# Patient Record
Sex: FEMALE | Race: WHITE | NOT HISPANIC OR LATINO | Employment: FULL TIME | URBAN - METROPOLITAN AREA
[De-identification: names, ages, dates, MRNs, and addresses within clinical notes are randomized per-mention and may not be internally consistent; named-entity substitution may affect disease eponyms.]

---

## 2017-03-23 ENCOUNTER — HOSPITAL ENCOUNTER (OUTPATIENT)
Dept: PHYSICAL THERAPY | Facility: REHABILITATION | Age: 52
End: 2017-03-23
Attending: FAMILY MEDICINE
Payer: COMMERCIAL

## 2017-03-23 PROCEDURE — 97014 ELECTRIC STIMULATION THERAPY: CPT

## 2017-03-23 PROCEDURE — 97161 PT EVAL LOW COMPLEX 20 MIN: CPT

## 2017-03-23 PROCEDURE — 97110 THERAPEUTIC EXERCISES: CPT

## 2017-03-27 ENCOUNTER — HOSPITAL ENCOUNTER (OUTPATIENT)
Dept: PHYSICAL THERAPY | Facility: REHABILITATION | Age: 52
End: 2017-03-27
Attending: FAMILY MEDICINE
Payer: COMMERCIAL

## 2017-03-27 PROCEDURE — 97014 ELECTRIC STIMULATION THERAPY: CPT

## 2017-03-27 PROCEDURE — 97140 MANUAL THERAPY 1/> REGIONS: CPT

## 2017-03-30 ENCOUNTER — HOSPITAL ENCOUNTER (OUTPATIENT)
Dept: PHYSICAL THERAPY | Facility: REHABILITATION | Age: 52
End: 2017-03-30
Attending: FAMILY MEDICINE
Payer: COMMERCIAL

## 2017-03-30 PROCEDURE — 97014 ELECTRIC STIMULATION THERAPY: CPT

## 2017-03-30 PROCEDURE — 97140 MANUAL THERAPY 1/> REGIONS: CPT

## 2017-04-19 ENCOUNTER — HOSPITAL ENCOUNTER (OUTPATIENT)
Dept: PHYSICAL THERAPY | Facility: REHABILITATION | Age: 52
End: 2017-04-19
Attending: FAMILY MEDICINE
Payer: COMMERCIAL

## 2017-04-19 PROCEDURE — 97110 THERAPEUTIC EXERCISES: CPT

## 2017-04-19 PROCEDURE — 97014 ELECTRIC STIMULATION THERAPY: CPT

## 2017-04-19 PROCEDURE — 97140 MANUAL THERAPY 1/> REGIONS: CPT

## 2017-04-21 ENCOUNTER — APPOINTMENT (OUTPATIENT)
Dept: PHYSICAL THERAPY | Facility: REHABILITATION | Age: 52
End: 2017-04-21
Attending: FAMILY MEDICINE
Payer: COMMERCIAL

## 2017-04-24 ENCOUNTER — HOSPITAL ENCOUNTER (OUTPATIENT)
Dept: PHYSICAL THERAPY | Facility: REHABILITATION | Age: 52
End: 2017-04-24
Attending: FAMILY MEDICINE
Payer: COMMERCIAL

## 2017-04-24 PROCEDURE — 97014 ELECTRIC STIMULATION THERAPY: CPT

## 2017-04-24 PROCEDURE — 97140 MANUAL THERAPY 1/> REGIONS: CPT

## 2017-04-27 ENCOUNTER — APPOINTMENT (OUTPATIENT)
Dept: PHYSICAL THERAPY | Facility: REHABILITATION | Age: 52
End: 2017-04-27
Attending: FAMILY MEDICINE
Payer: COMMERCIAL

## 2017-05-01 ENCOUNTER — HOSPITAL ENCOUNTER (OUTPATIENT)
Dept: PHYSICAL THERAPY | Facility: REHABILITATION | Age: 52
End: 2017-05-01
Attending: FAMILY MEDICINE
Payer: COMMERCIAL

## 2017-05-01 PROCEDURE — 97140 MANUAL THERAPY 1/> REGIONS: CPT

## 2017-05-01 PROCEDURE — 97014 ELECTRIC STIMULATION THERAPY: CPT

## 2017-05-04 ENCOUNTER — APPOINTMENT (OUTPATIENT)
Dept: PHYSICAL THERAPY | Facility: REHABILITATION | Age: 52
End: 2017-05-04
Attending: FAMILY MEDICINE
Payer: COMMERCIAL

## 2017-05-09 ENCOUNTER — HOSPITAL ENCOUNTER (OUTPATIENT)
Dept: PHYSICAL THERAPY | Facility: REHABILITATION | Age: 52
End: 2017-05-09
Attending: FAMILY MEDICINE
Payer: COMMERCIAL

## 2017-05-09 PROCEDURE — 97014 ELECTRIC STIMULATION THERAPY: CPT

## 2017-05-09 PROCEDURE — 97140 MANUAL THERAPY 1/> REGIONS: CPT

## 2017-05-18 ENCOUNTER — HOSPITAL ENCOUNTER (OUTPATIENT)
Dept: PHYSICAL THERAPY | Facility: REHABILITATION | Age: 52
End: 2017-05-18
Attending: FAMILY MEDICINE
Payer: COMMERCIAL

## 2017-05-18 PROCEDURE — 97140 MANUAL THERAPY 1/> REGIONS: CPT

## 2017-05-18 PROCEDURE — 97110 THERAPEUTIC EXERCISES: CPT

## 2017-05-18 PROCEDURE — 97014 ELECTRIC STIMULATION THERAPY: CPT

## 2017-05-25 ENCOUNTER — HOSPITAL ENCOUNTER (OUTPATIENT)
Dept: PHYSICAL THERAPY | Facility: REHABILITATION | Age: 52
End: 2017-05-25
Attending: FAMILY MEDICINE
Payer: COMMERCIAL

## 2017-05-25 PROCEDURE — 97140 MANUAL THERAPY 1/> REGIONS: CPT

## 2017-05-25 PROCEDURE — 97110 THERAPEUTIC EXERCISES: CPT

## 2017-05-25 PROCEDURE — 97014 ELECTRIC STIMULATION THERAPY: CPT

## 2017-06-01 ENCOUNTER — HOSPITAL ENCOUNTER (OUTPATIENT)
Dept: PHYSICAL THERAPY | Facility: REHABILITATION | Age: 52
End: 2017-06-01
Attending: FAMILY MEDICINE
Payer: COMMERCIAL

## 2017-06-01 PROCEDURE — 97140 MANUAL THERAPY 1/> REGIONS: CPT

## 2017-06-01 PROCEDURE — 97014 ELECTRIC STIMULATION THERAPY: CPT

## 2017-06-08 ENCOUNTER — APPOINTMENT (OUTPATIENT)
Dept: PHYSICAL THERAPY | Facility: REHABILITATION | Age: 52
End: 2017-06-08
Attending: FAMILY MEDICINE
Payer: COMMERCIAL

## 2017-06-15 ENCOUNTER — APPOINTMENT (OUTPATIENT)
Dept: PHYSICAL THERAPY | Facility: REHABILITATION | Age: 52
End: 2017-06-15
Attending: FAMILY MEDICINE
Payer: COMMERCIAL

## 2017-06-22 ENCOUNTER — HOSPITAL ENCOUNTER (OUTPATIENT)
Dept: PHYSICAL THERAPY | Facility: REHABILITATION | Age: 52
End: 2017-06-22
Attending: FAMILY MEDICINE
Payer: COMMERCIAL

## 2017-06-22 PROCEDURE — 97140 MANUAL THERAPY 1/> REGIONS: CPT

## 2017-06-22 PROCEDURE — 97014 ELECTRIC STIMULATION THERAPY: CPT

## 2017-06-22 PROCEDURE — 97110 THERAPEUTIC EXERCISES: CPT

## 2017-09-06 ENCOUNTER — NON-PROVIDER VISIT (OUTPATIENT)
Dept: URGENT CARE | Facility: PHYSICIAN GROUP | Age: 52
End: 2017-09-06

## 2017-09-06 DIAGNOSIS — Z23 NEED FOR INFLUENZA VACCINATION: ICD-10-CM

## 2017-09-06 PROCEDURE — 90686 IIV4 VACC NO PRSV 0.5 ML IM: CPT | Performed by: PHYSICIAN ASSISTANT

## 2017-11-29 ENCOUNTER — OFFICE VISIT (OUTPATIENT)
Dept: URGENT CARE | Facility: PHYSICIAN GROUP | Age: 52
End: 2017-11-29
Payer: COMMERCIAL

## 2017-11-29 ENCOUNTER — HOSPITAL ENCOUNTER (OUTPATIENT)
Dept: RADIOLOGY | Facility: MEDICAL CENTER | Age: 52
End: 2017-11-29
Attending: PHYSICIAN ASSISTANT
Payer: COMMERCIAL

## 2017-11-29 VITALS
DIASTOLIC BLOOD PRESSURE: 70 MMHG | HEART RATE: 86 BPM | TEMPERATURE: 97.9 F | HEIGHT: 60 IN | BODY MASS INDEX: 38.48 KG/M2 | RESPIRATION RATE: 16 BRPM | SYSTOLIC BLOOD PRESSURE: 94 MMHG | WEIGHT: 196 LBS | OXYGEN SATURATION: 96 %

## 2017-11-29 DIAGNOSIS — R05.9 COUGH: ICD-10-CM

## 2017-11-29 PROCEDURE — 99214 OFFICE O/P EST MOD 30 MIN: CPT | Performed by: PHYSICIAN ASSISTANT

## 2017-11-29 PROCEDURE — 71020 DX-CHEST-2 VIEWS: CPT

## 2017-11-29 RX ORDER — AZITHROMYCIN 250 MG/1
TABLET, FILM COATED ORAL
Qty: 6 TAB | Refills: 0 | Status: SHIPPED | OUTPATIENT
Start: 2017-11-29 | End: 2017-12-02

## 2017-11-29 NOTE — PROGRESS NOTES
"Chief Complaint   Patient presents with   • Cough     cough, ear pain       HISTORY OF PRESENT ILLNESS: Patient is a 52 y.o. female who presents today for about 1 week of worsening cough, chest congestion.  Last night she states there was \"fluid literally sloshing around her left ear\"  This is not as bad today.   Her cough is mostly dry and hacking.  She only feels SOB after a coughing fit.  No chest pain.  Some tightness.  No hx of asthma or smoking.   Did get flu shot.   Is having low grade temps up to 100 in the evenings the last few days.   Not sleeping due to the cough at all which she feels is not helping.     There are no active problems to display for this patient.      Allergies:Codeine    Current Outpatient Prescriptions Ordered in [facility]   Medication Sig Dispense Refill   • Hydrocod Polst-CPM Polst ER (TUSSIONEX PENNKINETIC ER) 10-8 MG/5ML Suspension Extended Release Take 5 mL by mouth at bedtime as needed for Cough or Rhinitis. 100 mL 0   • azithromycin (ZITHROMAX) 250 MG Tab Take as directed 6 Tab 0   • fluticasone (FLONASE) 50 MCG/ACT nasal spray Spray 2 Sprays in nose every day. 1 Bottle 0   • ibuprofen (MOTRIN) 200 MG Tab Take 800 mg by mouth every 6 hours as needed.     • Fexofenadine HCl (ALLEGRA ALLERGY PO) Take  by mouth.     • fluticasone (FLONASE) 50 MCG/ACT nasal spray Spray 2 Sprays in nose every day. 16 g 0   • Pseudoephedrine-Naproxen Na (SUDAFED PRESSURE+PAIN 12 HR) 120-220 MG TB12 Take 1 Tab by mouth 2 Times a Day. 28 Tab 0   • valacyclovir (VALTREX) 500 MG TABS Take 500 mg by mouth every day.       • citalopram (CELEXA) 20 MG TABS Take 20 mg by mouth every day.         No current Epic-ordered facility-administered medications on file.        Past Medical History:   Diagnosis Date   • Anxiety    • Depression        Social History   Substance Use Topics   • Smoking status: Never Smoker   • Smokeless tobacco: Never Used   • Alcohol use Not on file       No family status information on file. "   No family history on file. No pertinent FH    ROS:  Review of Systems   Constitutional: SEE HPI  HENT: SEE HPI   Eyes: Negative for blurred vision.   Respiratory: SEE HPI  Cardiovascular: Negative for chest pain, palpitations, orthopnea and leg swelling.   Gastrointestinal: Negative for heartburn, nausea, vomiting and abdominal pain.   All other systems reviewed and are negative.       Exam:  Blood pressure (!) 94/70, pulse 86, temperature 36.6 °C (97.9 °F), resp. rate 16, height 1.524 m (5'), weight 88.9 kg (196 lb), SpO2 96 %.  General:  Well nourished, well developed female in NAD  Eyes: PERRLA, EOM within normal limits, no conjunctival injection, no scleral icterus, visual fields and acuity grossly intact.  Ears: Normal shape and symmetry, no tenderness, no discharge. External canals are without any significant edema or erythema. Tympanic membranes are without any inflammation, no effusion. Gross auditory acuity is intact  Nose: Symmetrical, sinuses without tenderness, no discharge.   Mouth: reasonable hygiene, no erythema exudates or tonsillar enlargement.  Neck: no masses, range of motion within normal limits, no tracheal deviation. No lymphadenopathy  Pulmonary: Normal respiratory effort, no wheezes, crackles, or rhonchi.  Cardiovascular: regular rate and rhythm without murmurs, rubs, or gallops.  Abdomen: Soft, nontender, nondistended. Normal bowel sounds. No hepatosplenomegaly or masses, or hernias. No rebound or guarding.  Skin: No visible rashes or lesion. Warm, pink, dry.   Extremities: no clubbing, cyanosis, or edema.  Neuro: A&O x 3. Speech normal/clear.  Normal gait.     RAD      1.  Minimal linear parenchymal opacity in the medial lower right hemithorax. This is probably due to scarring or atelectasis although conceivably could be an area of pneumonitis.   Reading Provider Reading Date   Emily Escalona M.D. Nov 29, 2017       Assessment/Plan:  1. Cough  DX-CHEST-2 VIEWS    Hydrocod Polst-CPM Polst  ER (TUSSIONEX PENNKINETIC ER) 10-8 MG/5ML Suspension Extended Release    azithromycin (ZITHROMAX) 250 MG Tab         -RAD as above.   -albuterol neb helped symptoms and wheezing.   -tx coverage as above, patient worsening/tactile fevers at day 7   -cough syrup as above.  Emphasized drowsy and no driving on this medicine.  Patient expressed understanding of this.   -humidifier/steamy showers recommended for lung soothing.  Rest and fluids emphasized.   -strict RTC precautions.     Supportive care, differential diagnoses, and indications for immediate follow-up discussed with patient.   Pathogenesis of diagnosis discussed including typical length and natural progression.   Instructed to return to clinic or nearest emergency department for any change in condition, further concerns, or worsening of symptoms.  Patient states understanding of the plan of care and discharge instructions.        Pretty Torres P.A.-C.

## 2017-12-02 RX ORDER — DOXYCYCLINE HYCLATE 100 MG
100 TABLET ORAL 2 TIMES DAILY
Qty: 20 TAB | Refills: 0 | Status: SHIPPED | OUTPATIENT
Start: 2017-12-02 | End: 2018-09-24

## 2018-03-08 ENCOUNTER — HOSPITAL ENCOUNTER (OUTPATIENT)
Dept: LAB | Facility: MEDICAL CENTER | Age: 53
End: 2018-03-08
Attending: FAMILY MEDICINE
Payer: COMMERCIAL

## 2018-03-08 LAB
25(OH)D3 SERPL-MCNC: 21 NG/ML (ref 30–100)
ALBUMIN SERPL BCP-MCNC: 4.2 G/DL (ref 3.2–4.9)
ALBUMIN/GLOB SERPL: 1.6 G/DL
ALP SERPL-CCNC: 84 U/L (ref 30–99)
ALT SERPL-CCNC: 15 U/L (ref 2–50)
ANION GAP SERPL CALC-SCNC: 6 MMOL/L (ref 0–11.9)
APPEARANCE UR: ABNORMAL
AST SERPL-CCNC: 19 U/L (ref 12–45)
BACTERIA #/AREA URNS HPF: NEGATIVE /HPF
BASOPHILS # BLD AUTO: 1.2 % (ref 0–1.8)
BASOPHILS # BLD: 0.08 K/UL (ref 0–0.12)
BILIRUB SERPL-MCNC: 0.5 MG/DL (ref 0.1–1.5)
BILIRUB UR QL STRIP.AUTO: NEGATIVE
BUN SERPL-MCNC: 14 MG/DL (ref 8–22)
CALCIUM SERPL-MCNC: 9.4 MG/DL (ref 8.5–10.5)
CHLORIDE SERPL-SCNC: 105 MMOL/L (ref 96–112)
CHOLEST SERPL-MCNC: 233 MG/DL (ref 100–199)
CO2 SERPL-SCNC: 28 MMOL/L (ref 20–33)
COLOR UR: YELLOW
CREAT SERPL-MCNC: 0.84 MG/DL (ref 0.5–1.4)
CULTURE IF INDICATED INDCX: NO UA CULTURE
EOSINOPHIL # BLD AUTO: 0.26 K/UL (ref 0–0.51)
EOSINOPHIL NFR BLD: 3.9 % (ref 0–6.9)
EPI CELLS #/AREA URNS HPF: ABNORMAL /HPF
ERYTHROCYTE [DISTWIDTH] IN BLOOD BY AUTOMATED COUNT: 42.3 FL (ref 35.9–50)
EST. AVERAGE GLUCOSE BLD GHB EST-MCNC: 117 MG/DL
GLOBULIN SER CALC-MCNC: 2.7 G/DL (ref 1.9–3.5)
GLUCOSE SERPL-MCNC: 90 MG/DL (ref 65–99)
GLUCOSE UR STRIP.AUTO-MCNC: NEGATIVE MG/DL
HBA1C MFR BLD: 5.7 % (ref 0–5.6)
HCT VFR BLD AUTO: 44.3 % (ref 37–47)
HDLC SERPL-MCNC: 36 MG/DL
HGB BLD-MCNC: 14.5 G/DL (ref 12–16)
HYALINE CASTS #/AREA URNS LPF: ABNORMAL /LPF
IMM GRANULOCYTES # BLD AUTO: 0.02 K/UL (ref 0–0.11)
IMM GRANULOCYTES NFR BLD AUTO: 0.3 % (ref 0–0.9)
KETONES UR STRIP.AUTO-MCNC: NEGATIVE MG/DL
LDLC SERPL CALC-MCNC: 166 MG/DL
LEUKOCYTE ESTERASE UR QL STRIP.AUTO: NEGATIVE
LYMPHOCYTES # BLD AUTO: 2.36 K/UL (ref 1–4.8)
LYMPHOCYTES NFR BLD: 35.2 % (ref 22–41)
MCH RBC QN AUTO: 31.5 PG (ref 27–33)
MCHC RBC AUTO-ENTMCNC: 32.7 G/DL (ref 33.6–35)
MCV RBC AUTO: 96.1 FL (ref 81.4–97.8)
MICRO URNS: ABNORMAL
MONOCYTES # BLD AUTO: 0.48 K/UL (ref 0–0.85)
MONOCYTES NFR BLD AUTO: 7.2 % (ref 0–13.4)
NEUTROPHILS # BLD AUTO: 3.5 K/UL (ref 2–7.15)
NEUTROPHILS NFR BLD: 52.2 % (ref 44–72)
NITRITE UR QL STRIP.AUTO: NEGATIVE
NRBC # BLD AUTO: 0 K/UL
NRBC BLD-RTO: 0 /100 WBC
PH UR STRIP.AUTO: 5.5 [PH]
PLATELET # BLD AUTO: 334 K/UL (ref 164–446)
PMV BLD AUTO: 8.9 FL (ref 9–12.9)
POTASSIUM SERPL-SCNC: 4.4 MMOL/L (ref 3.6–5.5)
PROT SERPL-MCNC: 6.9 G/DL (ref 6–8.2)
PROT UR QL STRIP: NEGATIVE MG/DL
RBC # BLD AUTO: 4.61 M/UL (ref 4.2–5.4)
RBC # URNS HPF: ABNORMAL /HPF
RBC UR QL AUTO: NEGATIVE
SODIUM SERPL-SCNC: 139 MMOL/L (ref 135–145)
SP GR UR STRIP.AUTO: 1.02
TRIGL SERPL-MCNC: 155 MG/DL (ref 0–149)
TSH SERPL DL<=0.005 MIU/L-ACNC: 2.21 UIU/ML (ref 0.38–5.33)
UROBILINOGEN UR STRIP.AUTO-MCNC: 0.2 MG/DL
WBC # BLD AUTO: 6.7 K/UL (ref 4.8–10.8)
WBC #/AREA URNS HPF: ABNORMAL /HPF

## 2018-03-08 PROCEDURE — 82306 VITAMIN D 25 HYDROXY: CPT

## 2018-03-08 PROCEDURE — 36415 COLL VENOUS BLD VENIPUNCTURE: CPT

## 2018-03-08 PROCEDURE — 85025 COMPLETE CBC W/AUTO DIFF WBC: CPT

## 2018-03-08 PROCEDURE — 80061 LIPID PANEL: CPT

## 2018-03-08 PROCEDURE — 80053 COMPREHEN METABOLIC PANEL: CPT

## 2018-03-08 PROCEDURE — 83036 HEMOGLOBIN GLYCOSYLATED A1C: CPT

## 2018-03-08 PROCEDURE — 81001 URINALYSIS AUTO W/SCOPE: CPT

## 2018-03-08 PROCEDURE — 84443 ASSAY THYROID STIM HORMONE: CPT

## 2018-07-26 ENCOUNTER — TELEPHONE (OUTPATIENT)
Dept: RADIOLOGY | Facility: MEDICAL CENTER | Age: 53
End: 2018-07-26

## 2018-07-26 NOTE — TELEPHONE ENCOUNTER
LM TO SCHED SCREENING; ADVISED PT WE ARE HOLDING 8/3/18 4:05 SLOT SO THAT HER & HER MOTHER CAN COME TOGETHER/TML

## 2018-08-03 ENCOUNTER — HOSPITAL ENCOUNTER (OUTPATIENT)
Dept: RADIOLOGY | Facility: MEDICAL CENTER | Age: 53
End: 2018-08-03
Attending: FAMILY MEDICINE
Payer: COMMERCIAL

## 2018-08-03 DIAGNOSIS — Z12.31 VISIT FOR SCREENING MAMMOGRAM: ICD-10-CM

## 2018-08-03 PROCEDURE — 77067 SCR MAMMO BI INCL CAD: CPT

## 2018-09-09 ENCOUNTER — OFFICE VISIT (OUTPATIENT)
Dept: URGENT CARE | Facility: PHYSICIAN GROUP | Age: 53
End: 2018-09-09
Payer: COMMERCIAL

## 2018-09-09 ENCOUNTER — HOSPITAL ENCOUNTER (OUTPATIENT)
Dept: RADIOLOGY | Facility: MEDICAL CENTER | Age: 53
End: 2018-09-09
Attending: PHYSICIAN ASSISTANT
Payer: COMMERCIAL

## 2018-09-09 VITALS
HEIGHT: 60 IN | TEMPERATURE: 97.8 F | OXYGEN SATURATION: 96 % | SYSTOLIC BLOOD PRESSURE: 118 MMHG | DIASTOLIC BLOOD PRESSURE: 78 MMHG | BODY MASS INDEX: 38.09 KG/M2 | WEIGHT: 194 LBS | HEART RATE: 72 BPM | RESPIRATION RATE: 16 BRPM

## 2018-09-09 DIAGNOSIS — M25.561 ACUTE PAIN OF RIGHT KNEE: ICD-10-CM

## 2018-09-09 DIAGNOSIS — M25.461 EFFUSION OF RIGHT KNEE: ICD-10-CM

## 2018-09-09 PROCEDURE — 73564 X-RAY EXAM KNEE 4 OR MORE: CPT | Mod: RT

## 2018-09-09 PROCEDURE — 99214 OFFICE O/P EST MOD 30 MIN: CPT | Performed by: PHYSICIAN ASSISTANT

## 2018-09-09 RX ORDER — METHYLPREDNISOLONE 4 MG/1
TABLET ORAL
Qty: 1 KIT | Refills: 0 | Status: SHIPPED | OUTPATIENT
Start: 2018-09-09 | End: 2019-02-07

## 2018-09-09 RX ORDER — TRAMADOL HYDROCHLORIDE 50 MG/1
50 TABLET ORAL EVERY 6 HOURS PRN
Qty: 20 TAB | Refills: 0 | Status: SHIPPED | OUTPATIENT
Start: 2018-09-09 | End: 2018-09-16

## 2018-09-09 ASSESSMENT — ENCOUNTER SYMPTOMS
NAUSEA: 0
FEVER: 0
VOMITING: 0
CHILLS: 0
SHORTNESS OF BREATH: 0

## 2018-09-09 NOTE — PROGRESS NOTES
Subjective:   Toya Shields is a 53 y.o. female who presents for Knee Pain (R knee pain x3 wks)        Knee Pain   This is a recurrent problem. The current episode started 1 to 4 weeks ago. Pertinent negatives include no chills, fever, nausea, rash or vomiting.     Patient has noted approximately 3 weeks of pain in the right knee worsening over the last 2-3 days.  She states initially had been a full achy pain to posterior knee as well as some pain over proximal calf.  Over the last 48 hours without memorable trauma or injury she has noted a dramatic increase in swelling and medial sided pain.  She denies past medical history of memorable traumatic injury.  She denies past medical history of surgery to her right knee.  She is tried over-the-counter anti-inflammatories for pain with very mild relief.  She notes last night to today significant increasing sharp pain in the medial joint line causing pain with ambulation and limited weightbearing.  She complains of mechanical symptoms of catching locking as well as sensation of giving way or instability.  She notes past medical history of contralateral meniscus tear treated nonoperatively, denies past medical history of other knee surgeries significant knee injuries.    Review of Systems   Constitutional: Negative for chills and fever.   Respiratory: Negative for shortness of breath.    Gastrointestinal: Negative for nausea and vomiting.   Musculoskeletal: Positive for joint pain ( pos for right knee pain).   Skin: Negative for rash.     Allergies   Allergen Reactions   • Codeine Vomiting      Objective:   /78   Pulse 72   Temp 36.6 °C (97.8 °F)   Resp 16   Ht 1.524 m (5')   Wt 88 kg (194 lb)   SpO2 96%   BMI 37.89 kg/m²   Physical Exam   Constitutional: She is oriented to person, place, and time. She appears well-developed and well-nourished. No distress.   HENT:   Head: Normocephalic and atraumatic.   Right Ear: External ear normal.   Left Ear:  External ear normal.   Nose: Nose normal.   Eyes: Conjunctivae and lids are normal. Right eye exhibits no discharge. Left eye exhibits no discharge. No scleral icterus.   Neck: Neck supple.   Pulmonary/Chest: Effort normal. No respiratory distress.   Musculoskeletal: Normal range of motion.        Right knee: She exhibits normal range of motion, no swelling, no effusion, no ecchymosis, no deformity, no laceration, no erythema, normal alignment and no LCL laxity.   Right knee with moderate effusion no erythema or ecchymosis limited active range of motion of pain with forced flexion and pain with forced extension negative Lachman's stable with varus and valgus stress firm endpoint appreciated positive Kim's medial joint line tenderness   Neurological: She is alert and oriented to person, place, and time. She is not disoriented.   Skin: Skin is warm and dry. She is not diaphoretic. No erythema. No pallor.   Psychiatric: Her speech is normal and behavior is normal.   Nursing note and vitals reviewed.  Impression       Small knee effusion. Minimal tricompartmental arthrosis. No acute osseous abnormality.   Reading Provider Reading Date   Kellie Nolan M.D. Sep 9, 2018   Signing Provider Signing Date Signing Time   Kellie Nolan M.D. Sep 9, 2018  1:27 PM         Assessment/Plan:   Assessment    1. Acute pain of right knee  - DX-KNEE COMPLETE 4+ RIGHT; Future  - REFERRAL TO ORTHOPEDICS  - MethylPREDNISolone (MEDROL DOSEPAK) 4 MG Tablet Therapy Pack; Take as directed on package.  Dispense one package.  Dispense: 1 Kit; Refill: 0  - tramadol (ULTRAM) 50 MG Tab; Take 1 Tab by mouth every 6 hours as needed for Moderate Pain for up to 7 days.  Dispense: 20 Tab; Refill: 0  - CONSENT FOR OPIATE PRESCRIPTION    2. Effusion of right knee  - MethylPREDNISolone (MEDROL DOSEPAK) 4 MG Tablet Therapy Pack; Take as directed on package.  Dispense one package.  Dispense: 1 Kit; Refill: 0  - CONSENT FOR OPIATE  PRESCRIPTION    Recommend conservative care, rest, ice, elevation, work on gentle ROM exercises  Return to clinic with lack of resolution or progression of symptoms.  Wall slides taught, medrol now, tramadol for breakthrough pain, crutch ambulation PRN antalgic gait, f/u w/ ortho    Cautioned regarding potential side effects of steroid, avoid nsaids while using  Cautioned regarding potential for sedation with medication.    Differential diagnosis, natural history, supportive care, and indications for immediate follow-up discussed.

## 2018-09-09 NOTE — LETTER
September 9, 2018       Patient: Toya Shields   YOB: 1965   Date of Visit: 9/9/2018         To Whom It May Concern:    It is my medical opinion that Toya Shields should be excused from work for Monday, Tuesday and Wednesday of this week due to injury.      If you have any questions or concerns, please don't hesitate to call [phone]          Sincerely,          Juan Carlos Ward P.A.-C.  Electronically Signed

## 2018-09-12 ENCOUNTER — HOSPITAL ENCOUNTER (OUTPATIENT)
Dept: RADIOLOGY | Facility: MEDICAL CENTER | Age: 53
End: 2018-09-12
Attending: ORTHOPAEDIC SURGERY
Payer: COMMERCIAL

## 2018-09-12 DIAGNOSIS — S83.231A COMPLEX TEAR OF MEDIAL MENISCUS OF RIGHT KNEE AS CURRENT INJURY, INITIAL ENCOUNTER: ICD-10-CM

## 2018-09-12 PROCEDURE — 73721 MRI JNT OF LWR EXTRE W/O DYE: CPT | Mod: RT

## 2018-09-24 ENCOUNTER — APPOINTMENT (OUTPATIENT)
Dept: ADMISSIONS | Facility: MEDICAL CENTER | Age: 53
End: 2018-09-24
Attending: ORTHOPAEDIC SURGERY
Payer: COMMERCIAL

## 2018-09-24 RX ORDER — CELECOXIB 200 MG/1
200 CAPSULE ORAL PRN
COMMUNITY
End: 2019-02-07

## 2018-09-24 RX ORDER — TRAMADOL HYDROCHLORIDE 50 MG/1
50 TABLET ORAL EVERY 4 HOURS PRN
COMMUNITY
End: 2019-02-07

## 2018-09-24 NOTE — OR NURSING
"Preadmit appointment: \" Preparing for your Procedure information\" sheet given to patient with verbal and written instructions. Patient instructed to continue prescribed medications through the day before surgery, instructed to take the following medications the day of surgery per anesthesia protocol: Tramadol if needed  "

## 2018-09-28 ENCOUNTER — HOSPITAL ENCOUNTER (OUTPATIENT)
Facility: MEDICAL CENTER | Age: 53
End: 2018-09-28
Attending: ORTHOPAEDIC SURGERY | Admitting: ORTHOPAEDIC SURGERY
Payer: COMMERCIAL

## 2018-09-28 VITALS
DIASTOLIC BLOOD PRESSURE: 73 MMHG | OXYGEN SATURATION: 94 % | RESPIRATION RATE: 16 BRPM | TEMPERATURE: 100 F | SYSTOLIC BLOOD PRESSURE: 133 MMHG | HEIGHT: 60 IN | WEIGHT: 197.53 LBS | BODY MASS INDEX: 38.78 KG/M2

## 2018-09-28 DIAGNOSIS — S83.231A COMPLEX TEAR OF MEDIAL MENISCUS OF RIGHT KNEE, INITIAL ENCOUNTER: ICD-10-CM

## 2018-09-28 PROCEDURE — 160048 HCHG OR STATISTICAL LEVEL 1-5: Performed by: ORTHOPAEDIC SURGERY

## 2018-09-28 PROCEDURE — 160041 HCHG SURGERY MINUTES - EA ADDL 1 MIN LEVEL 4: Performed by: ORTHOPAEDIC SURGERY

## 2018-09-28 PROCEDURE — 160035 HCHG PACU - 1ST 60 MINS PHASE I: Performed by: ORTHOPAEDIC SURGERY

## 2018-09-28 PROCEDURE — 501838 HCHG SUTURE GENERAL: Performed by: ORTHOPAEDIC SURGERY

## 2018-09-28 PROCEDURE — 160002 HCHG RECOVERY MINUTES (STAT): Performed by: ORTHOPAEDIC SURGERY

## 2018-09-28 PROCEDURE — 160025 RECOVERY II MINUTES (STATS): Performed by: ORTHOPAEDIC SURGERY

## 2018-09-28 PROCEDURE — 700111 HCHG RX REV CODE 636 W/ 250 OVERRIDE (IP)

## 2018-09-28 PROCEDURE — 160029 HCHG SURGERY MINUTES - 1ST 30 MINS LEVEL 4: Performed by: ORTHOPAEDIC SURGERY

## 2018-09-28 PROCEDURE — 700111 HCHG RX REV CODE 636 W/ 250 OVERRIDE (IP): Performed by: ANESTHESIOLOGY

## 2018-09-28 PROCEDURE — 160046 HCHG PACU - 1ST 60 MINS PHASE II: Performed by: ORTHOPAEDIC SURGERY

## 2018-09-28 PROCEDURE — 700102 HCHG RX REV CODE 250 W/ 637 OVERRIDE(OP): Performed by: ANESTHESIOLOGY

## 2018-09-28 PROCEDURE — A9270 NON-COVERED ITEM OR SERVICE: HCPCS | Performed by: ANESTHESIOLOGY

## 2018-09-28 PROCEDURE — 160036 HCHG PACU - EA ADDL 30 MINS PHASE I: Performed by: ORTHOPAEDIC SURGERY

## 2018-09-28 PROCEDURE — 700101 HCHG RX REV CODE 250

## 2018-09-28 PROCEDURE — 160009 HCHG ANES TIME/MIN: Performed by: ORTHOPAEDIC SURGERY

## 2018-09-28 RX ORDER — HYDROMORPHONE HYDROCHLORIDE 2 MG/ML
0.4 INJECTION, SOLUTION INTRAMUSCULAR; INTRAVENOUS; SUBCUTANEOUS
Status: DISCONTINUED | OUTPATIENT
Start: 2018-09-28 | End: 2018-09-28 | Stop reason: HOSPADM

## 2018-09-28 RX ORDER — SODIUM CHLORIDE, SODIUM LACTATE, POTASSIUM CHLORIDE, CALCIUM CHLORIDE 600; 310; 30; 20 MG/100ML; MG/100ML; MG/100ML; MG/100ML
INJECTION, SOLUTION INTRAVENOUS
Status: DISCONTINUED | OUTPATIENT
Start: 2018-09-28 | End: 2018-09-28 | Stop reason: HOSPADM

## 2018-09-28 RX ORDER — ONDANSETRON 2 MG/ML
4 INJECTION INTRAMUSCULAR; INTRAVENOUS
Status: DISCONTINUED | OUTPATIENT
Start: 2018-09-28 | End: 2018-09-28 | Stop reason: HOSPADM

## 2018-09-28 RX ORDER — HYDROMORPHONE HYDROCHLORIDE 2 MG/ML
0.1 INJECTION, SOLUTION INTRAMUSCULAR; INTRAVENOUS; SUBCUTANEOUS
Status: DISCONTINUED | OUTPATIENT
Start: 2018-09-28 | End: 2018-09-28 | Stop reason: HOSPADM

## 2018-09-28 RX ORDER — ROPIVACAINE HYDROCHLORIDE 5 MG/ML
INJECTION, SOLUTION EPIDURAL; INFILTRATION; PERINEURAL
Status: DISCONTINUED | OUTPATIENT
Start: 2018-09-28 | End: 2018-09-28 | Stop reason: HOSPADM

## 2018-09-28 RX ORDER — OXYCODONE HCL 5 MG/5 ML
5 SOLUTION, ORAL ORAL
Status: COMPLETED | OUTPATIENT
Start: 2018-09-28 | End: 2018-09-28

## 2018-09-28 RX ORDER — HYDROMORPHONE HYDROCHLORIDE 2 MG/ML
0.2 INJECTION, SOLUTION INTRAMUSCULAR; INTRAVENOUS; SUBCUTANEOUS
Status: DISCONTINUED | OUTPATIENT
Start: 2018-09-28 | End: 2018-09-28 | Stop reason: HOSPADM

## 2018-09-28 RX ORDER — ONDANSETRON 4 MG/1
4 TABLET, FILM COATED ORAL EVERY 4 HOURS PRN
Qty: 8 TAB | Refills: 0 | Status: SHIPPED | OUTPATIENT
Start: 2018-09-28 | End: 2018-10-03

## 2018-09-28 RX ORDER — DIPHENHYDRAMINE HYDROCHLORIDE 50 MG/ML
12.5 INJECTION INTRAMUSCULAR; INTRAVENOUS
Status: DISCONTINUED | OUTPATIENT
Start: 2018-09-28 | End: 2018-09-28 | Stop reason: HOSPADM

## 2018-09-28 RX ORDER — HALOPERIDOL 5 MG/ML
1 INJECTION INTRAMUSCULAR
Status: DISCONTINUED | OUTPATIENT
Start: 2018-09-28 | End: 2018-09-28 | Stop reason: HOSPADM

## 2018-09-28 RX ORDER — OXYCODONE HYDROCHLORIDE 5 MG/1
5 TABLET ORAL
Status: DISCONTINUED | OUTPATIENT
Start: 2018-09-28 | End: 2018-09-28 | Stop reason: HOSPADM

## 2018-09-28 RX ORDER — SODIUM CHLORIDE, SODIUM LACTATE, POTASSIUM CHLORIDE, CALCIUM CHLORIDE 600; 310; 30; 20 MG/100ML; MG/100ML; MG/100ML; MG/100ML
INJECTION, SOLUTION INTRAVENOUS CONTINUOUS
Status: DISCONTINUED | OUTPATIENT
Start: 2018-09-28 | End: 2018-09-28 | Stop reason: HOSPADM

## 2018-09-28 RX ORDER — OXYCODONE HCL 5 MG/5 ML
10 SOLUTION, ORAL ORAL
Status: COMPLETED | OUTPATIENT
Start: 2018-09-28 | End: 2018-09-28

## 2018-09-28 RX ORDER — HYDROCODONE BITARTRATE AND ACETAMINOPHEN 5; 325 MG/1; MG/1
1-2 TABLET ORAL EVERY 6 HOURS PRN
Qty: 15 TAB | Refills: 0 | Status: SHIPPED | OUTPATIENT
Start: 2018-09-28 | End: 2018-10-03

## 2018-09-28 RX ORDER — OXYCODONE HYDROCHLORIDE 10 MG/1
10 TABLET ORAL
Status: DISCONTINUED | OUTPATIENT
Start: 2018-09-28 | End: 2018-09-28 | Stop reason: HOSPADM

## 2018-09-28 RX ORDER — LIDOCAINE HYDROCHLORIDE 10 MG/ML
INJECTION, SOLUTION EPIDURAL; INFILTRATION; INTRACAUDAL; PERINEURAL
Status: COMPLETED
Start: 2018-09-28 | End: 2018-09-28

## 2018-09-28 RX ORDER — MEPERIDINE HYDROCHLORIDE 25 MG/ML
6.25 INJECTION INTRAMUSCULAR; INTRAVENOUS; SUBCUTANEOUS
Status: DISCONTINUED | OUTPATIENT
Start: 2018-09-28 | End: 2018-09-28 | Stop reason: HOSPADM

## 2018-09-28 RX ADMIN — Medication 0.5 ML: at 08:59

## 2018-09-28 RX ADMIN — FENTANYL CITRATE 25 MCG: 50 INJECTION, SOLUTION INTRAMUSCULAR; INTRAVENOUS at 11:57

## 2018-09-28 RX ADMIN — OXYCODONE HYDROCHLORIDE 5 MG: 5 SOLUTION ORAL at 11:57

## 2018-09-28 RX ADMIN — SODIUM CHLORIDE, SODIUM LACTATE, POTASSIUM CHLORIDE, CALCIUM CHLORIDE 1000 ML: 600; 310; 30; 20 INJECTION, SOLUTION INTRAVENOUS at 09:12

## 2018-09-28 RX ADMIN — LIDOCAINE HYDROCHLORIDE 0.5 ML: 10 INJECTION, SOLUTION EPIDURAL; INFILTRATION; INTRACAUDAL; PERINEURAL at 08:59

## 2018-09-28 RX ADMIN — FENTANYL CITRATE 25 MCG: 50 INJECTION, SOLUTION INTRAMUSCULAR; INTRAVENOUS at 12:20

## 2018-09-28 ASSESSMENT — PAIN SCALES - GENERAL
PAINLEVEL_OUTOF10: 0
PAINLEVEL_OUTOF10: 4
PAINLEVEL_OUTOF10: 5
PAINLEVEL_OUTOF10: 0
PAINLEVEL_OUTOF10: ASSUMED PAIN PRESENT
PAINLEVEL_OUTOF10: 4
PAINLEVEL_OUTOF10: 4
PAINLEVEL_OUTOF10: 0
PAINLEVEL_OUTOF10: 4
PAINLEVEL_OUTOF10: 6

## 2018-09-28 NOTE — OR NURSING
1305 arrived to stage 2 alert x 3, able to wiggle toes and cap refill both feet q 3 sec return, states pain very tolerable and denies any nausea. Continues to work with her I/S, family brought in.

## 2018-09-28 NOTE — OR NURSING
1105 Pt to PACU from OR via gurney, respirations spontaneous and non-labored via OPA. Right knee surgical sites clean, dry and intact, pt not arousable on calling. Ice pack placed on right knee surgical sight, 2+ right pedal pulse noted and cap refill < 2 seconds to right toes. Right knee elevated for comfort.   1111 OPA D/C'd   1120 Pt calm resting with no c/o pain or nausea, VSS.   1135 No changes, VSS.   1150 Educated pt on incentive spirometry, patient able to pull between 1500 mL-1650mL with a goal of 1750mL.   1157 Pt report aching pain, oral and IV analgesia administered.   1202 Report to L[address].   1240 Received report from uRdy SHERMAN, assumed care of pt. Pt arousable on calling, denies nausea and reports tolerable pain with non-labored and spontaneous respirations via room air, VSS.   1250 Trialing pt on room air, VSS.   1300 Pt able to maintain O2 sats pt meets criteria for   1305 Pt transported to stage II.

## 2018-09-28 NOTE — DISCHARGE INSTRUCTIONS
ACTIVITY: Rest and take it easy for the first 24 hours.  A responsible adult is recommended to remain with you during that time.  It is normal to feel sleepy.  We encourage you to not do anything that requires balance, judgment or coordination.    MILD FLU-LIKE SYMPTOMS ARE NORMAL. YOU MAY EXPERIENCE GENERALIZED MUSCLE ACHES, THROAT IRRITATION, HEADACHE AND/OR SOME NAUSEA.    FOR 24 HOURS DO NOT:  Drive, operate machinery or run household appliances.  Drink beer or alcoholic beverages.   Make important decisions or sign legal documents.    SPECIAL INSTRUCTIONS: Weight bearing as tolerated, May remove dressings Post op Day #2 (Sunday) and Shower with wound uncovered.  Apply bandaids after shower.  Do not soak or submerge incisions for two weeks. Ice and elevate extremity. Follow up 7-10 days.    DIET: To avoid nausea, slowly advance diet as tolerated, avoiding spicy or greasy foods for the first day.  Add more substantial food to your diet according to your physician's instructions. INCREASE FLUIDS AND FIBER TO AVOID CONSTIPATION.    FOLLOW-UP APPOINTMENT:  A follow-up appointment should be arranged with your doctor in 7-10 days; call to schedule.    You should CALL YOUR PHYSICIAN if you develop:  Fever greater than 101 degrees F.  Pain not relieved by medication, or persistent nausea or vomiting.  Excessive bleeding (blood soaking through dressing) or unexpected drainage from the wound.  Extreme redness or swelling around the incision site, drainage of pus or foul smelling drainage.  Inability to urinate or empty your bladder within 8 hours.  Problems with breathing or chest pain.    You should call 911 if you develop problems with breathing or chest pain.  If you are unable to contact your doctor or surgical center, you should go to the nearest emergency room or urgent care center. Dr. Noonan's telephone #: [phone].     If any questions arise, call your doctor.  If your doctor is not available, please feel  free to call the Surgical Center at [phone].  The Center is open Monday through Friday from 7AM to 7PM.  You can also call the HEALTH HOTLINE open 24 hours/day, 7 days/week and speak to a nurse at [phone], or toll free at [phone].    A registered nurse may call you a few days after your surgery to see how you are doing after your procedure.    MEDICATIONS: Resume taking daily medication.  Take prescribed pain medication with food.  If no medication is prescribed, you may take non-aspirin pain medication if needed.  PAIN MEDICATION CAN BE VERY CONSTIPATING.  Take a stool softener or laxative such as senokot, pericolace, or milk of magnesia if needed.    Prescription given for Norco and Zofran.  Last pain medication given at 11:57pm 5mg Oxycodone.    If your physician has prescribed pain medication that includes Acetaminophen (Tylenol), do not take additional Acetaminophen (Tylenol) while taking the prescribed medication.    Depression / Suicide Risk    As you are discharged from this [facility] facility, it is important to learn how to keep safe from harming yourself.    Recognize the warning signs:  · Abrupt changes in personality, positive or negative- including increase in energy   · Giving away possessions  · Change in eating patterns- significant weight changes-  positive or negative  · Change in sleeping patterns- unable to sleep or sleeping all the time   · Unwillingness or inability to communicate  · Depression  · Unusual sadness, discouragement and loneliness  · Talk of wanting to die  · Neglect of personal appearance   · Rebelliousness- reckless behavior  · Withdrawal from people/activities they love  · Confusion- inability to concentrate     If you or a loved one observes any of these behaviors or has concerns about self-harm, here's what you can do:  · Talk about it- your feelings and reasons for harming yourself  · Remove any means that you might use to hurt yourself (examples:  pills, rope, extension cords, firearm)  · Get professional help from the community (Mental Health, Substance Abuse, psychological counseling)  · Do not be alone:Call your Safe Contact- someone whom you trust who will be there for you.  · Call your local CRISIS HOTLINE 572-0258 or 634-483-9261  · Call your local Children's Mobile Crisis Response Team [place] (539) 386-7378 or www.Digitiliti  · Call the toll free National Suicide Prevention Hotlines   · National Suicide Prevention Lifeline 568-221-JAHP (0138)  · National Hope Line Network 800-SUICIDE (555-6278)

## 2018-09-28 NOTE — OR NURSING
1205- Report received from off going RN. Patient resting in bed. No distress noted at this time.     1220- Patient medicated for pain as charted in medication administration record. Patient reports of throbbing to right knee.    1230- Patient states that pain/throbbing is much better now. Patient asleep in bed.     1240- Patient resting. Report given to returning nurse. Patient states pain is ok at this time.

## 2018-09-28 NOTE — OP REPORT
DATE OF SERVICE:  09/28/2018    SURGEON:  Aubrey Noonan MD    ASSISTANT:  Jose Alberto Gtz PA-C    PREOPERATIVE DIAGNOSES:  1.  Right knee medial meniscus tear.  2.  Right knee chondromalacia, medial femoral condyle.    POSTOPERATIVE DIAGNOSES:  1.  Right knee medial meniscus tear.  2.  Right knee chondromalacia, medial femoral condyle.  3.  Right knee synovitis.    PROCEDURES PERFORMED:  1.  Right knee arthroscopy with partial medial meniscectomy and chondroplasty.  2.  Right knee arthroscopy with limited synovectomy.    ANESTHESIOLOGIST:  Ramos Melo MD    ANESTHETIC:  LMA anesthesia along with local injection.    INDICATIONS:  The patient has had right knee pain for quite some time.  She   has failed nonoperative treatment, elected to proceed with operative   intervention.  She was explained the risks, benefits, alternatives to the   procedure and recovery in detail.  All questions were answered, informed   consent was obtained.  Site verification per protocol was in the preop holding   area and the operating room.  The patient received appropriate preoperative   antibiotics.    DESCRIPTION OF OPERATION:  After successful anesthesia, the patient's right   leg was examined.  She had good range of motion, no evidence of instability.    The leg was then prepped and draped in the usual sterile fashion.    Anterolateral portal was established with knife and blunt trocar in the   suprapatellar pouch.  The suprapatellar pouch, medial and lateral gutters were   free of abnormalities.  The patella had some grade II chondromalacia   diffusely.  This was gently smoothed out with a shaver from multiple portals.    The trochlea was overall in pretty good condition.  She had a pretty   significant inflamed and thickened anterior synovium and synovectomy was done   with a shaver.  This was done through multiple portals as was the   chondroplasty of the patella.  The medial joint revealed a root tear, but the   posterior  aspect was still intact.  The mid body and most of the posterior   horn was intact.  A gentle meniscectomy was done of the root trying to   preserve as much fibers as possible.  This was bevelled and shaved and probed   and then felt it was still stable.  This was done from multiple portals with   saul and baskets.  The medial femoral condyle had a 20x25 area of grade II   chondromalacia.  This was smoothed out with saul and baskets to a stable   margin and then probed.  Tibial plateau was overall in pretty good condition.    The notch revealed normal ACL and PCL.  Lateral joint had normal articular   cartilage and meniscus to visual inspection and probing.  The popliteus was   also normal.  At that point, I lavaged out the joint, suctioned out the fluid,   closed the portals with 3-0 Prolene in interrupted fashion.  Xeroform, 4x4's,   and an Ace wrap was applied.    ESTIMATED BLOOD LOSS:  Minimal.    COMPLICATIONS:  None.    Jose Alberto Gtz PA-C, was medically necessary for the case.  He helped with   instrumentation, retraction, and positioning.  Without his help, the case   would not have been as technically successful.       ____________________________________     MD STEVE ARGUELLES / AJIT    DD:  09/28/2018 11:13:36  DT:  09/28/2018 11:54:18    D#:  [id number]  Job#:  [id number]

## 2019-02-07 ENCOUNTER — OFFICE VISIT (OUTPATIENT)
Dept: MEDICAL GROUP | Facility: PHYSICIAN GROUP | Age: 54
End: 2019-02-07
Payer: COMMERCIAL

## 2019-02-07 VITALS
SYSTOLIC BLOOD PRESSURE: 112 MMHG | OXYGEN SATURATION: 95 % | HEART RATE: 86 BPM | DIASTOLIC BLOOD PRESSURE: 78 MMHG | RESPIRATION RATE: 16 BRPM | TEMPERATURE: 97.3 F | HEIGHT: 60 IN | BODY MASS INDEX: 37.89 KG/M2 | WEIGHT: 193 LBS

## 2019-02-07 DIAGNOSIS — R79.89 LOW VITAMIN D LEVEL: ICD-10-CM

## 2019-02-07 DIAGNOSIS — Z76.89 ENCOUNTER TO ESTABLISH CARE WITH NEW DOCTOR: ICD-10-CM

## 2019-02-07 DIAGNOSIS — F41.9 ANXIETY AND DEPRESSION: ICD-10-CM

## 2019-02-07 DIAGNOSIS — E78.5 HYPERLIPIDEMIA, UNSPECIFIED HYPERLIPIDEMIA TYPE: ICD-10-CM

## 2019-02-07 DIAGNOSIS — Z00.00 WELLNESS EXAMINATION: ICD-10-CM

## 2019-02-07 DIAGNOSIS — R73.09 ELEVATED GLUCOSE: ICD-10-CM

## 2019-02-07 DIAGNOSIS — B00.9 HSV INFECTION: ICD-10-CM

## 2019-02-07 DIAGNOSIS — F32.A ANXIETY AND DEPRESSION: ICD-10-CM

## 2019-02-07 PROCEDURE — 99214 OFFICE O/P EST MOD 30 MIN: CPT | Performed by: NURSE PRACTITIONER

## 2019-02-07 RX ORDER — VALACYCLOVIR HYDROCHLORIDE 1 G/1
500 TABLET, FILM COATED ORAL 2 TIMES DAILY PRN
Qty: 60 TAB | Refills: 3 | Status: SHIPPED | OUTPATIENT
Start: 2019-02-07 | End: 2021-01-11 | Stop reason: SDUPTHER

## 2019-02-07 RX ORDER — ESCITALOPRAM OXALATE 20 MG/1
20 TABLET ORAL DAILY
Qty: 90 TAB | Refills: 3 | Status: SHIPPED | OUTPATIENT
Start: 2019-02-07 | End: 2019-06-06

## 2019-02-07 RX ORDER — MULTIVIT WITH MINERALS/LUTEIN
1 TABLET ORAL DAILY
COMMUNITY
End: 2022-02-09

## 2019-02-07 ASSESSMENT — PATIENT HEALTH QUESTIONNAIRE - PHQ9
CLINICAL INTERPRETATION OF PHQ2 SCORE: 1
5. POOR APPETITE OR OVEREATING: 0 - NOT AT ALL
SUM OF ALL RESPONSES TO PHQ QUESTIONS 1-9: 4

## 2019-02-07 NOTE — PROGRESS NOTES
Chief Complaint   Patient presents with   • Establish Care   • Depression     Medication managment        HISTORY OF PRESENT ILLNESS: Patient is a 53 y.o. female new patient who presents today to discuss the following issues:    Encounter to establish care with new doctor  Is here to establish with a new primary care provider.      HSV infection  Would like refills of Valtrex.    Anxiety and depression  Has been on citalopram for years, but doesn't feel like it is working anymore.  Discussed issues, options, risks, benefits, and alternatives at length.  She would like to proceed with a trial of Lexapro.      Patient Active Problem List    Diagnosis Date Noted   • HSV infection 02/11/2019   • Anxiety and depression 02/11/2019   • Encounter to establish care with new doctor 02/07/2019   • Complex tear of medial meniscus of right knee, initial encounter 09/28/2018       Allergies:Codeine    Current Outpatient Prescriptions   Medication Sig Dispense Refill   • vitamin E (VITAMIN E) 1000 UNIT Cap Take 1 Cap by mouth every day.     • escitalopram (LEXAPRO) 20 MG tablet Take 1 Tab by mouth every day. 90 Tab 3   • valACYclovir (VALTREX) 1 GM Tab Take 0.5 Tabs by mouth 2 times a day as needed. 60 Tab 3     No current facility-administered medications for this visit.        Social History   Substance Use Topics   • Smoking status: Never Smoker   • Smokeless tobacco: Never Used   • Alcohol use Yes      Comment: 3 per month       No family status information on file.     Family History   Problem Relation Age of Onset   • Diabetes Sister    • Clotting Disorder Sister         factor V       Review of Systems:   Constitutional: Negative for fever, chills, weight loss and malaise/fatigue.   HENT: Negative for ear pain, nosebleeds, congestion, sore throat and neck pain.    Eyes: Negative for blurred vision.   Respiratory: Negative for cough, sputum production, shortness of breath and wheezing.    Cardiovascular: Negative for chest  pain, palpitations, orthopnea and leg swelling.   Gastrointestinal: Negative for heartburn, nausea, vomiting and abdominal pain.   Genitourinary: Negative for dysuria, urgency and frequency.   Musculoskeletal: Negative for myalgias, joint pain, and back pain.  Skin: Negative for rash and itching.   Neurological: Negative for dizziness, tingling, tremors, sensory change, focal weakness and headaches.   Endo/Heme/Allergies: Does not bruise/bleed easily.   Psychiatric/Behavioral: Positive for depression and anxiety.  Denies SI/HI.  All other systems reviewed and are negative except as in HPI.    Exam:  Blood pressure 112/78, pulse 86, temperature 36.3 °C (97.3 °F), resp. rate 16, height 1.524 m (5'), weight 87.5 kg (193 lb), SpO2 95 %.  General:  Well nourished, well developed female in NAD  Head: Grossly normal.  Neck: Supple without JVD or bruit. Thyroid is not enlarged.  Pulmonary: Clear to ausculation. Normal effort. No rales, ronchi, or wheezing.  Cardiovascular: Regular rate and rhythm without murmur.   Abdomen:  Bowel sounds + x 4. Soft, non-tender, nondistended.  Extremities: No clubbing, cyanosis, or edema.  Skin: Intact with no obvious rashes or lesions.  Neuro: Grossly intact.  Psych: Alert and oriented x 3.  Mood and affect appropriate.    Medical decision-making and discussion: Toya is here to establish with a new primary care provider.  We reviewed her past medical history and discussed her current medications.  Lab work was ordered, her valtrex was refilled, and Lexapro was sent to her pharmacy. She will sign a records release for her previous provider, she will [facility], and she will plan to follow-up here as needed.         Assessment/Plan:  1. Encounter to establish care with new doctor     2. BMI 37.0-37.9, adult  Patient identified as having weight management issue.  Appropriate orders and counseling given.   3. Anxiety and depression  escitalopram (LEXAPRO) 20 MG tablet   4. HSV  infection  valACYclovir (VALTREX) 1 GM Tab   5. Low vitamin D level  VITAMIN D,25 HYDROXY   6. Wellness examination  CBC WITH DIFFERENTIAL    HEMOGLOBIN A1C    Comp Metabolic Panel    Lipid Profile    TSH    VITAMIN D,25 HYDROXY   7. Elevated glucose  HEMOGLOBIN A1C   8. Hyperlipidemia, unspecified hyperlipidemia type  Lipid Profile       Return if symptoms worsen or fail to improve.    Please note that this dictation was created using voice recognition software. I have made every reasonable attempt to correct obvious errors, but I expect that there are errors of grammar and possibly content that I did not discover before finalizing the note.

## 2019-02-07 NOTE — LETTER
[facility]  Viridiana Palumbo D.O.  [address] [place] [address]  Fax: [phone]   Authorization for Release/Disclosure of   Protected Health Information   Name: TOYA SHIELDS : 1965 SSN: [id number]   Address: [address] [place] [address] [facility] [address] Phone:    [phone] (home) [phone] (work)   I authorize the entity listed below to release/disclose the PHI below to:   [facility]/Viridiana Palumbo D.O. and PORFIRIO Bar   Provider or Entity Name:  [facility]   Address   [facility], [facility], Zip            [address] [address] [place] [address] Phone:  [phone]      Fax:       [phone]          Reason for request: continuity of care   Information to be released:    [ X ] LAST COLONOSCOPY,  including any PATH REPORT and follow-up  [ X ] LAST FIT/COLOGUARD RESULT [  ] LAST DEXA  [  ] LAST MAMMOGRAM  [  ] LAST PAP  [  ] LAST LABS [  ] RETINA EXAM REPORT  [  ] IMMUNIZATION RECORDS  [  ] Release all info          DATES OF SERVICE OR TIME PERIOD TO BE DISCLOSED: _____________  I understand and acknowledge that:  * This Authorization may be revoked at any time by you in writing, except if your health information has already been used or disclosed.  * Your health information that will be used or disclosed as a result of you signing this authorization could be re-disclosed by the recipient. If this occurs, your re-disclosed health information may no longer be protected by State or Federal laws.  * You may refuse to sign this Authorization. Your refusal will not affect your ability to obtain treatment.  * This Authorization becomes effective upon signing and will  on (date) __________.      If no date is indicated, this Authorization will  one (1) year from the signature date.    Name: Toya Shields     Date:     2019       PLEASE FAX REQUESTED RECORDS BACK TO: [phone]

## 2019-02-11 PROBLEM — B00.9 HSV INFECTION: Status: ACTIVE | Noted: 2019-02-11

## 2019-02-11 PROBLEM — F41.9 ANXIETY AND DEPRESSION: Status: ACTIVE | Noted: 2019-02-11

## 2019-02-11 PROBLEM — F32.A ANXIETY AND DEPRESSION: Status: ACTIVE | Noted: 2019-02-11

## 2019-02-11 NOTE — ASSESSMENT & PLAN NOTE
Has been on citalopram for years, but doesn't feel like it is working anymore.  Discussed issues, options, risks, benefits, and alternatives at length.  She would like to proceed with a trial of Lexapro.

## 2019-06-06 ENCOUNTER — OFFICE VISIT (OUTPATIENT)
Dept: MEDICAL GROUP | Facility: PHYSICIAN GROUP | Age: 54
End: 2019-06-06
Payer: COMMERCIAL

## 2019-06-06 VITALS
HEIGHT: 60 IN | DIASTOLIC BLOOD PRESSURE: 82 MMHG | OXYGEN SATURATION: 96 % | WEIGHT: 197 LBS | BODY MASS INDEX: 38.68 KG/M2 | TEMPERATURE: 98.3 F | HEART RATE: 78 BPM | RESPIRATION RATE: 16 BRPM | SYSTOLIC BLOOD PRESSURE: 116 MMHG

## 2019-06-06 DIAGNOSIS — F41.9 ANXIETY AND DEPRESSION: ICD-10-CM

## 2019-06-06 DIAGNOSIS — F32.A ANXIETY AND DEPRESSION: ICD-10-CM

## 2019-06-06 PROBLEM — Z76.89 ENCOUNTER TO ESTABLISH CARE WITH NEW DOCTOR: Status: RESOLVED | Noted: 2019-02-07 | Resolved: 2019-06-06

## 2019-06-06 PROCEDURE — 99214 OFFICE O/P EST MOD 30 MIN: CPT | Performed by: NURSE PRACTITIONER

## 2019-06-06 NOTE — LETTER
[facility]  PORFIRIO Bar  [address]  [facility] [address]  Fax: [phone]   Authorization for Release/Disclosure of   Protected Health Information   Name: TOYA WELLS : 1965 SSN: [id number]   Address: [address] [place] [address] Phone:    [phone] (home) [phone] (work)   I authorize the entity listed below to release/disclose the PHI below to:   [facility]/PORFIRIO Bar and PORFIRIO Bar   Provider or Entity Name:  [facility]   Address   City, [facility], Zip:               [address] [place] [address]   Phone:  [phone]      Fax:      [phone]        Reason for request: continuity of care   Information to be released:    [ X ] LAST COLONOSCOPY,  including any PATH REPORT and follow-up  [ X ] LAST FIT/COLOGUARD RESULT [  ] LAST DEXA  [  ] LAST MAMMOGRAM  [  ] LAST PAP  [  ] LAST LABS [  ] RETINA EXAM REPORT  [  ] IMMUNIZATION RECORDS  [  ] Release all info        DATES OF SERVICE OR TIME PERIOD TO BE DISCLOSED: _____________  I understand and acknowledge that:  * This Authorization may be revoked at any time by you in writing, except if your health information has already been used or disclosed.  * Your health information that will be used or disclosed as a result of you signing this authorization could be re-disclosed by the recipient. If this occurs, your re-disclosed health information may no longer be protected by State or Federal laws.  * You may refuse to sign this Authorization. Your refusal will not affect your ability to obtain treatment.  * This Authorization becomes effective upon signing and will  on (date) __________.      If no date is indicated, this Authorization will  one (1) year from the signature date.    Name: Toya Wells    Signature: continuity of care   Date:     2019       PLEASE FAX REQUESTED RECORDS BACK TO: [phone]

## 2019-06-06 NOTE — ASSESSMENT & PLAN NOTE
Thinks that the Lexapro is helping a bit with her anxiety, but not her mood.  She is depressed, unmotivated, and has been late to work regularly.  Her boss has had to speak to her about being on time.  Denies SI/HI.  Discussed options.  Will wean off of Lexapro and on to Zoloft.

## 2019-06-06 NOTE — PROGRESS NOTES
Chief Complaint   Patient presents with   • Medication Problem       HISTORY OF PRESENT ILLNESS: Patient is a 53 y.o. female established patient who presents today to discuss the following issues:    Anxiety and depression  Thinks that the Lexapro is helping a bit with her anxiety, but not her mood.  She is depressed, unmotivated, and has been late to work regularly.  Her boss has had to speak to her about being on time.  Denies SI/HI.  Discussed options.  Will wean off of Lexapro and on to Zoloft.        Patient Active Problem List    Diagnosis Date Noted   • HSV infection 02/11/2019   • Anxiety and depression 02/11/2019   • Complex tear of medial meniscus of right knee, initial encounter 09/28/2018       Allergies:Codeine    Current Outpatient Prescriptions   Medication Sig Dispense Refill   • sertraline (ZOLOFT) 50 MG Tab Take 1 Tab by mouth every day. 30 Tab 11   • vitamin E (VITAMIN E) 1000 UNIT Cap Take 1 Cap by mouth every day.     • valACYclovir (VALTREX) 1 GM Tab Take 0.5 Tabs by mouth 2 times a day as needed. 60 Tab 3     No current facility-administered medications for this visit.        Social History   Substance Use Topics   • Smoking status: Never Smoker   • Smokeless tobacco: Never Used   • Alcohol use Yes      Comment: 3 per month       Family Status   Relation Status   • Sis (Not Specified)     Family History   Problem Relation Age of Onset   • Diabetes Sister    • Clotting Disorder Sister         factor V       Review of Systems:   Constitutional: Negative for fever, chills, weight loss and malaise/fatigue.   HENT: Negative for ear pain, nosebleeds, congestion, sore throat and neck pain.    Eyes: Negative for blurred vision.   Respiratory: Negative for cough, sputum production, shortness of breath and wheezing.    Cardiovascular: Negative for chest pain, palpitations, orthopnea and leg swelling.   Gastrointestinal: Negative for heartburn, nausea, vomiting and abdominal pain.   Genitourinary:  Negative for dysuria, urgency and frequency.   Musculoskeletal: Negative for myalgias, joint pain, and back pain.  Skin: Negative for rash and itching.   Neurological: Negative for dizziness, tingling, tremors, sensory change, focal weakness and headaches.   Endo/Heme/Allergies: Does not bruise/bleed easily.   Psychiatric/Behavioral: Positive for depression and anxiety.  Denies SI/HI.  All other systems reviewed and are negative except as in HPI.    Exam:  /82   Pulse 78   Temp 36.8 °C (98.3 °F)   Resp 16   Ht 1.524 m (5')   Wt 89.4 kg (197 lb)   SpO2 96%   General:  Well nourished, well developed female in NAD  Head: Grossly normal.  Neck: Supple without JVD or bruit. Thyroid is not enlarged.  Pulmonary: Clear to ausculation. Normal effort. No rales, ronchi, or wheezing.  Cardiovascular: Regular rate and rhythm without murmur.   Abdomen:  Soft, nontender, nondistended.  Extremities: No clubbing, cyanosis, or edema.  Skin: Intact with no obvious rashes or lesions.  Neuro: Grossly intact.  Psych: Alert and oriented x 3.  Mood and affect appropriate.    Medical decision-making and discussion: Toya is here today for follow-up.  We discussed issues at length and sertraline was sent to her pharmacy.  She will follow-up here as needed.          Assessment/Plan:  1. Anxiety and depression  sertraline (ZOLOFT) 50 MG Tab       Return if symptoms worsen or fail to improve.    Please note that this dictation was created using voice recognition software. I have made every reasonable attempt to correct obvious errors, but I expect that there are errors of grammar and possibly content that I did not discover before finalizing the note.

## 2019-06-30 ENCOUNTER — HOSPITAL ENCOUNTER (OUTPATIENT)
Dept: RADIOLOGY | Facility: MEDICAL CENTER | Age: 54
End: 2019-06-30
Attending: NURSE PRACTITIONER
Payer: COMMERCIAL

## 2019-06-30 ENCOUNTER — OFFICE VISIT (OUTPATIENT)
Dept: URGENT CARE | Facility: PHYSICIAN GROUP | Age: 54
End: 2019-06-30
Payer: COMMERCIAL

## 2019-06-30 VITALS
OXYGEN SATURATION: 96 % | SYSTOLIC BLOOD PRESSURE: 120 MMHG | TEMPERATURE: 97.5 F | HEART RATE: 68 BPM | DIASTOLIC BLOOD PRESSURE: 86 MMHG | RESPIRATION RATE: 16 BRPM

## 2019-06-30 DIAGNOSIS — R05.8 PRODUCTIVE COUGH: ICD-10-CM

## 2019-06-30 DIAGNOSIS — J20.9 BRONCHITIS, ACUTE, WITH BRONCHOSPASM: ICD-10-CM

## 2019-06-30 DIAGNOSIS — B37.9 ANTIBIOTIC-INDUCED YEAST INFECTION: ICD-10-CM

## 2019-06-30 DIAGNOSIS — T36.95XA ANTIBIOTIC-INDUCED YEAST INFECTION: ICD-10-CM

## 2019-06-30 DIAGNOSIS — R09.81 NASAL CONGESTION WITH RHINORRHEA: ICD-10-CM

## 2019-06-30 DIAGNOSIS — J34.89 NASAL CONGESTION WITH RHINORRHEA: ICD-10-CM

## 2019-06-30 PROCEDURE — 71046 X-RAY EXAM CHEST 2 VIEWS: CPT

## 2019-06-30 PROCEDURE — 99214 OFFICE O/P EST MOD 30 MIN: CPT | Performed by: NURSE PRACTITIONER

## 2019-06-30 RX ORDER — ALBUTEROL SULFATE 90 UG/1
2 AEROSOL, METERED RESPIRATORY (INHALATION) EVERY 6 HOURS PRN
Qty: 8.5 G | Refills: 0 | Status: SHIPPED | OUTPATIENT
Start: 2019-06-30 | End: 2019-10-03

## 2019-06-30 RX ORDER — FLUCONAZOLE 150 MG/1
150 TABLET ORAL ONCE
Qty: 1 TAB | Refills: 0 | Status: SHIPPED | OUTPATIENT
Start: 2019-06-30 | End: 2019-06-30

## 2019-06-30 RX ORDER — DOXYCYCLINE HYCLATE 100 MG
100 TABLET ORAL 2 TIMES DAILY
Qty: 14 TAB | Refills: 0 | Status: SHIPPED | OUTPATIENT
Start: 2019-06-30 | End: 2019-07-07

## 2019-06-30 ASSESSMENT — ENCOUNTER SYMPTOMS
HEADACHES: 1
FEVER: 0
SORE THROAT: 1
WHEEZING: 0
BACK PAIN: 0
SENSORY CHANGE: 0
PALPITATIONS: 0
SINUS PAIN: 0
NAUSEA: 0
ABDOMINAL PAIN: 0
DIZZINESS: 0
SPUTUM PRODUCTION: 1
CONSTIPATION: 0
VOMITING: 0
MYALGIAS: 0
CHILLS: 0
DIARRHEA: 0
COUGH: 1
ORTHOPNEA: 0
WEAKNESS: 0
SHORTNESS OF BREATH: 0
TINGLING: 0

## 2019-06-30 NOTE — LETTER
June 30, 2019       Patient: Toya Shields   YOB: 1965   Date of Visit: 6/30/2019         To Whom It May Concern:    It is my medical opinion that Toya Shields be excused from work tomorrow due to illness.    If you have any questions or concerns, please don't hesitate to call [phone]          Sincerely,          SILVESTRE OkeefeN.P.  Electronically Signed

## 2019-08-22 ENCOUNTER — HOSPITAL ENCOUNTER (OUTPATIENT)
Dept: RADIOLOGY | Facility: MEDICAL CENTER | Age: 54
End: 2019-08-22
Attending: NURSE PRACTITIONER
Payer: COMMERCIAL

## 2019-08-22 DIAGNOSIS — Z12.31 VISIT FOR SCREENING MAMMOGRAM: ICD-10-CM

## 2019-08-22 PROCEDURE — 77063 BREAST TOMOSYNTHESIS BI: CPT

## 2019-08-26 ENCOUNTER — TELEPHONE (OUTPATIENT)
Dept: MEDICAL GROUP | Facility: PHYSICIAN GROUP | Age: 54
End: 2019-08-26

## 2019-08-26 NOTE — TELEPHONE ENCOUNTER
----- Message from PORFIRIO Bar sent at 8/26/2019  9:13 AM PDT -----  Please call patient and let her know that her mammogram was normal.    Thank you!  Joe

## 2019-10-03 ENCOUNTER — OFFICE VISIT (OUTPATIENT)
Dept: MEDICAL GROUP | Facility: PHYSICIAN GROUP | Age: 54
End: 2019-10-03
Payer: COMMERCIAL

## 2019-10-03 VITALS
OXYGEN SATURATION: 95 % | HEART RATE: 85 BPM | WEIGHT: 202 LBS | SYSTOLIC BLOOD PRESSURE: 110 MMHG | BODY MASS INDEX: 39.66 KG/M2 | DIASTOLIC BLOOD PRESSURE: 80 MMHG | TEMPERATURE: 96.9 F | RESPIRATION RATE: 16 BRPM | HEIGHT: 60 IN

## 2019-10-03 DIAGNOSIS — M54.12 CERVICAL RADICULOPATHY: ICD-10-CM

## 2019-10-03 DIAGNOSIS — Z23 NEED FOR VACCINATION: ICD-10-CM

## 2019-10-03 PROCEDURE — 99214 OFFICE O/P EST MOD 30 MIN: CPT | Mod: 25 | Performed by: NURSE PRACTITIONER

## 2019-10-03 PROCEDURE — 90686 IIV4 VACC NO PRSV 0.5 ML IM: CPT | Performed by: NURSE PRACTITIONER

## 2019-10-03 PROCEDURE — 90471 IMMUNIZATION ADMIN: CPT | Performed by: NURSE PRACTITIONER

## 2019-10-03 RX ORDER — METHYLPREDNISOLONE 4 MG/1
TABLET ORAL
Qty: 21 TAB | Refills: 0 | Status: SHIPPED | OUTPATIENT
Start: 2019-10-03 | End: 2019-11-07

## 2019-10-03 NOTE — PROGRESS NOTES
Chief Complaint   Patient presents with   • Neck Pain   • Immunizations       HISTORY OF PRESENT ILLNESS: Patient is a 54 y.o. female established patient who presents today to discuss the following issues:    Need for vaccination  Would like a flu shot today.      Cervical radiculopathy  Recently has been experiencing numbness and tingling that radiates down her left arm.  Long discussion of triggers.  Discussed treatment options, and she would like to proceed with a medrol dose pack.  If needed, we will proceed with an xray next.  She will work on stretching and avoiding triggers.      Patient Active Problem List    Diagnosis Date Noted   • Need for vaccination 10/08/2019   • Cervical radiculopathy 10/08/2019   • HSV infection 02/11/2019   • Anxiety and depression 02/11/2019   • Complex tear of medial meniscus of right knee, initial encounter 09/28/2018       Allergies:Codeine    Current Outpatient Medications   Medication Sig Dispense Refill   • Multiple Vitamin (MULTI-VITAMIN PO) Take  by mouth every day.     • methylPREDNISolone (MEDROL DOSEPAK) 4 MG Tablet Therapy Pack As directed on the packaging label. 21 Tab 0   • sertraline (ZOLOFT) 50 MG Tab Take 1 Tab by mouth every day. 30 Tab 11   • vitamin E (VITAMIN E) 1000 UNIT Cap Take 1 Cap by mouth every day.     • valACYclovir (VALTREX) 1 GM Tab Take 0.5 Tabs by mouth 2 times a day as needed. 60 Tab 3     No current facility-administered medications for this visit.        Social History     Tobacco Use   • Smoking status: Never Smoker   • Smokeless tobacco: Never Used   Substance Use Topics   • Alcohol use: Yes     Comment: 3 per month   • Drug use: No       Family Status   Relation Name Status   • Sis  (Not Specified)     Family History   Problem Relation Age of Onset   • Diabetes Sister    • Clotting Disorder Sister         factor V       Review of Systems:   Constitutional: Negative for fever, chills, weight loss and malaise/fatigue.   HENT: Negative for ear  pain, nosebleeds, congestion, sore throat and neck pain.    Eyes: Negative for blurred vision.   Respiratory: Negative for cough, sputum production, shortness of breath and wheezing.    Cardiovascular: Negative for chest pain, palpitations, orthopnea and leg swelling.   Gastrointestinal: Negative for heartburn, nausea, vomiting and abdominal pain.   Genitourinary: Negative for dysuria, urgency and frequency.   Musculoskeletal: Negative for myalgias, joint pain, and back pain.  Skin: Negative for rash and itching.   Neurological: Negative for dizziness, tingling, tremors, sensory change, focal weakness and headaches. Positive for radiculopathy in left arm.  Endo/Heme/Allergies: Does not bruise/bleed easily.   Psychiatric/Behavioral: Negative for depression, suicidal ideas and memory loss.  The patient is not nervous/anxious and does not have insomnia.    All other systems reviewed and are negative except as in HPI.    Exam:  Blood Pressure 110/80   Pulse 85   Temperature 36.1 °C (96.9 °F)   Respiration 16   Height 1.524 m (5')   Weight 91.6 kg (202 lb)   Oxygen Saturation 95%   General:  Well nourished, well developed female in NAD  Head: Grossly normal.  Neck: Supple without JVD or bruit. Thyroid is not enlarged.  Pulmonary: Clear to ausculation. Normal effort. No rales, ronchi, or wheezing.  Cardiovascular: Regular rate and rhythm without murmur.   Abdomen:  Soft, nontender, nondistended.  Extremities: No clubbing, cyanosis, or edema.  Skin: Intact with no obvious rashes or lesions.  Neuro: Grossly intact.  Psych: Alert and oriented x 3.  Mood and affect appropriate.    Medical decision-making and discussion: Toya is here today to discuss a few things.  Medrol was sent to her pharmacy, and she was given a flu shot.  She will follow-up here as needed.    I have placed the below orders and discussed them with an approved delegating provider. The MA is performing the below orders under the direction of   Real, who have provided verbal consent for supervision.            Assessment/Plan:  1. Need for vaccination  Influenza Vaccine Quad Injection (PF)   2. Cervical radiculopathy  methylPREDNISolone (MEDROL DOSEPAK) 4 MG Tablet Therapy Pack       Return if symptoms worsen or fail to improve.    Please note that this dictation was created using voice recognition software. I have made every reasonable attempt to correct obvious errors, but I expect that there are errors of grammar and possibly content that I did not discover before finalizing the note.

## 2019-10-08 PROBLEM — Z23 NEED FOR VACCINATION: Status: ACTIVE | Noted: 2019-10-08

## 2019-10-08 PROBLEM — M54.12 CERVICAL RADICULOPATHY: Status: ACTIVE | Noted: 2019-10-08

## 2019-10-08 NOTE — ASSESSMENT & PLAN NOTE
Recently has been experiencing numbness and tingling that radiates down her left arm.  Long discussion of triggers.  Discussed treatment options, and she would like to proceed with a medrol dose pack.  If needed, we will proceed with an xray next.  She will work on stretching and avoiding triggers.

## 2019-11-07 ENCOUNTER — OFFICE VISIT (OUTPATIENT)
Dept: MEDICAL GROUP | Facility: PHYSICIAN GROUP | Age: 54
End: 2019-11-07
Payer: COMMERCIAL

## 2019-11-07 ENCOUNTER — HOSPITAL ENCOUNTER (OUTPATIENT)
Dept: LAB | Facility: MEDICAL CENTER | Age: 54
End: 2019-11-07
Attending: NURSE PRACTITIONER
Payer: COMMERCIAL

## 2019-11-07 VITALS
HEIGHT: 60 IN | RESPIRATION RATE: 16 BRPM | DIASTOLIC BLOOD PRESSURE: 72 MMHG | OXYGEN SATURATION: 94 % | TEMPERATURE: 97.3 F | HEART RATE: 83 BPM | SYSTOLIC BLOOD PRESSURE: 110 MMHG | BODY MASS INDEX: 39.66 KG/M2 | WEIGHT: 202 LBS

## 2019-11-07 DIAGNOSIS — R10.84 GENERALIZED ABDOMINAL PAIN: ICD-10-CM

## 2019-11-07 DIAGNOSIS — G47.00 INSOMNIA, UNSPECIFIED TYPE: ICD-10-CM

## 2019-11-07 DIAGNOSIS — R10.32 LLQ PAIN: ICD-10-CM

## 2019-11-07 LAB
APPEARANCE UR: CLEAR
BILIRUB UR STRIP-MCNC: NORMAL MG/DL
COLOR UR AUTO: YELLOW
GLUCOSE UR STRIP.AUTO-MCNC: NORMAL MG/DL
KETONES UR STRIP.AUTO-MCNC: NORMAL MG/DL
LEUKOCYTE ESTERASE UR QL STRIP.AUTO: NORMAL
NITRITE UR QL STRIP.AUTO: NORMAL
PH UR STRIP.AUTO: 6 [PH] (ref 5–8)
PROT UR QL STRIP: NORMAL MG/DL
RBC UR QL AUTO: NORMAL
SP GR UR STRIP.AUTO: 1.03
UROBILINOGEN UR STRIP-MCNC: 0.2 MG/DL

## 2019-11-07 PROCEDURE — 81002 URINALYSIS NONAUTO W/O SCOPE: CPT | Performed by: NURSE PRACTITIONER

## 2019-11-07 PROCEDURE — 99214 OFFICE O/P EST MOD 30 MIN: CPT | Performed by: NURSE PRACTITIONER

## 2019-11-07 NOTE — PROGRESS NOTES
Chief Complaint   Patient presents with   • Abdominal Pain       HISTORY OF PRESENT ILLNESS: Patient is a 54 y.o. female established patient who presents today to discuss the following issues:    LLQ pain  Has been having issues with LLQ pain.  Hasn't been able to identify any pattern, doesn't seem to be affected by food.  Discussed options, and she would like to proceed with an ultrasound.       Patient Active Problem List    Diagnosis Date Noted   • LLQ pain 12/01/2019   • Need for vaccination 10/08/2019   • Cervical radiculopathy 10/08/2019   • HSV infection 02/11/2019   • Anxiety and depression 02/11/2019   • Complex tear of medial meniscus of right knee, initial encounter 09/28/2018       Allergies:Codeine    Current Outpatient Medications   Medication Sig Dispense Refill   • Multiple Vitamin (MULTI-VITAMIN PO) Take  by mouth every day.     • sertraline (ZOLOFT) 50 MG Tab Take 1 Tab by mouth every day. 30 Tab 11   • vitamin E (VITAMIN E) 1000 UNIT Cap Take 1 Cap by mouth every day.     • valACYclovir (VALTREX) 1 GM Tab Take 0.5 Tabs by mouth 2 times a day as needed. 60 Tab 3     No current facility-administered medications for this visit.        Social History     Tobacco Use   • Smoking status: Never Smoker   • Smokeless tobacco: Never Used   Substance Use Topics   • Alcohol use: Yes     Comment: 3 per month   • Drug use: No       Family Status   Relation Name Status   • Sis  (Not Specified)     Family History   Problem Relation Age of Onset   • Diabetes Sister    • Clotting Disorder Sister         factor V       Review of Systems:   Constitutional: Negative for fever, chills, weight loss and malaise/fatigue.   HENT: Negative for ear pain, nosebleeds, congestion, sore throat and neck pain.    Eyes: Negative for blurred vision.   Respiratory: Negative for cough, sputum production, shortness of breath and wheezing.    Cardiovascular: Negative for chest pain, palpitations, orthopnea and leg swelling.    Gastrointestinal: Negative for heartburn, nausea, vomiting.  Positive for intermittent LLQ pain.  Genitourinary: Negative for dysuria, urgency and frequency.   Musculoskeletal: Negative for myalgias, joint pain, and back pain.  Skin: Negative for rash and itching.   Neurological: Negative for dizziness, tingling, tremors, sensory change, focal weakness and headaches.   Endo/Heme/Allergies: Does not bruise/bleed easily.   Psychiatric/Behavioral: Negative for depression, suicidal ideas and memory loss.  The patient is not nervous/anxious and does not have insomnia.    All other systems reviewed and are negative except as in HPI.    Exam:  Blood Pressure 110/72   Pulse 83   Temperature 36.3 °C (97.3 °F)   Respiration 16   Height 1.524 m (5')   Weight 91.6 kg (202 lb)   Oxygen Saturation 94%   General:  Well nourished, well developed female in NAD  Head: Grossly normal.  Neck: Supple without JVD or bruit. Thyroid is not enlarged.  Pulmonary: Clear to ausculation. Normal effort. No rales, ronchi, or wheezing.  Cardiovascular: Regular rate and rhythm without murmur.   Abdomen:  Soft, nontender, nondistended.  Extremities: No clubbing, cyanosis, or edema.  Skin: Intact with no obvious rashes or lesions.  Neuro: Grossly intact.  Psych: Alert and oriented x 3.  Mood and affect appropriate.    Medical decision-making and discussion: Toya is here today to discuss a few things.  A urinalysis was negative, an ultrasound was ordered, and she will follow-up here as needed.          Assessment/Plan:  1. Generalized abdominal pain  POCT Urinalysis   2. LLQ pain  US-PELVIC TRANSABDOMINAL ONLY   3. Insomnia, unspecified type         Return if symptoms worsen or fail to improve.    Please note that this dictation was created using voice recognition software. I have made every reasonable attempt to correct obvious errors, but I expect that there are errors of grammar and possibly content that I did not discover before  finalizing the note.

## 2019-11-21 ENCOUNTER — APPOINTMENT (OUTPATIENT)
Dept: RADIOLOGY | Facility: MEDICAL CENTER | Age: 54
End: 2019-11-21
Attending: NURSE PRACTITIONER
Payer: COMMERCIAL

## 2019-12-01 PROBLEM — R10.32 LLQ PAIN: Status: ACTIVE | Noted: 2019-12-01

## 2019-12-01 NOTE — ASSESSMENT & PLAN NOTE
Has been having issues with LLQ pain.  Hasn't been able to identify any pattern, doesn't seem to be affected by food.  Discussed options, and she would like to proceed with an ultrasound.

## 2019-12-06 ENCOUNTER — HOSPITAL ENCOUNTER (OUTPATIENT)
Dept: RADIOLOGY | Facility: MEDICAL CENTER | Age: 54
End: 2019-12-06
Attending: NURSE PRACTITIONER
Payer: COMMERCIAL

## 2019-12-06 DIAGNOSIS — R10.32 LLQ PAIN: ICD-10-CM

## 2019-12-06 PROCEDURE — 76830 TRANSVAGINAL US NON-OB: CPT

## 2020-03-19 ENCOUNTER — EH NON-PROVIDER (OUTPATIENT)
Dept: OCCUPATIONAL MEDICINE | Facility: CLINIC | Age: 55
End: 2020-03-19

## 2020-03-19 DIAGNOSIS — Z02.89 ENCOUNTER FOR OCCUPATIONAL HEALTH EXAMINATION INVOLVING RESPIRATOR: Primary | ICD-10-CM

## 2020-03-19 PROCEDURE — 94375 RESPIRATORY FLOW VOLUME LOOP: CPT | Performed by: NURSE PRACTITIONER

## 2020-03-26 ENCOUNTER — APPOINTMENT (RX ONLY)
Dept: URBAN - METROPOLITAN AREA CLINIC 35 | Facility: CLINIC | Age: 55
Setting detail: DERMATOLOGY
End: 2020-03-26

## 2020-03-26 DIAGNOSIS — L81.4 OTHER MELANIN HYPERPIGMENTATION: ICD-10-CM

## 2020-03-26 DIAGNOSIS — L82.1 OTHER SEBORRHEIC KERATOSIS: ICD-10-CM

## 2020-03-26 DIAGNOSIS — Z71.89 OTHER SPECIFIED COUNSELING: ICD-10-CM

## 2020-03-26 DIAGNOSIS — L82.0 INFLAMED SEBORRHEIC KERATOSIS: ICD-10-CM

## 2020-03-26 DIAGNOSIS — D22 MELANOCYTIC NEVI: ICD-10-CM

## 2020-03-26 DIAGNOSIS — D18.0 HEMANGIOMA: ICD-10-CM

## 2020-03-26 PROBLEM — D22.5 MELANOCYTIC NEVI OF TRUNK: Status: ACTIVE | Noted: 2020-03-26

## 2020-03-26 PROBLEM — D18.01 HEMANGIOMA OF SKIN AND SUBCUTANEOUS TISSUE: Status: ACTIVE | Noted: 2020-03-26

## 2020-03-26 PROBLEM — D22.0 MELANOCYTIC NEVI OF LIP: Status: ACTIVE | Noted: 2020-03-26

## 2020-03-26 PROCEDURE — ? COUNSELING

## 2020-03-26 PROCEDURE — 17110 DESTRUCTION B9 LES UP TO 14: CPT

## 2020-03-26 PROCEDURE — ? LIQUID NITROGEN

## 2020-03-26 PROCEDURE — 99202 OFFICE O/P NEW SF 15 MIN: CPT | Mod: 25

## 2020-03-26 ASSESSMENT — LOCATION SIMPLE DESCRIPTION DERM
LOCATION SIMPLE: LEFT LIP
LOCATION SIMPLE: RIGHT FOREHEAD
LOCATION SIMPLE: UPPER BACK
LOCATION SIMPLE: LEFT THIGH
LOCATION SIMPLE: LOWER BACK
LOCATION SIMPLE: RIGHT UPPER BACK

## 2020-03-26 ASSESSMENT — LOCATION DETAILED DESCRIPTION DERM
LOCATION DETAILED: INFERIOR THORACIC SPINE
LOCATION DETAILED: SUPERIOR THORACIC SPINE
LOCATION DETAILED: RIGHT INFERIOR MEDIAL UPPER BACK
LOCATION DETAILED: RIGHT INFERIOR FOREHEAD
LOCATION DETAILED: SUPERIOR LUMBAR SPINE
LOCATION DETAILED: LEFT ANTERIOR PROXIMAL THIGH
LOCATION DETAILED: LEFT UPPER CUTANEOUS LIP

## 2020-03-26 ASSESSMENT — LOCATION ZONE DERM
LOCATION ZONE: LEG
LOCATION ZONE: TRUNK
LOCATION ZONE: LIP
LOCATION ZONE: FACE

## 2020-03-26 NOTE — PROCEDURE: LIQUID NITROGEN
Add 52 Modifier (Optional): no
Number Of Freeze-Thaw Cycles: 2 freeze-thaw cycles
Medical Necessity Information: It is in your best interest to select a reason for this procedure from the list below. All of these items fulfill various CMS LCD requirements except the new and changing color options.
Medical Necessity Clause: This procedure was medically necessary because the lesions that were treated were:
Detail Level: Detailed
Post-Care Instructions: I reviewed with the patient in detail post-care instructions. Patient is to wear sunprotection, and avoid picking at any of the treated lesions. Pt may apply Vaseline to crusted or scabbing areas.
Duration Of Freeze Thaw-Cycle (Seconds): 10
Consent: The patient's consent was obtained including but not limited to risks of crusting, scabbing, blistering, scarring, darker or lighter pigmentary change, recurrence, incomplete removal and infection.

## 2020-04-07 DIAGNOSIS — F41.9 ANXIETY AND DEPRESSION: ICD-10-CM

## 2020-04-07 DIAGNOSIS — F32.A ANXIETY AND DEPRESSION: ICD-10-CM

## 2020-04-07 NOTE — TELEPHONE ENCOUNTER
Was the patient seen in the last year in this department? Yes    Does patient have an active prescription for medications requested? No     Received Request Via: Pharmacy      Pt met protocol?: Yes, last ov 11/19  Last labs 3/18

## 2020-08-07 DIAGNOSIS — Z20.822 EXPOSURE TO COVID-19 VIRUS: ICD-10-CM

## 2020-08-08 ENCOUNTER — HOSPITAL ENCOUNTER (OUTPATIENT)
Dept: LAB | Facility: MEDICAL CENTER | Age: 55
End: 2020-08-08
Attending: PATHOLOGY
Payer: COMMERCIAL

## 2020-08-08 LAB
COVID ORDER STATUS COVID19: NORMAL
SARS-COV-2 RNA RESP QL NAA+PROBE: NOTDETECTED
SPECIMEN SOURCE: NORMAL

## 2020-08-08 PROCEDURE — C9803 HOPD COVID-19 SPEC COLLECT: HCPCS

## 2020-08-08 PROCEDURE — U0003 INFECTIOUS AGENT DETECTION BY NUCLEIC ACID (DNA OR RNA); SEVERE ACUTE RESPIRATORY SYNDROME CORONAVIRUS 2 (SARS-COV-2) (CORONAVIRUS DISEASE [COVID-19]), AMPLIFIED PROBE TECHNIQUE, MAKING USE OF HIGH THROUGHPUT TECHNOLOGIES AS DESCRIBED BY CMS-2020-01-R: HCPCS

## 2020-09-22 ENCOUNTER — NON-PROVIDER VISIT (OUTPATIENT)
Dept: MEDICAL GROUP | Facility: PHYSICIAN GROUP | Age: 55
End: 2020-09-22

## 2020-09-22 DIAGNOSIS — Z23 NEED FOR VACCINATION: ICD-10-CM

## 2020-09-22 PROCEDURE — 90686 IIV4 VACC NO PRSV 0.5 ML IM: CPT | Performed by: FAMILY MEDICINE

## 2020-09-22 NOTE — PROGRESS NOTES
"Toya Shields is a 55 y.o. female here for a non-provider visit for:   FLU    Reason for immunization: Annual Flu Vaccine  Immunization records indicate need for vaccine: Yes, confirmed with Epic and confirmed with [place] [facility]  Minimum interval has been met for this vaccine: Yes  ABN completed: Not Indicated    Order and dose verified by: MT  VIS Dated  08/15/19 was given to patient: Yes  All IAC Questionnaire questions were answered \"No.\"    Patient tolerated injection and no adverse effects were observed or reported: Yes    Pt scheduled for next dose in series: No    "

## 2020-10-07 ENCOUNTER — APPOINTMENT (OUTPATIENT)
Dept: BEHAVIORAL HEALTH | Facility: CLINIC | Age: 55
End: 2020-10-07
Payer: COMMERCIAL

## 2020-10-20 DIAGNOSIS — F41.9 ANXIETY AND DEPRESSION: ICD-10-CM

## 2020-10-20 DIAGNOSIS — F32.A ANXIETY AND DEPRESSION: ICD-10-CM

## 2020-12-17 ENCOUNTER — APPOINTMENT (OUTPATIENT)
Dept: MEDICAL GROUP | Facility: PHYSICIAN GROUP | Age: 55
End: 2020-12-17
Payer: COMMERCIAL

## 2020-12-20 DIAGNOSIS — Z23 NEED FOR VACCINATION: ICD-10-CM

## 2020-12-21 ENCOUNTER — APPOINTMENT (OUTPATIENT)
Dept: FAMILY PLANNING/WOMEN'S HEALTH CLINIC | Facility: IMMUNIZATION CENTER | Age: 55
End: 2020-12-21
Attending: FAMILY MEDICINE
Payer: COMMERCIAL

## 2020-12-21 DIAGNOSIS — Z23 NEED FOR VACCINATION: ICD-10-CM

## 2020-12-21 PROCEDURE — 91300 PFIZER SARS-COV-2 VACCINE: CPT

## 2020-12-21 PROCEDURE — 0001A PFIZER SARS-COV-2 VACCINE: CPT

## 2020-12-22 ENCOUNTER — IMMUNIZATION (OUTPATIENT)
Dept: FAMILY PLANNING/WOMEN'S HEALTH CLINIC | Facility: IMMUNIZATION CENTER | Age: 55
End: 2020-12-22
Payer: COMMERCIAL

## 2020-12-22 DIAGNOSIS — Z23 ENCOUNTER FOR VACCINATION: Primary | ICD-10-CM

## 2021-01-10 ENCOUNTER — IMMUNIZATION (OUTPATIENT)
Dept: FAMILY PLANNING/WOMEN'S HEALTH CLINIC | Facility: IMMUNIZATION CENTER | Age: 56
End: 2021-01-10
Attending: FAMILY MEDICINE
Payer: COMMERCIAL

## 2021-01-10 DIAGNOSIS — Z23 ENCOUNTER FOR VACCINATION: Primary | ICD-10-CM

## 2021-01-10 PROCEDURE — 0002A PFIZER SARS-COV-2 VACCINE: CPT

## 2021-01-10 PROCEDURE — 91300 PFIZER SARS-COV-2 VACCINE: CPT

## 2021-01-11 ENCOUNTER — TELEMEDICINE (OUTPATIENT)
Dept: MEDICAL GROUP | Facility: PHYSICIAN GROUP | Age: 56
End: 2021-01-11
Payer: COMMERCIAL

## 2021-01-11 VITALS — WEIGHT: 197 LBS | HEIGHT: 60 IN | BODY MASS INDEX: 38.68 KG/M2 | RESPIRATION RATE: 12 BRPM

## 2021-01-11 DIAGNOSIS — F32.A ANXIETY AND DEPRESSION: ICD-10-CM

## 2021-01-11 DIAGNOSIS — E55.9 VITAMIN D DEFICIENCY: ICD-10-CM

## 2021-01-11 DIAGNOSIS — E78.5 DYSLIPIDEMIA: ICD-10-CM

## 2021-01-11 DIAGNOSIS — B00.9 HSV INFECTION: ICD-10-CM

## 2021-01-11 DIAGNOSIS — F41.9 ANXIETY AND DEPRESSION: ICD-10-CM

## 2021-01-11 DIAGNOSIS — Z12.31 ENCOUNTER FOR SCREENING MAMMOGRAM FOR BREAST CANCER: ICD-10-CM

## 2021-01-11 DIAGNOSIS — Z00.00 GENERAL MEDICAL EXAM: ICD-10-CM

## 2021-01-11 DIAGNOSIS — Z23 NEED FOR VACCINATION: ICD-10-CM

## 2021-01-11 DIAGNOSIS — R19.7 DIARRHEA OF PRESUMED INFECTIOUS ORIGIN: ICD-10-CM

## 2021-01-11 DIAGNOSIS — R73.03 PREDIABETES: ICD-10-CM

## 2021-01-11 DIAGNOSIS — Z11.59 NEED FOR HEPATITIS C SCREENING TEST: ICD-10-CM

## 2021-01-11 PROCEDURE — 99214 OFFICE O/P EST MOD 30 MIN: CPT | Performed by: PHYSICIAN ASSISTANT

## 2021-01-11 RX ORDER — VALACYCLOVIR HYDROCHLORIDE 1 G/1
500 TABLET, FILM COATED ORAL 2 TIMES DAILY PRN
Qty: 60 TAB | Refills: 3 | Status: SHIPPED | OUTPATIENT
Start: 2021-01-11 | End: 2021-03-16

## 2021-01-11 ASSESSMENT — PATIENT HEALTH QUESTIONNAIRE - PHQ9
8. MOVING OR SPEAKING SO SLOWLY THAT OTHER PEOPLE COULD HAVE NOTICED. OR THE OPPOSITE, BEING SO FIGETY OR RESTLESS THAT YOU HAVE BEEN MOVING AROUND A LOT MORE THAN USUAL: NOT AT ALL
3. TROUBLE FALLING OR STAYING ASLEEP OR SLEEPING TOO MUCH: MORE THAN HALF THE DAYS
9. THOUGHTS THAT YOU WOULD BE BETTER OFF DEAD, OR OF HURTING YOURSELF: NOT AT ALL
5. POOR APPETITE OR OVEREATING: NOT AT ALL
4. FEELING TIRED OR HAVING LITTLE ENERGY: SEVERAL DAYS
SUM OF ALL RESPONSES TO PHQ9 QUESTIONS 1 AND 2: 0
6. FEELING BAD ABOUT YOURSELF - OR THAT YOU ARE A FAILURE OR HAVE LET YOURSELF OR YOUR FAMILY DOWN: NOT AL ALL
2. FEELING DOWN, DEPRESSED, IRRITABLE, OR HOPELESS: NOT AT ALL
7. TROUBLE CONCENTRATING ON THINGS, SUCH AS READING THE NEWSPAPER OR WATCHING TELEVISION: NOT AT ALL
SUM OF ALL RESPONSES TO PHQ QUESTIONS 1-9: 3
1. LITTLE INTEREST OR PLEASURE IN DOING THINGS: NOT AT ALL

## 2021-01-11 NOTE — ASSESSMENT & PLAN NOTE
This is a chronic condition.  Last A1c:   Lab Results   Component Value Date/Time    HBA1C 5.7 (H) 03/08/2018 0809    AVGLUC 117 03/08/2018 0809     Diet/ Exercise: diet could use improvement- daughter and her starting working regimen, starting meal prepping as well.   FH DM? Not primary relatives.

## 2021-01-11 NOTE — ASSESSMENT & PLAN NOTE
Take valtrex as needed. Needs refill today. Usually takes 3-5 days, gets an outbreak maybe once very other month.

## 2021-01-11 NOTE — PROGRESS NOTES
Virtual Visit: Established Patient   This visit was conducted via Zoom using secure and encrypted videoconferencing technology. The patient was in a private location in the state of [place].    The patient's identity was confirmed and verbal consent was obtained for this virtual visit.    Subjective:   CC:   Chief Complaint   Patient presents with   • Establish Care   • Dyslipidemia       Toya Shields is a 55 y.o. female presenting for evaluation and management of:    Health Maintenance:   Ordered mammogram.   Discussed Hep C screen- 2 tattoos      Anxiety and depression  Chronic condition.  Stable.  Patient also 50 mg daily. Feels good on this dose. No CBT right now, did go to counseling after a co-work passed away from COVID.     HSV infection  Take valtrex as needed. Needs refill today. Usually takes 3-5 days, gets an outbreak maybe once very other month.     Vitamin D deficiency  This is a  chronic condition.   Supplementation: recommend 2000 IU daily.   Last Vitamin D level:   VIT D:   Lab Results   Component Value Date/Time    25HYDROXY 21 (L) 03/08/2018 0809     Patient denies any muscle aches or fatigue.       Prediabetes  This is a chronic condition.  Last A1c:   Lab Results   Component Value Date/Time    HBA1C 5.7 (H) 03/08/2018 0809    AVGLUC 117 03/08/2018 0809     Diet/ Exercise: diet could use improvement- daughter and her starting working regimen, starting meal prepping as well.   FH DM? Not primary relatives.       Dyslipidemia  This is a chronic condition.   Latest Labs:   Lab Results   Component Value Date/Time    CHOLSTRLTOT 233 (H) 03/08/2018 08:09 AM     (H) 03/08/2018 08:09 AM    HDL 36 (A) 03/08/2018 08:09 AM    TRIGLYCERIDE 155 (H) 03/08/2018 08:09 AM      Medications: none current.   Family History of high cholesterol or heart disease? FH high cholesterol. No FH heart disease.   Risk calculator: The 10-year ASCVD risk score (Sabinejonathon LEWIS Jr., et al., 2013) is: 2.8%   j    Diarrhea  of presumed infectious origin  On Friday went to [facility] and had GI upset and diarrhea started 2 hours after eating. No blood in stools. Some nausea, no vomiting. No fever or chills.       ROS   Denies any recent fevers or chills. No nausea or vomiting. No chest pains or shortness of breath.     Allergies   Allergen Reactions   • Codeine Vomiting       Current medicines (including changes today)  Current Outpatient Medications   Medication Sig Dispense Refill   • Zoster Vac Recomb Adjuvanted (SHINGRIX) 50 MCG/0.5ML Recon Susp Inject 0.5 mL into the shoulder, thigh, or buttocks one time for 1 dose. 0.5 mL 0   • valacyclovir (VALTREX) 1 GM Tab Take 0.5 Tabs by mouth 2 times a day as needed. 60 Tab 3   • sertraline (ZOLOFT) 50 MG Tab Take 1 Tab by mouth every day. 90 Tab 0   • Multiple Vitamin (MULTI-VITAMIN PO) Take  by mouth every day.     • vitamin E (VITAMIN E) 1000 UNIT Cap Take 1 Cap by mouth every day.       No current facility-administered medications for this visit.        Patient Active Problem List    Diagnosis Date Noted   • Vitamin D deficiency 01/11/2021   • Prediabetes 01/11/2021   • Dyslipidemia 01/11/2021   • Diarrhea of presumed infectious origin 01/11/2021   • LLQ pain 12/01/2019   • Need for vaccination 10/08/2019   • Cervical radiculopathy 10/08/2019   • HSV infection 02/11/2019   • Anxiety and depression 02/11/2019   • Complex tear of medial meniscus of right knee, initial encounter 09/28/2018       Family History   Problem Relation Age of Onset   • Diabetes Sister    • Clotting Disorder Sister         factor V       She  has a past medical history of Anxiety, Arthritis, Depression, and Pain.  She  has a past surgical history that includes tonsillectomy (1966); hand surgery (Right, 2000); hysterectomy, vaginal (2000); debi by laparoscopy (2008); knee arthroscopy (Right, 9/28/2018); medial meniscectomy (Right, 9/28/2018); and chondroplasty (Right, 9/28/2018).       Objective:   Resp 12   Ht  1.524 m (5')   Wt 89.4 kg (197 lb)   BMI 38.47 kg/m²     Physical Exam:  Constitutional: Alert, no distress, well-groomed.  Skin: No rashes in visible areas.  Eye: Round. Conjunctiva clear, lids normal. No icterus.   ENMT: Lips pink without lesions, good dentition, moist mucous membranes. Phonation normal.  Neck: No masses, no thyromegaly. Moves freely without pain.  Respiratory: Unlabored respiratory effort, no cough or audible wheeze  Psych: Alert and oriented x3, normal affect and mood.       Assessment and Plan:   The following treatment plan was discussed:     1. Anxiety and depression  Chronic condition.  Stable.Continue sertraline 50 mg daily.  2. HSV infection  - valacyclovir (VALTREX) 1 GM Tab; Take 0.5 Tabs by mouth 2 times a day as needed.  Dispense: 60 Tab; Refill: 3  Chronic recurrent condition usually an outbreak every other month.  Valtrex is very effective for her.  Needs refills today  3. Vitamin D deficiency  - VITAMIN D,25 HYDROXY; Future  Recommend vitamin D supplementation 2000 IUs a day, will get blood work.  4. Prediabetes  Due for updated labs, patient working on lifestyle modifications.  5. Dyslipidemia  - Lipid Profile; Future  Due for updated labs, patient daughter starting meal prepping and exercise regimen.  6. Need for hepatitis C screening test  - HCV Scrn ( [id number] 1xLife); Future    7. Need for vaccination  - Zoster Vac Recomb Adjuvanted (SHINGRIX) 50 MCG/0.5ML Recon Susp; Inject 0.5 mL into the shoulder, thigh, or buttocks one time for 1 dose.  Dispense: 0.5 mL; Refill: 0    8. General medical exam  - CBC WITH DIFFERENTIAL; Future  - Comp Metabolic Panel; Future  - TSH; Future    9. Diarrhea of presumed infectious origin  Patient went to [facility] on Friday and 2 hours afterwards started having diarrhea.  Still having some diarrhea and mild nausea no vomiting.  No blood in her stools.  No fever or chills.  Presumed food poisoning.  Discussed hydration levels.  Can take  Imodium as needed.  If symptoms not resolved in the next 24 to 48 hours of like her to let me know.  Continue stool culture.  10. Encounter for screening mammogram for breast cancer  - MA-SCREENING MAMMO BILAT W/TOMOSYNTHESIS W/CAD; Future        Follow-up: Return in about 4 weeks (around 2/8/2021) for Follow up on labs.        The patient verbalized agreement and understanding of current plan. All questions and concerns were addressed at time of visit.    Please note that this dictation was created using voice recognition software. I have made every reasonable attempt to correct obvious errors, but I expect that there are errors of grammar and possibly content that I did not discover before finalizing the note.

## 2021-01-11 NOTE — ASSESSMENT & PLAN NOTE
Chronic condition.  Stable.  Patient also 50 mg daily. Feels good on this dose. No CBT right now, did go to counseling after a co-work passed away from COVID.

## 2021-01-11 NOTE — ASSESSMENT & PLAN NOTE
This is a  chronic condition.   Supplementation: recommend 2000 IU daily.   Last Vitamin D level:   VIT D:   Lab Results   Component Value Date/Time    25HYDROXY 21 (L) 03/08/2018 0809     Patient denies any muscle aches or fatigue.

## 2021-01-11 NOTE — ASSESSMENT & PLAN NOTE
This is a chronic condition.   Latest Labs:   Lab Results   Component Value Date/Time    CHOLSTRLTOT 233 (H) 03/08/2018 08:09 AM     (H) 03/08/2018 08:09 AM    HDL 36 (A) 03/08/2018 08:09 AM    TRIGLYCERIDE 155 (H) 03/08/2018 08:09 AM      Medications: none current.   Family History of high cholesterol or heart disease? FH high cholesterol. No FH heart disease.   Risk calculator: The 10-year ASCVD risk score (Sabine LEWIS Jr., et al., 2013) is: 2.8%   j

## 2021-01-11 NOTE — ASSESSMENT & PLAN NOTE
On Friday went to [facility] and had GI upset and diarrhea started 2 hours after eating. No blood in stools. Some nausea, no vomiting. No fever or chills.

## 2021-01-12 DIAGNOSIS — F41.9 ANXIETY AND DEPRESSION: ICD-10-CM

## 2021-01-12 DIAGNOSIS — F32.A ANXIETY AND DEPRESSION: ICD-10-CM

## 2021-03-11 DIAGNOSIS — B00.9 HSV INFECTION: ICD-10-CM

## 2021-03-16 RX ORDER — VALACYCLOVIR HYDROCHLORIDE 1 G/1
500 TABLET, FILM COATED ORAL 2 TIMES DAILY PRN
Qty: 60 TABLET | Refills: 2 | Status: SHIPPED | OUTPATIENT
Start: 2021-03-16 | End: 2021-09-16

## 2021-03-31 ENCOUNTER — APPOINTMENT (OUTPATIENT)
Dept: RADIOLOGY | Facility: MEDICAL CENTER | Age: 56
End: 2021-03-31
Attending: PHYSICIAN ASSISTANT
Payer: COMMERCIAL

## 2021-05-06 ENCOUNTER — HOSPITAL ENCOUNTER (OUTPATIENT)
Dept: LAB | Facility: MEDICAL CENTER | Age: 56
End: 2021-05-06
Attending: PHYSICIAN ASSISTANT
Payer: COMMERCIAL

## 2021-05-06 DIAGNOSIS — E78.5 DYSLIPIDEMIA: ICD-10-CM

## 2021-05-06 DIAGNOSIS — Z11.59 NEED FOR HEPATITIS C SCREENING TEST: ICD-10-CM

## 2021-05-06 DIAGNOSIS — E55.9 VITAMIN D DEFICIENCY: ICD-10-CM

## 2021-05-06 DIAGNOSIS — Z00.00 GENERAL MEDICAL EXAM: ICD-10-CM

## 2021-05-06 LAB
ALBUMIN SERPL BCP-MCNC: 4.1 G/DL (ref 3.2–4.9)
ALBUMIN/GLOB SERPL: 1.4 G/DL
ALP SERPL-CCNC: 121 U/L (ref 30–99)
ALT SERPL-CCNC: 41 U/L (ref 2–50)
ANION GAP SERPL CALC-SCNC: 9 MMOL/L (ref 7–16)
AST SERPL-CCNC: 36 U/L (ref 12–45)
BASOPHILS # BLD AUTO: 0.9 % (ref 0–1.8)
BASOPHILS # BLD: 0.07 K/UL (ref 0–0.12)
BILIRUB SERPL-MCNC: 0.3 MG/DL (ref 0.1–1.5)
BUN SERPL-MCNC: 12 MG/DL (ref 8–22)
CALCIUM SERPL-MCNC: 9.2 MG/DL (ref 8.5–10.5)
CHLORIDE SERPL-SCNC: 105 MMOL/L (ref 96–112)
CHOLEST SERPL-MCNC: 229 MG/DL (ref 100–199)
CO2 SERPL-SCNC: 26 MMOL/L (ref 20–33)
CREAT SERPL-MCNC: 0.73 MG/DL (ref 0.5–1.4)
EOSINOPHIL # BLD AUTO: 0.28 K/UL (ref 0–0.51)
EOSINOPHIL NFR BLD: 3.8 % (ref 0–6.9)
ERYTHROCYTE [DISTWIDTH] IN BLOOD BY AUTOMATED COUNT: 45.1 FL (ref 35.9–50)
FASTING STATUS PATIENT QL REPORTED: NORMAL
GLOBULIN SER CALC-MCNC: 2.9 G/DL (ref 1.9–3.5)
GLUCOSE SERPL-MCNC: 94 MG/DL (ref 65–99)
HCT VFR BLD AUTO: 45.9 % (ref 37–47)
HCV AB SER QL: NORMAL
HDLC SERPL-MCNC: 29 MG/DL
HGB BLD-MCNC: 15.3 G/DL (ref 12–16)
IMM GRANULOCYTES # BLD AUTO: 0.02 K/UL (ref 0–0.11)
IMM GRANULOCYTES NFR BLD AUTO: 0.3 % (ref 0–0.9)
LDLC SERPL CALC-MCNC: 159 MG/DL
LYMPHOCYTES # BLD AUTO: 2.38 K/UL (ref 1–4.8)
LYMPHOCYTES NFR BLD: 31.9 % (ref 22–41)
MCH RBC QN AUTO: 32.3 PG (ref 27–33)
MCHC RBC AUTO-ENTMCNC: 33.3 G/DL (ref 33.6–35)
MCV RBC AUTO: 96.8 FL (ref 81.4–97.8)
MONOCYTES # BLD AUTO: 0.44 K/UL (ref 0–0.85)
MONOCYTES NFR BLD AUTO: 5.9 % (ref 0–13.4)
NEUTROPHILS # BLD AUTO: 4.27 K/UL (ref 2–7.15)
NEUTROPHILS NFR BLD: 57.2 % (ref 44–72)
NRBC # BLD AUTO: 0 K/UL
NRBC BLD-RTO: 0 /100 WBC
PLATELET # BLD AUTO: 329 K/UL (ref 164–446)
PMV BLD AUTO: 9.3 FL (ref 9–12.9)
POTASSIUM SERPL-SCNC: 4.2 MMOL/L (ref 3.6–5.5)
PROT SERPL-MCNC: 7 G/DL (ref 6–8.2)
RBC # BLD AUTO: 4.74 M/UL (ref 4.2–5.4)
SODIUM SERPL-SCNC: 140 MMOL/L (ref 135–145)
TRIGL SERPL-MCNC: 206 MG/DL (ref 0–149)
TSH SERPL DL<=0.005 MIU/L-ACNC: 2.4 UIU/ML (ref 0.38–5.33)
WBC # BLD AUTO: 7.5 K/UL (ref 4.8–10.8)

## 2021-05-06 PROCEDURE — 80053 COMPREHEN METABOLIC PANEL: CPT

## 2021-05-06 PROCEDURE — 85025 COMPLETE CBC W/AUTO DIFF WBC: CPT

## 2021-05-06 PROCEDURE — 82306 VITAMIN D 25 HYDROXY: CPT

## 2021-05-06 PROCEDURE — 80061 LIPID PANEL: CPT

## 2021-05-06 PROCEDURE — 84443 ASSAY THYROID STIM HORMONE: CPT

## 2021-05-06 PROCEDURE — G0472 HEP C SCREEN HIGH RISK/OTHER: HCPCS

## 2021-05-06 PROCEDURE — 36415 COLL VENOUS BLD VENIPUNCTURE: CPT

## 2021-05-07 LAB — 25(OH)D3 SERPL-MCNC: 19 NG/ML (ref 30–80)

## 2021-05-10 ENCOUNTER — TELEPHONE (OUTPATIENT)
Dept: MEDICAL GROUP | Facility: PHYSICIAN GROUP | Age: 56
End: 2021-05-10

## 2021-05-10 NOTE — TELEPHONE ENCOUNTER
----- Message from Malissa Tam P.A.-C. sent at 5/10/2021 12:48 PM PDT -----  Please call patient about their labs.     There are a few things on their labs I'd like to talk about. Please schedule a follow up.     Thank you,    Malissa Tam P.A.-C.

## 2021-05-24 NOTE — ASSESSMENT & PLAN NOTE
Discuss medication.   FH high cholesterol. Her diet has not been good- she has been doing Sissy Jan, has already lost 4 pounds.

## 2021-05-26 ENCOUNTER — OFFICE VISIT (OUTPATIENT)
Dept: MEDICAL GROUP | Facility: PHYSICIAN GROUP | Age: 56
End: 2021-05-26
Payer: COMMERCIAL

## 2021-05-26 VITALS
TEMPERATURE: 97 F | BODY MASS INDEX: 38.48 KG/M2 | HEIGHT: 60 IN | RESPIRATION RATE: 12 BRPM | HEART RATE: 88 BPM | WEIGHT: 196 LBS | SYSTOLIC BLOOD PRESSURE: 90 MMHG | OXYGEN SATURATION: 95 % | DIASTOLIC BLOOD PRESSURE: 72 MMHG

## 2021-05-26 DIAGNOSIS — E55.9 VITAMIN D DEFICIENCY: ICD-10-CM

## 2021-05-26 DIAGNOSIS — R73.03 PREDIABETES: ICD-10-CM

## 2021-05-26 DIAGNOSIS — E78.5 DYSLIPIDEMIA: ICD-10-CM

## 2021-05-26 DIAGNOSIS — F32.A ANXIETY AND DEPRESSION: ICD-10-CM

## 2021-05-26 DIAGNOSIS — F41.9 ANXIETY AND DEPRESSION: ICD-10-CM

## 2021-05-26 PROCEDURE — 99214 OFFICE O/P EST MOD 30 MIN: CPT | Performed by: PHYSICIAN ASSISTANT

## 2021-05-26 RX ORDER — ROSUVASTATIN CALCIUM 5 MG/1
5 TABLET, COATED ORAL EVERY EVENING
Qty: 30 TABLET | Refills: 11 | Status: SHIPPED | OUTPATIENT
Start: 2021-05-26 | End: 2021-09-09 | Stop reason: SDUPTHER

## 2021-05-26 RX ORDER — SERTRALINE HYDROCHLORIDE 100 MG/1
100 TABLET, FILM COATED ORAL DAILY
Qty: 30 TABLET | Refills: 11 | Status: SHIPPED | OUTPATIENT
Start: 2021-05-26 | End: 2021-05-26

## 2021-05-26 ASSESSMENT — PATIENT HEALTH QUESTIONNAIRE - PHQ9
CLINICAL INTERPRETATION OF PHQ2 SCORE: 4
5. POOR APPETITE OR OVEREATING: 0 - NOT AT ALL
SUM OF ALL RESPONSES TO PHQ QUESTIONS 1-9: 12

## 2021-05-26 ASSESSMENT — ANXIETY QUESTIONNAIRES
6. BECOMING EASILY ANNOYED OR IRRITABLE: MORE THAN HALF THE DAYS
2. NOT BEING ABLE TO STOP OR CONTROL WORRYING: NEARLY EVERY DAY
5. BEING SO RESTLESS THAT IT IS HARD TO SIT STILL: NOT AT ALL
4. TROUBLE RELAXING: NEARLY EVERY DAY
3. WORRYING TOO MUCH ABOUT DIFFERENT THINGS: NEARLY EVERY DAY
GAD7 TOTAL SCORE: 15
7. FEELING AFRAID AS IF SOMETHING AWFUL MIGHT HAPPEN: SEVERAL DAYS
1. FEELING NERVOUS, ANXIOUS, OR ON EDGE: NEARLY EVERY DAY

## 2021-05-26 ASSESSMENT — FIBROSIS 4 INDEX: FIB4 SCORE: 0.96

## 2021-05-26 NOTE — PROGRESS NOTES
CC:   Chief Complaint   Patient presents with   • Lab Results   • Dyslipidemia   • Depression   • Anxiety          HISTORY OF PRESENT ILLNESS: Patient is a 55 y.o. female established patient who presents today to follow up on the following conditions:       Health Maintenance: Completed    Prediabetes  Fasting blood sugar on labs from May 6, 2021 within normal limits at 94.    Vitamin D deficiency  This is a  chronic condition.   Supplementation: recommend 4000 IU daily  Last Vitamin D level:   VIT D:   Lab Results   Component Value Date/Time    25HYDROXY 19 (L) 05/06/2021 1000     Patient denies any muscle aches or fatigue.       Dyslipidemia      Discuss medication.   FH high cholesterol. Her diet has not been good- she has been doing "[employer] has already lost 4 pounds.     Anxiety and depression  Depression Screening    Little interest or pleasure in doing things?  2 - more than half the days   Feeling down, depressed , or hopeless? 2 - more than half the days   Trouble falling or staying asleep, or sleeping too much?  3 - nearly every day   Feeling tired or having little energy?  2 - more than half the days   Poor appetite or overeating?  0 - not at all   Feeling bad about yourself - or that you are a failure or have let yourself or your family down? 1 - several days   Trouble concentrating on things, such as reading the newspaper or watching television? 2 - more than half the days   Moving or speaking so slowly that other people could have noticed.  Or the opposite - being so fidgety or restless that you have been moving around a lot more than usual?  0 - not at all   Thoughts that you would be better off dead, or of hurting yourself?  0 - not at all   Patient Health Questionnaire Score: 12       If depressive symptoms identified deferred to follow up visit unless specifically addressed in assesment and plan.    Interpretation of PHQ-9 Total Score   Score Severity   1-4 No Depression   5-9 Mild Depression    10-14 Moderate Depression   15-19 Moderately Severe Depression   20-27 Severe Depression    RAYMOND-7 Questionnaire    Feeling nervous, anxious, or on edge: Nearly every day  Not being able to sop or control worrying: Nearly every day  Worrying too much about different things: Nearly every day  Trouble relaxing: Nearly every day  Being so restless that it's hard to sit still: Not at all  Becoming easily annoyed or irritable: More than half the days  Feeling afraid as if something awful might happen: Several days  Total: 15    Interpretation of RAYMOND 7 Total Score   Score Severity :  0-4 No Anxiety   5-9 Mild Anxiety  10-14 Moderate Anxiety  15-21 Severe Anxiety    Chronic condition, she is finally at a point of being comfortable talking about it.     She has tried online counseling in past. Tried to get in [facility], but could not get into them for 3 months.     Currently on sertraline 50 mg, not therapeutic for her. Has tried prozac in past, but gave flat affect.     Her mother is here today, good support system.       Patient Active Problem List    Diagnosis Date Noted   • Vitamin D deficiency 01/11/2021   • Prediabetes 01/11/2021   • Dyslipidemia 01/11/2021   • Diarrhea of presumed infectious origin 01/11/2021   • LLQ pain 12/01/2019   • Need for vaccination 10/08/2019   • Cervical radiculopathy 10/08/2019   • HSV infection 02/11/2019   • Anxiety and depression 02/11/2019   • Complex tear of medial meniscus of right knee, initial encounter 09/28/2018      Allergies:Codeine    Current Outpatient Medications   Medication Sig Dispense Refill   • rosuvastatin (CRESTOR) 5 MG Tab Take 1 tablet by mouth every evening. 30 tablet 11   • valacyclovir (VALTREX) 1 GM Tab TAKE 0.5 TABS BY MOUTH 2 TIMES A DAY AS NEEDED. 60 tablet 2   • Multiple Vitamin (MULTI-VITAMIN PO) Take  by mouth every day.     • vitamin E (VITAMIN E) 1000 UNIT Cap Take 1 Cap by mouth every day.       No current facility-administered medications for this visit.        Social History     Tobacco Use   • Smoking status: Never Smoker   • Smokeless tobacco: Never Used   Substance Use Topics   • Alcohol use: Yes     Comment: 3 per month   • Drug use: No     Social History     Social History Narrative   • Not on file       Family History   Problem Relation Age of Onset   • Diabetes Sister    • Clotting Disorder Sister         factor V       Review of Systems:    Constitutional: No Fevers, Chills  Eyes: No vision changes  ENT: No hearing changes  Resp: No Shortness of breath  CV: No Chest pain  GI: No Nausea/Vomiting  MSK: No weakness  Skin: No rashes  Neuro: No Headaches  Psych: No Suicidal ideations    All remaining systems reviewed and found to be negative, except as stated above.    Exam:    BP (!) 90/72   Pulse 88   Temp 36.1 °C (97 °F) (Temporal)   Resp 12   Ht 1.524 m (5')   Wt 88.9 kg (196 lb)   SpO2 95%  Body mass index is 38.28 kg/m².    General:  Well nourished, well developed female in NAD  HENT: Atraumatic, normocephalic  EYES: Extraocular movements intact  NECK: Supple, FROM  CHEST: No deformities, Equal chest expansion  RESP: Unlabored, no stridor or audible wheeze  HEART: Regular Rate and rhythm.   Extremities: No Clubbing, Cyanosis, or Edema  Skin: Warm/dry, without rashes  Neuro: A/O x 4, due to COVID-19- did not have patient remove face mask to test cranial nerves.  Motor/sensory grossly intact  Psych: Normal behavior, normal affect      Lab review:  Labs are reviewed and discussed with a patient  Lab Results   Component Value Date/Time    CHOLSTRLTOT 229 (H) 05/06/2021 10:00 AM     (H) 05/06/2021 10:00 AM    HDL 29 (A) 05/06/2021 10:00 AM    TRIGLYCERIDE 206 (H) 05/06/2021 10:00 AM       Lab Results   Component Value Date/Time    SODIUM 140 05/06/2021 10:00 AM    POTASSIUM 4.2 05/06/2021 10:00 AM    CHLORIDE 105 05/06/2021 10:00 AM    CO2 26 05/06/2021 10:00 AM    GLUCOSE 94 05/06/2021 10:00 AM    BUN 12 05/06/2021 10:00 AM    CREATININE 0.73  05/06/2021 10:00 AM    CREATININE 0.8 11/21/2007 03:45 PM     Lab Results   Component Value Date/Time    ALKPHOSPHAT 121 (H) 05/06/2021 10:00 AM    ASTSGOT 36 05/06/2021 10:00 AM    ALTSGPT 41 05/06/2021 10:00 AM    TBILIRUBIN 0.3 05/06/2021 10:00 AM      Lab Results   Component Value Date/Time    HBA1C 5.7 (H) 03/08/2018 08:09 AM    HBA1C 5.7 (H) 03/10/2015 08:46 AM    HBA1C 5.6 04/11/2013 01:49 PM     No results found for: TSH  No results found for: FREET4    Lab Results   Component Value Date/Time    WBC 7.5 05/06/2021 10:00 AM    RBC 4.74 05/06/2021 10:00 AM    HEMOGLOBIN 15.3 05/06/2021 10:00 AM    HEMATOCRIT 45.9 05/06/2021 10:00 AM    MCV 96.8 05/06/2021 10:00 AM    MCH 32.3 05/06/2021 10:00 AM    MCHC 33.3 (L) 05/06/2021 10:00 AM    MPV 9.3 05/06/2021 10:00 AM    NEUTSPOLYS 57.20 05/06/2021 10:00 AM    LYMPHOCYTES 31.90 05/06/2021 10:00 AM    MONOCYTES 5.90 05/06/2021 10:00 AM    EOSINOPHILS 3.80 05/06/2021 10:00 AM    BASOPHILS 0.90 05/06/2021 10:00 AM          Assessment/Plan:  1. Prediabetes  Fasting blood sugar within normal limits on her most recent labs.  2. Vitamin D deficiency  Recommend vitamin D supplementation 4000 IUs a day.  3. Dyslipidemia  At this time patient has made aggressive changes to her diet for weight loss regimen and to try to improve her cholesterol.  She recently started Sissy Jan and has already lost 4 pounds.  I am a big advocate that she continue lifestyle modifications, however I would recommend a trial of a statin therapy at this time to aggressively control her cholesterol.  Recommend trial of Crestor 5 mg once a day to start.  4. Anxiety and depression  - REFERRAL TO PSYCHOLOGY  Chronic condition, uncontrolled at this point.  Recommend we refer her to psychology for CBT, in addition we are going to trial increasing her sertraline to 100 mg once a day.  She has plenty of the 50 mg at home, she will take 2 a day for the next 4 weeks and we will follow-up in 4 weeks on this  condition.    Follow-up in 1 month.    Return to clinic sooner for symptoms including but not limited to: worsening depression, suicidal ideation, anxiety, agitation, panic attacks, insomnia, irritability, hostility, aggressiveness, impulsivity, hypomania and cindy    If such symptoms are observed, you or family need to contact us immediately, and consider calling the National Suicide Prevention Lifeline (1388.207.6515) or 772, or transporting patient to the emergency department for immediate evaluation.     SSRI Black Box Warnings include: Anxiety, agitation, panic attacks, insomnia, irritability, hostility, aggressiveness, impulsivity, hypomania and cindy have been reported in adult and pediatric patients being treated with SSRI for major depressive disorder as well as for other indications.    Other orders  - rosuvastatin (CRESTOR) 5 MG Tab; Take 1 tablet by mouth every evening.  Dispense: 30 tablet; Refill: 11       Follow-up: Return in about 4 weeks (around 6/23/2021) for Follow up.    Please note that this dictation was created using voice recognition software. I have made every reasonable attempt to correct obvious errors, but I expect that there are errors of grammar and possibly content that I did not discover before finalizing the note.

## 2021-05-26 NOTE — ASSESSMENT & PLAN NOTE
Depression Screening    Little interest or pleasure in doing things?  2 - more than half the days   Feeling down, depressed , or hopeless? 2 - more than half the days   Trouble falling or staying asleep, or sleeping too much?  3 - nearly every day   Feeling tired or having little energy?  2 - more than half the days   Poor appetite or overeating?  0 - not at all   Feeling bad about yourself - or that you are a failure or have let yourself or your family down? 1 - several days   Trouble concentrating on things, such as reading the newspaper or watching television? 2 - more than half the days   Moving or speaking so slowly that other people could have noticed.  Or the opposite - being so fidgety or restless that you have been moving around a lot more than usual?  0 - not at all   Thoughts that you would be better off dead, or of hurting yourself?  0 - not at all   Patient Health Questionnaire Score: 12       If depressive symptoms identified deferred to follow up visit unless specifically addressed in assesment and plan.    Interpretation of PHQ-9 Total Score   Score Severity   1-4 No Depression   5-9 Mild Depression   10-14 Moderate Depression   15-19 Moderately Severe Depression   20-27 Severe Depression    RAYMOND-7 Questionnaire    Feeling nervous, anxious, or on edge: Nearly every day  Not being able to sop or control worrying: Nearly every day  Worrying too much about different things: Nearly every day  Trouble relaxing: Nearly every day  Being so restless that it's hard to sit still: Not at all  Becoming easily annoyed or irritable: More than half the days  Feeling afraid as if something awful might happen: Several days  Total: 15    Interpretation of RAYMOND 7 Total Score   Score Severity :  0-4 No Anxiety   5-9 Mild Anxiety  10-14 Moderate Anxiety  15-21 Severe Anxiety    Chronic condition, she is finally at a point of being comfortable talking about it.     She has tried online counseling in past. Tried to get in  [facility], but could not get into them for 3 months.     Currently on sertraline 50 mg, not therapeutic for her. Has tried prozac in past, but gave flat affect.     Her mother is here today, good support system.

## 2021-05-29 ENCOUNTER — OFFICE VISIT (OUTPATIENT)
Dept: URGENT CARE | Facility: PHYSICIAN GROUP | Age: 56
End: 2021-05-29
Payer: COMMERCIAL

## 2021-05-29 VITALS
BODY MASS INDEX: 38.48 KG/M2 | RESPIRATION RATE: 17 BRPM | HEIGHT: 60 IN | TEMPERATURE: 97.6 F | HEART RATE: 84 BPM | WEIGHT: 196 LBS | SYSTOLIC BLOOD PRESSURE: 128 MMHG | OXYGEN SATURATION: 95 % | DIASTOLIC BLOOD PRESSURE: 72 MMHG

## 2021-05-29 DIAGNOSIS — H11.31 SUBCONJUNCTIVAL HEMORRHAGE OF RIGHT EYE: ICD-10-CM

## 2021-05-29 PROCEDURE — 99213 OFFICE O/P EST LOW 20 MIN: CPT | Performed by: NURSE PRACTITIONER

## 2021-05-29 ASSESSMENT — FIBROSIS 4 INDEX: FIB4 SCORE: 0.96

## 2021-05-29 ASSESSMENT — ENCOUNTER SYMPTOMS
CHILLS: 0
MYALGIAS: 0
BLURRED VISION: 0
HEADACHES: 0
COUGH: 0
EYE PAIN: 0
EYE DISCHARGE: 0
SINUS PAIN: 0
FEVER: 0
EYE REDNESS: 1
PHOTOPHOBIA: 0
DIZZINESS: 0
DOUBLE VISION: 0
WEAKNESS: 0
SORE THROAT: 0

## 2021-05-29 NOTE — PROGRESS NOTES
Subjective:      Aida Shields is a 55 y.o. female who presents with Eye Problem (itching, R eye, redness)            HPI  States right eye redness, itchiness, irritation without vision change when she awoke this morning. Has been experiencing itchy eyes and may have inadvertently rubbed her eyes in her sleep. Also, had been up reading late last night. No noted headache or eyeball pressure. No visual field deficit.    PMH:  has a past medical history of Anxiety, Arthritis, Depression, and Pain.  MEDS:   Current Outpatient Medications:   •  rosuvastatin (CRESTOR) 5 MG Tab, Take 1 tablet by mouth every evening., Disp: 30 tablet, Rfl: 11  •  valacyclovir (VALTREX) 1 GM Tab, TAKE 0.5 TABS BY MOUTH 2 TIMES A DAY AS NEEDED., Disp: 60 tablet, Rfl: 2  •  Multiple Vitamin (MULTI-VITAMIN PO), Take  by mouth every day., Disp: , Rfl:   •  vitamin E (VITAMIN E) 1000 UNIT Cap, Take 1 Cap by mouth every day., Disp: , Rfl:   ALLERGIES:   Allergies   Allergen Reactions   • Codeine Vomiting     SURGHX:   Past Surgical History:   Procedure Laterality Date   • KNEE ARTHROSCOPY Right 9/28/2018    Procedure: KNEE ARTHROSCOPY;  Surgeon: Aubrey Noonan M.D.;  Location: [facility];  Service: Orthopedics   • MEDIAL MENISCECTOMY Right 9/28/2018    Procedure: MEDIAL MENISCECTOMY - PARTIAL;  Surgeon: Aubrey Noonan M.D.;  Location: [facility];  Service: Orthopedics   • CHONDROPLASTY Right 9/28/2018    Procedure: CHONDROPLASTY - AND GOMES;  Surgeon: Aubrey Noonan M.D.;  Location: [facility];  Service: Orthopedics   • DENISE BY LAPAROSCOPY  2008   • HAND SURGERY Right 2000    right thumb ligament reattachement   • HYSTERECTOMY, VAGINAL  2000   • TONSILLECTOMY  1966     SOCHX:  reports that she has never smoked. She has never used smokeless tobacco. She reports current alcohol use. She reports that she does not use drugs.  FH: Family history was reviewed, no pertinent findings to report    Review of  Systems   Constitutional: Negative for chills, fever and malaise/fatigue.   HENT: Negative for congestion, ear pain, sinus pain and sore throat.    Eyes: Positive for redness. Negative for blurred vision, double vision, photophobia, pain and discharge.   Respiratory: Negative for cough.    Musculoskeletal: Negative for myalgias.   Skin: Negative for itching and rash.   Neurological: Negative for dizziness, weakness and headaches.   All other systems reviewed and are negative.         Objective:     /72 (BP Location: Right arm, Patient Position: Sitting, BP Cuff Size: Adult)   Pulse 84   Temp 36.4 °C (97.6 °F) (Temporal)   Resp 17   Ht 1.524 m (5')   Wt 88.9 kg (196 lb)   SpO2 95%   BMI 38.28 kg/m²      Physical Exam  Vitals reviewed.   Constitutional:       General: She is awake. She is not in acute distress.     Appearance: Normal appearance. She is well-developed. She is not ill-appearing, toxic-appearing or diaphoretic.   HENT:      Head: Normocephalic.   Eyes:      General: Lids are normal. No allergic shiner or visual field deficit.        Right eye: No foreign body, discharge or hordeolum.      Extraocular Movements: Extraocular movements intact.      Conjunctiva/sclera: Conjunctivae normal.        Comments: Procedure:   Eye stain. Applied one drop Proparacaine to right eye. Apllied Fluorescein stain to right eye. No uptake seen with lamp.        Cardiovascular:      Rate and Rhythm: Normal rate.   Pulmonary:      Effort: Pulmonary effort is normal.   Musculoskeletal:         General: Normal range of motion.      Cervical back: Normal range of motion and neck supple.   Skin:     General: Skin is warm and dry.   Neurological:      Mental Status: She is alert and oriented to person, place, and time.   Psychiatric:         Attention and Perception: Attention and perception normal.         Mood and Affect: Mood and affect normal.         Speech: Speech normal.         Behavior: Behavior normal.  Behavior is cooperative.                        Assessment/Plan:        1. Subconjunctival hemorrhage of right eye    Most likely due to seasonal allergy dry/itchy eyes and long periods of reading that may have caused eye strain  May use longer acting antihistamine x 7 days as needed for allergy symptoms  May use cool compresses for any eye swelling  Avoid touching/rubbing eyes  May use saline eye rinse or eye irrigation solution as needed for eye dryness  Monitor for increase in redness or any swelling, vision change, eye pain- need re-evaluation immediately

## 2021-06-15 NOTE — ASSESSMENT & PLAN NOTE
Patient's last visit we talked about uncontrolled anxiety and depression.  I did refer to psychology for CBT in addition we increased her sertraline to 100 mg daily. She is sleeping better and feeling better. She is still working on CBT.

## 2021-06-16 ENCOUNTER — OFFICE VISIT (OUTPATIENT)
Dept: MEDICAL GROUP | Facility: PHYSICIAN GROUP | Age: 56
End: 2021-06-16
Payer: COMMERCIAL

## 2021-06-16 VITALS
HEART RATE: 75 BPM | SYSTOLIC BLOOD PRESSURE: 100 MMHG | DIASTOLIC BLOOD PRESSURE: 72 MMHG | RESPIRATION RATE: 14 BRPM | BODY MASS INDEX: 37.89 KG/M2 | HEIGHT: 60 IN | TEMPERATURE: 97 F | OXYGEN SATURATION: 96 % | WEIGHT: 193 LBS

## 2021-06-16 DIAGNOSIS — F32.A ANXIETY AND DEPRESSION: ICD-10-CM

## 2021-06-16 DIAGNOSIS — Z79.899 HIGH RISK MEDICATION USE: ICD-10-CM

## 2021-06-16 DIAGNOSIS — F41.9 ANXIETY AND DEPRESSION: ICD-10-CM

## 2021-06-16 DIAGNOSIS — E78.5 DYSLIPIDEMIA: ICD-10-CM

## 2021-06-16 DIAGNOSIS — E55.9 VITAMIN D DEFICIENCY: ICD-10-CM

## 2021-06-16 PROCEDURE — 99214 OFFICE O/P EST MOD 30 MIN: CPT | Performed by: PHYSICIAN ASSISTANT

## 2021-06-16 ASSESSMENT — FIBROSIS 4 INDEX: FIB4 SCORE: 0.96

## 2021-06-16 NOTE — PROGRESS NOTES
CC:   Chief Complaint   Patient presents with   • Medication Follow-up   • Dyslipidemia          HISTORY OF PRESENT ILLNESS: Patient is a 55 y.o. female established patient who presents today to follow up on the following conditions:     Health Maintenance: Completed    Anxiety and depression  Patient's last visit we talked about uncontrolled anxiety and depression.  I did refer to psychology for CBT in addition we increased her sertraline to 100 mg daily. She is sleeping better and feeling better. She is still working on CBT.     Dyslipidemia  At patient's last visit we trialed rosuvastatin 5 mg daily. She is tolerating without side effect. She is down over 8 pounds overall.     Vitamin D deficiency  She is taking 5000 IU daily.       Patient Active Problem List    Diagnosis Date Noted   • Vitamin D deficiency 01/11/2021   • Prediabetes 01/11/2021   • Dyslipidemia 01/11/2021   • Diarrhea of presumed infectious origin 01/11/2021   • LLQ pain 12/01/2019   • Need for vaccination 10/08/2019   • Cervical radiculopathy 10/08/2019   • HSV infection 02/11/2019   • Anxiety and depression 02/11/2019   • Complex tear of medial meniscus of right knee, initial encounter 09/28/2018      Allergies:Codeine    Current Outpatient Medications   Medication Sig Dispense Refill   • rosuvastatin (CRESTOR) 5 MG Tab Take 1 tablet by mouth every evening. 30 tablet 11   • valacyclovir (VALTREX) 1 GM Tab TAKE 0.5 TABS BY MOUTH 2 TIMES A DAY AS NEEDED. 60 tablet 2   • Multiple Vitamin (MULTI-VITAMIN PO) Take  by mouth every day.     • vitamin E (VITAMIN E) 1000 UNIT Cap Take 1 Cap by mouth every day.       No current facility-administered medications for this visit.       Social History     Tobacco Use   • Smoking status: Never Smoker   • Smokeless tobacco: Never Used   Substance Use Topics   • Alcohol use: Yes     Comment: 3 per month   • Drug use: No     Social History     Social History Narrative   • Not on file       Family History    Problem Relation Age of Onset   • Diabetes Sister    • Clotting Disorder Sister         factor V       Review of Systems:    Constitutional: No Fevers, Chills  Eyes: No vision changes  ENT: No hearing changes  Resp: No Shortness of breath  CV: No Chest pain  GI: No Nausea/Vomiting  MSK: No weakness  Skin: No rashes  Neuro: No Headaches  Psych: No Suicidal ideations    All remaining systems reviewed and found to be negative, except as stated above.    Exam:    /72   Pulse 75   Temp 36.1 °C (97 °F) (Temporal)   Resp 14   Ht 1.524 m (5')   Wt 87.5 kg (193 lb)   SpO2 96%  Body mass index is 37.69 kg/m².    General:  Well nourished, well developed female in NAD  HENT: Atraumatic, normocephalic  EYES: Extraocular movements intact  NECK: Supple, FROM  CHEST: No deformities, Equal chest expansion  RESP: Unlabored, no stridor or audible wheeze  HEART: Regular Rate and rhythm.   Extremities: No Clubbing, Cyanosis, or Edema  Skin: Warm/dry, without rashes  Neuro: A/O x 4, due to COVID-19- did not have patient remove face mask to test cranial nerves.  Motor/sensory grossly intact  Psych: Normal behavior, normal affect      Lab review:  Labs are reviewed and discussed with a patient  Lab Results   Component Value Date/Time    CHOLSTRLTOT 229 (H) 05/06/2021 10:00 AM     (H) 05/06/2021 10:00 AM    HDL 29 (A) 05/06/2021 10:00 AM    TRIGLYCERIDE 206 (H) 05/06/2021 10:00 AM       Lab Results   Component Value Date/Time    SODIUM 140 05/06/2021 10:00 AM    POTASSIUM 4.2 05/06/2021 10:00 AM    CHLORIDE 105 05/06/2021 10:00 AM    CO2 26 05/06/2021 10:00 AM    GLUCOSE 94 05/06/2021 10:00 AM    BUN 12 05/06/2021 10:00 AM    CREATININE 0.73 05/06/2021 10:00 AM    CREATININE 0.8 11/21/2007 03:45 PM     Lab Results   Component Value Date/Time    ALKPHOSPHAT 121 (H) 05/06/2021 10:00 AM    ASTSGOT 36 05/06/2021 10:00 AM    ALTSGPT 41 05/06/2021 10:00 AM    TBILIRUBIN 0.3 05/06/2021 10:00 AM      Lab Results   Component  Value Date/Time    HBA1C 5.7 (H) 03/08/2018 08:09 AM    HBA1C 5.7 (H) 03/10/2015 08:46 AM    HBA1C 5.6 04/11/2013 01:49 PM     No results found for: TSH  No results found for: FREET4    Lab Results   Component Value Date/Time    WBC 7.5 05/06/2021 10:00 AM    RBC 4.74 05/06/2021 10:00 AM    HEMOGLOBIN 15.3 05/06/2021 10:00 AM    HEMATOCRIT 45.9 05/06/2021 10:00 AM    MCV 96.8 05/06/2021 10:00 AM    MCH 32.3 05/06/2021 10:00 AM    MCHC 33.3 (L) 05/06/2021 10:00 AM    MPV 9.3 05/06/2021 10:00 AM    NEUTSPOLYS 57.20 05/06/2021 10:00 AM    LYMPHOCYTES 31.90 05/06/2021 10:00 AM    MONOCYTES 5.90 05/06/2021 10:00 AM    EOSINOPHILS 3.80 05/06/2021 10:00 AM    BASOPHILS 0.90 05/06/2021 10:00 AM          Assessment/Plan:  1. Anxiety and depression  Chronic condition, patient feeling substantially better since we increased her sertraline to 100 mg daily.  We will continue for now.  2. Dyslipidemia  - Lipid Profile; Future  Patient tolerating Crestor 5 mg without side effect.  We will continue and repeat labs in 8 weeks.  3. Vitamin D deficiency  - VITAMIN D,25 HYDROXY; Future  Patient feels increased energy since she started supplementing vitamin D 5000 IUs a day.  We will repeat vitamin D level in 8 weeks.  4. High risk medication use  - HEPATIC FUNCTION PANEL; Future  Patient is on statin therapy will repeat LFTs in 8 weeks.    Follow-up: Return in about 8 weeks (around 8/11/2021) for Follow up on labs.    Please note that this dictation was created using voice recognition software. I have made every reasonable attempt to correct obvious errors, but I expect that there are errors of grammar and possibly content that I did not discover before finalizing the note.

## 2021-06-16 NOTE — ASSESSMENT & PLAN NOTE
At patient's last visit we trialed rosuvastatin 5 mg daily. She is tolerating without side effect. She is down over 8 pounds overall.

## 2021-09-01 ENCOUNTER — APPOINTMENT (OUTPATIENT)
Dept: MEDICAL GROUP | Facility: PHYSICIAN GROUP | Age: 56
End: 2021-09-01
Payer: COMMERCIAL

## 2021-09-08 ENCOUNTER — HOSPITAL ENCOUNTER (OUTPATIENT)
Dept: LAB | Facility: MEDICAL CENTER | Age: 56
End: 2021-09-08
Attending: PHYSICIAN ASSISTANT
Payer: COMMERCIAL

## 2021-09-08 DIAGNOSIS — E78.5 DYSLIPIDEMIA: ICD-10-CM

## 2021-09-08 DIAGNOSIS — E55.9 VITAMIN D DEFICIENCY: ICD-10-CM

## 2021-09-08 DIAGNOSIS — Z79.899 HIGH RISK MEDICATION USE: ICD-10-CM

## 2021-09-08 LAB
25(OH)D3 SERPL-MCNC: 35 NG/ML (ref 30–100)
ALBUMIN SERPL BCP-MCNC: 4.2 G/DL (ref 3.2–4.9)
ALP SERPL-CCNC: 106 U/L (ref 30–99)
ALT SERPL-CCNC: 19 U/L (ref 2–50)
AST SERPL-CCNC: 23 U/L (ref 12–45)
BILIRUB CONJ SERPL-MCNC: <0.2 MG/DL (ref 0.1–0.5)
BILIRUB INDIRECT SERPL-MCNC: ABNORMAL MG/DL (ref 0–1)
BILIRUB SERPL-MCNC: 0.2 MG/DL (ref 0.1–1.5)
CHOLEST SERPL-MCNC: 164 MG/DL (ref 100–199)
HDLC SERPL-MCNC: 31 MG/DL
LDLC SERPL CALC-MCNC: 111 MG/DL
PROT SERPL-MCNC: 7 G/DL (ref 6–8.2)
TRIGL SERPL-MCNC: 110 MG/DL (ref 0–149)

## 2021-09-08 PROCEDURE — 82306 VITAMIN D 25 HYDROXY: CPT

## 2021-09-08 PROCEDURE — 36415 COLL VENOUS BLD VENIPUNCTURE: CPT

## 2021-09-08 PROCEDURE — 80076 HEPATIC FUNCTION PANEL: CPT

## 2021-09-08 PROCEDURE — 80061 LIPID PANEL: CPT

## 2021-09-09 ENCOUNTER — OFFICE VISIT (OUTPATIENT)
Dept: MEDICAL GROUP | Facility: PHYSICIAN GROUP | Age: 56
End: 2021-09-09
Payer: COMMERCIAL

## 2021-09-09 VITALS
HEIGHT: 60 IN | DIASTOLIC BLOOD PRESSURE: 74 MMHG | OXYGEN SATURATION: 95 % | SYSTOLIC BLOOD PRESSURE: 102 MMHG | BODY MASS INDEX: 37.89 KG/M2 | TEMPERATURE: 97 F | HEART RATE: 71 BPM | RESPIRATION RATE: 12 BRPM | WEIGHT: 193 LBS

## 2021-09-09 DIAGNOSIS — F32.A ANXIETY AND DEPRESSION: ICD-10-CM

## 2021-09-09 DIAGNOSIS — E55.9 VITAMIN D DEFICIENCY: ICD-10-CM

## 2021-09-09 DIAGNOSIS — F41.9 ANXIETY AND DEPRESSION: ICD-10-CM

## 2021-09-09 DIAGNOSIS — R74.8 ELEVATED ALKALINE PHOSPHATASE LEVEL: ICD-10-CM

## 2021-09-09 DIAGNOSIS — R11.0 NAUSEA: ICD-10-CM

## 2021-09-09 DIAGNOSIS — E78.5 DYSLIPIDEMIA: ICD-10-CM

## 2021-09-09 PROCEDURE — 99214 OFFICE O/P EST MOD 30 MIN: CPT | Performed by: PHYSICIAN ASSISTANT

## 2021-09-09 RX ORDER — ROSUVASTATIN CALCIUM 5 MG/1
5 TABLET, COATED ORAL EVERY EVENING
Qty: 90 TABLET | Refills: 3 | Status: SHIPPED | OUTPATIENT
Start: 2021-09-09 | End: 2022-09-27

## 2021-09-09 RX ORDER — SERTRALINE HYDROCHLORIDE 100 MG/1
100 TABLET, FILM COATED ORAL DAILY
Qty: 90 TABLET | Refills: 3 | Status: SHIPPED | OUTPATIENT
Start: 2021-09-09 | End: 2022-12-28 | Stop reason: SDUPTHER

## 2021-09-09 RX ORDER — SERTRALINE HYDROCHLORIDE 100 MG/1
100 TABLET, FILM COATED ORAL DAILY
COMMUNITY
End: 2021-09-09 | Stop reason: SDUPTHER

## 2021-09-09 ASSESSMENT — FIBROSIS 4 INDEX: FIB4 SCORE: 0.91

## 2021-09-09 NOTE — ASSESSMENT & PLAN NOTE
Some morning nausea started about 3 weeks.  Started at same time with a bout of dizziness at 4 Am that self resolved. Dizziness worse with turning head, room spinning- this resolved after a few minutes. No headache, vision changes or hearing changes. No vomiting, no diarrhea. No abdominal pain. No heart burn symptoms. Nausea better with food. No chest pain or SOB.

## 2021-09-09 NOTE — PROGRESS NOTES
CC:   Chief Complaint   Patient presents with   • Lab Results   • Dyslipidemia          HISTORY OF PRESENT ILLNESS: Patient is a 56 y.o. female established patient who presents today to follow up on the following conditions:       Health Maintenance: Completed    Vitamin D deficiency  Vitamin D improved from 19-35, discussed continue supplementation of 2000 IUs a day.    Elevated alkaline phosphatase level  Alkaline phosphatase improved compared to previous labs.    Dyslipidemia      Greatly improved compared to previous. Doing well on crestor 5 mg.     Nausea  Some morning nausea started about 3 weeks.  Started at same time with a bout of dizziness at 4 Am that self resolved. Dizziness worse with turning head, room spinning- this resolved after a few minutes. No headache, vision changes or hearing changes. No vomiting, no diarrhea. No abdominal pain. No heart burn symptoms. Nausea better with food. No chest pain or SOB.     Anxiety and depression  Chronic condition, feeling good at this time. Sertraline 100 mg.       Patient Active Problem List    Diagnosis Date Noted   • Elevated alkaline phosphatase level 09/09/2021   • Nausea 09/09/2021   • Vitamin D deficiency 01/11/2021   • Prediabetes 01/11/2021   • Dyslipidemia 01/11/2021   • Diarrhea of presumed infectious origin 01/11/2021   • LLQ pain 12/01/2019   • Need for vaccination 10/08/2019   • Cervical radiculopathy 10/08/2019   • HSV infection 02/11/2019   • Anxiety and depression 02/11/2019   • Complex tear of medial meniscus of right knee, initial encounter 09/28/2018      Allergies:Codeine    Current Outpatient Medications   Medication Sig Dispense Refill   • sertraline (ZOLOFT) 100 MG Tab Take 1 Tablet by mouth every day. 90 Tablet 3   • rosuvastatin (CRESTOR) 5 MG Tab Take 1 Tablet by mouth every evening. 90 Tablet 3   • valacyclovir (VALTREX) 1 GM Tab TAKE 0.5 TABS BY MOUTH 2 TIMES A DAY AS NEEDED. 60 tablet 2   • Multiple Vitamin (MULTI-VITAMIN PO) Take   by mouth every day.     • vitamin E (VITAMIN E) 1000 UNIT Cap Take 1 Cap by mouth every day.       No current facility-administered medications for this visit.       Social History     Tobacco Use   • Smoking status: Never Smoker   • Smokeless tobacco: Never Used   Substance Use Topics   • Alcohol use: Yes     Comment: 3 per month   • Drug use: No     Social History     Social History Narrative   • Not on file       Family History   Problem Relation Age of Onset   • Diabetes Sister    • Clotting Disorder Sister         factor V       Review of Systems:    Constitutional: No Fevers, Chills  Eyes: No vision changes  ENT: No hearing changes  Resp: No Shortness of breath  CV: No Chest pain  GI: Some nausea. No vomiting.   MSK: No weakness  Skin: No rashes  Neuro: No Headaches  Psych: No Suicidal ideations    All remaining systems reviewed and found to be negative, except as stated above.    Exam:    /74   Pulse 71   Temp 36.1 °C (97 °F) (Temporal)   Resp 12   Ht 1.524 m (5')   Wt 87.5 kg (193 lb)   SpO2 95%  Body mass index is 37.69 kg/m².    General:  Well nourished, well developed female in NAD  HENT: Atraumatic, normocephalic  EYES: Extraocular movements intact  NECK: Supple, FROM  CHEST: No deformities, Equal chest expansion  RESP: Unlabored, no stridor or audible wheeze  HEART: Regular Rate and rhythm.   Abdomen: nontender, soft.   Extremities: No Clubbing, Cyanosis, or Edema  Skin: Warm/dry, without rashes  Neuro: A/O x 4, due to COVID-19- did not have patient remove face mask to test cranial nerves.  Motor/sensory grossly intact  Psych: Normal behavior, normal affect      Lab review:  Labs are reviewed and discussed with a patient  Lab Results   Component Value Date/Time    CHOLSTRLTOT 164 09/08/2021 08:10 AM     (H) 09/08/2021 08:10 AM    HDL 31 (A) 09/08/2021 08:10 AM    TRIGLYCERIDE 110 09/08/2021 08:10 AM       Lab Results   Component Value Date/Time    SODIUM 140 05/06/2021 10:00 AM     POTASSIUM 4.2 05/06/2021 10:00 AM    CHLORIDE 105 05/06/2021 10:00 AM    CO2 26 05/06/2021 10:00 AM    GLUCOSE 94 05/06/2021 10:00 AM    BUN 12 05/06/2021 10:00 AM    CREATININE 0.73 05/06/2021 10:00 AM    CREATININE 0.8 11/21/2007 03:45 PM     Lab Results   Component Value Date/Time    ALKPHOSPHAT 106 (H) 09/08/2021 08:10 AM    ASTSGOT 23 09/08/2021 08:10 AM    ALTSGPT 19 09/08/2021 08:10 AM    TBILIRUBIN 0.2 09/08/2021 08:10 AM      Lab Results   Component Value Date/Time    HBA1C 5.7 (H) 03/08/2018 08:09 AM    HBA1C 5.7 (H) 03/10/2015 08:46 AM    HBA1C 5.6 04/11/2013 01:49 PM     No results found for: TSH  No results found for: FREET4    Lab Results   Component Value Date/Time    WBC 7.5 05/06/2021 10:00 AM    RBC 4.74 05/06/2021 10:00 AM    HEMOGLOBIN 15.3 05/06/2021 10:00 AM    HEMATOCRIT 45.9 05/06/2021 10:00 AM    MCV 96.8 05/06/2021 10:00 AM    MCH 32.3 05/06/2021 10:00 AM    MCHC 33.3 (L) 05/06/2021 10:00 AM    MPV 9.3 05/06/2021 10:00 AM    NEUTSPOLYS 57.20 05/06/2021 10:00 AM    LYMPHOCYTES 31.90 05/06/2021 10:00 AM    MONOCYTES 5.90 05/06/2021 10:00 AM    EOSINOPHILS 3.80 05/06/2021 10:00 AM    BASOPHILS 0.90 05/06/2021 10:00 AM          Assessment/Plan:  1. Vitamin D deficiency  Improved, continue 2000 IUs daily.  2. Elevated alkaline phosphatase level  - HEPATIC FUNCTION PANEL; Future  Improved alkaline phosphatase, continue lifestyle modifications.  3. Dyslipidemia  - Lipid Profile; Future  Greatly improved cholesterol, will continue Crestor 5 mg for now.  If patient has continued nausea, could be consideration to discontinue the medication make sure it is not a side effect.  4. Nausea  Unsure etiology, at this time patient would just like to monitor her symptoms.  If they change or worsen anyway she will let me know.  5. Anxiety and depression  Chronic condition.  Stable.  Patient continues sertraline 100 mg daily.  Other orders  - sertraline (ZOLOFT) 100 MG Tab; Take 1 Tablet by mouth every day.   Dispense: 90 Tablet; Refill: 3  - rosuvastatin (CRESTOR) 5 MG Tab; Take 1 Tablet by mouth every evening.  Dispense: 90 Tablet; Refill: 3       Follow-up: Return in about 3 months (around 12/9/2021) for Follow up on labs.    Please note that this dictation was created using voice recognition software. I have made every reasonable attempt to correct obvious errors, but I expect that there are errors of grammar and possibly content that I did not discover before finalizing the note.

## 2021-09-15 ENCOUNTER — HOSPITAL ENCOUNTER (OUTPATIENT)
Dept: RADIOLOGY | Facility: MEDICAL CENTER | Age: 56
End: 2021-09-15
Attending: PHYSICIAN ASSISTANT
Payer: COMMERCIAL

## 2021-09-15 DIAGNOSIS — Z12.31 ENCOUNTER FOR SCREENING MAMMOGRAM FOR BREAST CANCER: ICD-10-CM

## 2021-09-15 PROCEDURE — 77063 BREAST TOMOSYNTHESIS BI: CPT

## 2021-09-16 DIAGNOSIS — B00.9 HSV INFECTION: ICD-10-CM

## 2021-09-16 RX ORDER — VALACYCLOVIR HYDROCHLORIDE 1 G/1
500 TABLET, FILM COATED ORAL 2 TIMES DAILY PRN
Qty: 90 TABLET | Refills: 1 | Status: SHIPPED | OUTPATIENT
Start: 2021-09-16

## 2022-01-01 ENCOUNTER — HOSPITAL ENCOUNTER (OUTPATIENT)
Dept: RADIOLOGY | Facility: MEDICAL CENTER | Age: 57
End: 2022-01-01
Attending: EMERGENCY MEDICINE
Payer: COMMERCIAL

## 2022-01-01 ENCOUNTER — OFFICE VISIT (OUTPATIENT)
Dept: URGENT CARE | Facility: PHYSICIAN GROUP | Age: 57
End: 2022-01-01
Payer: COMMERCIAL

## 2022-01-01 VITALS
HEART RATE: 95 BPM | BODY MASS INDEX: 35.34 KG/M2 | WEIGHT: 180 LBS | DIASTOLIC BLOOD PRESSURE: 82 MMHG | SYSTOLIC BLOOD PRESSURE: 122 MMHG | TEMPERATURE: 98.1 F | OXYGEN SATURATION: 95 % | HEIGHT: 60 IN | RESPIRATION RATE: 24 BRPM

## 2022-01-01 DIAGNOSIS — R05.9 COUGH: ICD-10-CM

## 2022-01-01 DIAGNOSIS — J02.9 SORE THROAT: ICD-10-CM

## 2022-01-01 DIAGNOSIS — U07.1 COVID-19 VIRUS INFECTION: ICD-10-CM

## 2022-01-01 DIAGNOSIS — J01.90 ACUTE RHINOSINUSITIS: ICD-10-CM

## 2022-01-01 LAB
EXTERNAL QUALITY CONTROL: NORMAL
INT CON NEG: NEGATIVE
INT CON POS: POSITIVE
S PYO AG THROAT QL: NEGATIVE
SARS-COV+SARS-COV-2 AG RESP QL IA.RAPID: POSITIVE

## 2022-01-01 PROCEDURE — 87880 STREP A ASSAY W/OPTIC: CPT | Performed by: EMERGENCY MEDICINE

## 2022-01-01 PROCEDURE — 99213 OFFICE O/P EST LOW 20 MIN: CPT | Mod: CS | Performed by: EMERGENCY MEDICINE

## 2022-01-01 PROCEDURE — 71046 X-RAY EXAM CHEST 2 VIEWS: CPT

## 2022-01-01 PROCEDURE — 87426 SARSCOV CORONAVIRUS AG IA: CPT | Performed by: EMERGENCY MEDICINE

## 2022-01-01 RX ORDER — DOXYCYCLINE HYCLATE 100 MG
100 TABLET ORAL 2 TIMES DAILY
Qty: 10 TABLET | Refills: 0 | Status: SHIPPED | OUTPATIENT
Start: 2022-01-01 | End: 2022-01-06

## 2022-01-01 ASSESSMENT — ENCOUNTER SYMPTOMS
VOMITING: 0
DIARRHEA: 0
NAUSEA: 0
WHEEZING: 0
SORE THROAT: 1
MYALGIAS: 1
RHINORRHEA: 1
SPUTUM PRODUCTION: 1
SINUS PAIN: 1
SWOLLEN GLANDS: 0
FEVER: 1
SHORTNESS OF BREATH: 0
HEADACHES: 1
CHILLS: 1
COUGH: 1

## 2022-01-01 ASSESSMENT — FIBROSIS 4 INDEX: FIB4 SCORE: 0.91

## 2022-01-01 NOTE — PROGRESS NOTES
Subjective     Aida Shields is a 56 y.o. female who presents with Cough (wet; productive- onset 1x wk), Fever (101.5; last night), Ear Pain, Headache, and Pharyngitis            URI   This is a new problem. Episode onset: 6 days. The problem has been gradually worsening. The maximum temperature recorded prior to her arrival was 101 - 101.9 F. Associated symptoms include chest pain, congestion, coughing, ear pain, headaches, rhinorrhea, sinus pain and a sore throat. Pertinent negatives include no diarrhea, nausea, plugged ear sensation, rash, swollen glands, vomiting or wheezing.   COVID-19 vaccinated    Review of Systems   Constitutional: Positive for chills, fever and malaise/fatigue.   HENT: Positive for congestion, ear pain, rhinorrhea, sinus pain and sore throat. Negative for nosebleeds.    Respiratory: Positive for cough and sputum production. Negative for shortness of breath and wheezing.    Cardiovascular: Positive for chest pain.   Gastrointestinal: Negative for diarrhea, nausea and vomiting.   Musculoskeletal: Positive for myalgias.   Skin: Negative for rash.   Neurological: Positive for headaches.   Endo/Heme/Allergies: Positive for environmental allergies.              Objective     /82 (BP Location: Right arm, Patient Position: Sitting, BP Cuff Size: Adult)   Pulse 95   Temp 36.7 °C (98.1 °F) (Temporal)   Resp (!) 24   Ht 1.524 m (5')   Wt 81.6 kg (180 lb)   SpO2 95%   BMI 35.15 kg/m²      Physical Exam  Constitutional:       General: She is not in acute distress.     Appearance: She is well-developed. She is not toxic-appearing.   HENT:      Head: Normocephalic.      Right Ear: Tympanic membrane and ear canal normal.      Left Ear: Tympanic membrane and ear canal normal.      Nose: Mucosal edema present. No rhinorrhea.      Mouth/Throat:      Pharynx: Posterior oropharyngeal erythema present. No oropharyngeal exudate or uvula swelling.      Tonsils: No tonsillar exudate.   Eyes:       Conjunctiva/sclera: Conjunctivae normal.   Neck:      Trachea: Trachea normal.   Cardiovascular:      Rate and Rhythm: Normal rate and regular rhythm.      Heart sounds: Normal heart sounds.   Pulmonary:      Effort: Tachypnea present. No accessory muscle usage, prolonged expiration or respiratory distress.      Breath sounds: Rhonchi present. No wheezing or rales.   Musculoskeletal:      Cervical back: Neck supple.      Right lower leg: No edema.      Left lower leg: No edema.   Lymphadenopathy:      Cervical: No cervical adenopathy.   Skin:     General: Skin is warm and dry.   Neurological:      Mental Status: She is alert.   Psychiatric:         Behavior: Behavior is cooperative.                             Assessment & Plan        1. COVID-19 virus infection  Recommended supportive care measures, including rest, increasing oral fluid intake and use of over-the-counter medications for relief of symptoms.  ED if worsening SOB  2. Cough  - DX-CHEST-2 VIEWS; Interpretation per radiologist:   No radiographic evidence of acute cardiopulmonary process.  positive- POCT SARS-COV Antigen KARMA (Symptomatic Only)  3. Sore throat  negative- POCT Rapid Strep A

## 2022-02-09 ENCOUNTER — OFFICE VISIT (OUTPATIENT)
Dept: MEDICAL GROUP | Facility: PHYSICIAN GROUP | Age: 57
End: 2022-02-09
Payer: COMMERCIAL

## 2022-02-09 VITALS
TEMPERATURE: 99.6 F | OXYGEN SATURATION: 95 % | BODY MASS INDEX: 39.46 KG/M2 | HEART RATE: 80 BPM | HEIGHT: 60 IN | WEIGHT: 201 LBS | DIASTOLIC BLOOD PRESSURE: 82 MMHG | SYSTOLIC BLOOD PRESSURE: 120 MMHG | RESPIRATION RATE: 16 BRPM

## 2022-02-09 DIAGNOSIS — M54.6 CHRONIC BILATERAL THORACIC BACK PAIN: ICD-10-CM

## 2022-02-09 DIAGNOSIS — G89.29 CHRONIC BILATERAL THORACIC BACK PAIN: ICD-10-CM

## 2022-02-09 PROBLEM — M54.9 CHRONIC BILATERAL BACK PAIN: Status: ACTIVE | Noted: 2022-02-09

## 2022-02-09 PROBLEM — R10.32 LLQ PAIN: Status: RESOLVED | Noted: 2019-12-01 | Resolved: 2022-02-09

## 2022-02-09 PROBLEM — S83.231A COMPLEX TEAR OF MEDIAL MENISCUS OF RIGHT KNEE, INITIAL ENCOUNTER: Status: RESOLVED | Noted: 2018-09-28 | Resolved: 2022-02-09

## 2022-02-09 PROBLEM — Z23 NEED FOR VACCINATION: Status: RESOLVED | Noted: 2019-10-08 | Resolved: 2022-02-09

## 2022-02-09 PROBLEM — R19.7 DIARRHEA OF PRESUMED INFECTIOUS ORIGIN: Status: RESOLVED | Noted: 2021-01-11 | Resolved: 2022-02-09

## 2022-02-09 PROCEDURE — 99214 OFFICE O/P EST MOD 30 MIN: CPT | Performed by: NURSE PRACTITIONER

## 2022-02-09 RX ORDER — GINSENG 100 MG
CAPSULE ORAL DAILY
COMMUNITY
End: 2023-10-22

## 2022-02-09 RX ORDER — TIZANIDINE HYDROCHLORIDE 2 MG/1
4 CAPSULE, GELATIN COATED ORAL 3 TIMES DAILY PRN
Qty: 30 CAPSULE | Refills: 2 | Status: SHIPPED | OUTPATIENT
Start: 2022-02-09 | End: 2023-05-24

## 2022-02-09 ASSESSMENT — FIBROSIS 4 INDEX: FIB4 SCORE: 0.91

## 2022-02-09 NOTE — ASSESSMENT & PLAN NOTE
The back pain is ongoing for 6 months. The pain is constant. The area is in the middle of her back below her bra strap. She is taking Advil with relief. She has not been to physical therapy. She has noticed swelling and using Voltaren with improvement. 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 She wears good supportive shoes, Dansko and Gut[employer]rez. Denies falls, trauma, bruising, twisting injury, shooting pain, burning or tingling, saddle anesthesia, bowel or bladder incontinence, fever or chills. She was positive with COVID on 12/23/2022.

## 2022-02-09 NOTE — PROGRESS NOTES
Subjective:     CC: back pain, establishing care    HPI:   Toya is an established patient of Malissa Tam P.[facility] who presents today with her mother Pernell for the following:     Chronic bilateral back pain  The back pain is ongoing for 6 months. The pain is constant. The area is in the middle of her back below her bra strap. She is taking Advil with relief. She has not been to physical therapy. She has noticed swelling and using Voltaren with improvement. 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 She wears good supportive shoes, [employer] and [employer]. Denies falls, trauma, bruising, twisting injury, shooting pain, burning or tingling, saddle anesthesia, bowel or bladder incontinence, fever or chills. She was positive with COVID on 12/23/2022.      Past Medical History:   Diagnosis Date   • Anxiety    • Arthritis     knee   • Complex tear of medial meniscus of right knee, initial encounter 9/28/2018   • Depression    • Pain     Right knee       Social History     Tobacco Use   • Smoking status: Never Smoker   • Smokeless tobacco: Never Used   Vaping Use   • Vaping Use: Never used   Substance Use Topics   • Alcohol use: Yes     Comment: 3 per month   • Drug use: No       Current Outpatient Medications Ordered in Epic   Medication Sig Dispense Refill   • Zinc 50 MG Tab Take  by mouth.     • VITAMIN D PO Take  by mouth.     • Multiple Vitamins-Minerals (MULTIVITAMIN GUMMIES WOMENS PO) Take  by mouth.     • tizanidine (ZANAFLEX) 2 MG capsule Take 2 Capsules by mouth 3 times a day as needed (muscle spasm). 30 Capsule 2   • valacyclovir (VALTREX) 1 GM Tab TAKE 0.5 TABS BY MOUTH 2 TIMES A DAY AS NEEDED. 90 Tablet 1   • sertraline (ZOLOFT) 100 MG Tab Take 1 Tablet by mouth every day. 90 Tablet 3   • rosuvastatin (CRESTOR) 5 MG Tab Take 1 Tablet by mouth every evening. 90 Tablet 3   • Multiple Vitamin (MULTI-VITAMIN PO) Take  by mouth every day.       No current Epic-ordered  facility-administered medications on file.       Allergies:  Codeine    Health Maintenance: due for COVID booster    Objective:     Vital signs reviewed   Exam:  /82 (BP Location: Left arm, Patient Position: Sitting, BP Cuff Size: Adult)   Pulse 80   Temp 37.6 °C (99.6 °F) (Temporal)   Resp 16   Ht 1.524 m (5')   Wt 91.2 kg (201 lb)   SpO2 95%   BMI 39.26 kg/m²  Body mass index is 39.26 kg/m².    Gen: Alert and oriented, No apparent distress. Mother present in exam room.   Eyes:   Lids normal. Glasses in place.   Lungs: Normal effort, CTA bilaterally, no wheezes, rhonchi, or rales.  CV: Regular rate and rhythm. No murmurs, rubs, or gallops.  Ext: No clubbing, cyanosis, edema. Tender on palpation midline at bra level without bruising, swelling, erythema, or obvious deformity.       Assessment & Plan:     56 y.o. female with the following -     1. Chronic bilateral thoracic back pain  Chronic exacerbated problem. Start physical therapy. Start tizanidine PRN. 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 Discussed stretching exercises.   - Referral to Physical Therapy  - tizanidine (ZANAFLEX) 2 MG capsule; Take 2 Capsules by mouth 3 times a day as needed (muscle spasm).  Dispense: 30 Capsule; Refill: 2      Return for needs reschedule establishing appointment.    Educated in proper administration of medication(s) ordered today including safety, possible SE, risks, benefits, rationale and alternatives to therapy.     Please note that this dictation was created using voice recognition software. I have made every reasonable attempt to correct obvious errors, but I expect that there are errors of grammar and possibly content that I did not discover before finalizing the note.

## 2022-03-23 ENCOUNTER — OFFICE VISIT (OUTPATIENT)
Dept: MEDICAL GROUP | Facility: PHYSICIAN GROUP | Age: 57
End: 2022-03-23
Payer: COMMERCIAL

## 2022-03-23 VITALS
DIASTOLIC BLOOD PRESSURE: 74 MMHG | HEART RATE: 73 BPM | BODY MASS INDEX: 39.66 KG/M2 | TEMPERATURE: 98.1 F | SYSTOLIC BLOOD PRESSURE: 116 MMHG | WEIGHT: 202 LBS | OXYGEN SATURATION: 95 % | HEIGHT: 60 IN

## 2022-03-23 DIAGNOSIS — F32.A ANXIETY AND DEPRESSION: ICD-10-CM

## 2022-03-23 DIAGNOSIS — R73.03 PREDIABETES: ICD-10-CM

## 2022-03-23 DIAGNOSIS — G89.29 CHRONIC BILATERAL THORACIC BACK PAIN: ICD-10-CM

## 2022-03-23 DIAGNOSIS — B00.9 HSV INFECTION: ICD-10-CM

## 2022-03-23 DIAGNOSIS — Z76.89 ENCOUNTER TO ESTABLISH CARE: ICD-10-CM

## 2022-03-23 DIAGNOSIS — E55.9 VITAMIN D DEFICIENCY: ICD-10-CM

## 2022-03-23 DIAGNOSIS — R74.8 ELEVATED ALKALINE PHOSPHATASE LEVEL: ICD-10-CM

## 2022-03-23 DIAGNOSIS — M54.6 CHRONIC BILATERAL THORACIC BACK PAIN: ICD-10-CM

## 2022-03-23 DIAGNOSIS — E78.5 DYSLIPIDEMIA: ICD-10-CM

## 2022-03-23 DIAGNOSIS — F41.9 ANXIETY AND DEPRESSION: ICD-10-CM

## 2022-03-23 PROBLEM — R73.01 ELEVATED FASTING GLUCOSE: Status: ACTIVE | Noted: 2022-03-23

## 2022-03-23 PROBLEM — R11.0 NAUSEA: Status: RESOLVED | Noted: 2021-09-09 | Resolved: 2022-03-23

## 2022-03-23 PROBLEM — R73.01 ELEVATED FASTING GLUCOSE: Status: RESOLVED | Noted: 2022-03-23 | Resolved: 2022-03-23

## 2022-03-23 PROCEDURE — 99214 OFFICE O/P EST MOD 30 MIN: CPT | Performed by: NURSE PRACTITIONER

## 2022-03-23 ASSESSMENT — PATIENT HEALTH QUESTIONNAIRE - PHQ9
SUM OF ALL RESPONSES TO PHQ QUESTIONS 1-9: 6
5. POOR APPETITE OR OVEREATING: 0 - NOT AT ALL
CLINICAL INTERPRETATION OF PHQ2 SCORE: 2

## 2022-03-23 ASSESSMENT — FIBROSIS 4 INDEX: FIB4 SCORE: 0.91

## 2022-03-23 NOTE — ASSESSMENT & PLAN NOTE
She is taking sertraline 100 mg daily. She reports that she was referred in the past by her prior PCP but was unable to get an appointment scheduled. She has done online talk therapy that has worked.  Her anxiety has improved.  She reports that her depression does not seem to be improving, but is interested in referral today.  She denies any self-harm or SI today.

## 2022-03-23 NOTE — PROGRESS NOTES
Subjective:     CC:  Diagnoses of Encounter to establish care, Dyslipidemia, Chronic bilateral thoracic back pain, Anxiety and depression, Prediabetes, HSV infection, Elevated alkaline phosphatase level, and Vitamin D deficiency were pertinent to this visit.    HISTORY OF THE PRESENT ILLNESS: Patient is a 56 y.o. female. This pleasant patient is here today to establish care and discuss the following. Her prior PCP was CRISTIANO Hurtado.    Chronic bilateral back pain  She reports that the back pain it is improving.  She is using zanaflex at bedtime that does help. She is raising the work table with IV starts and practicing good body mechanics at work.    Anxiety and depression  She is taking sertraline 100 mg daily. She reports that she was referred in the past by her prior PCP but was unable to get an appointment scheduled. She has done online talk therapy that has worked.  Her anxiety has improved.  She reports that her depression does not seem to be improving, but is interested in referral today.  She denies any self-harm or SI today.    HSV infection  Her last flare was 1 month ago. Stress can flare. She is taking valacyclovir as needed.    Dyslipidemia  She is taking rosuvastatin 5 mg every evening.  She is due for updated labs.    Elevated alkaline phosphatase level  She is due for updated labs.  She denies any abdominal pain, nausea, vomiting or jaundice.    Prediabetes  She is due for updated labs.    Vitamin D deficiency  She is taking daily vitamin D supplementation.  She is due for updated labs.      Allergies: Codeine    Current Outpatient Medications Ordered in Epic   Medication Sig Dispense Refill   • Zinc 50 MG Tab Take  by mouth.     • VITAMIN D PO Take  by mouth.     • Multiple Vitamins-Minerals (MULTIVITAMIN GUMMIES WOMENS PO) Take  by mouth.     • tizanidine (ZANAFLEX) 2 MG capsule Take 2 Capsules by mouth 3 times a day as needed (muscle spasm). 30 Capsule 2   • valacyclovir (VALTREX) 1 GM Tab  TAKE 0.5 TABS BY MOUTH 2 TIMES A DAY AS NEEDED. 90 Tablet 1   • sertraline (ZOLOFT) 100 MG Tab Take 1 Tablet by mouth every day. 90 Tablet 3   • rosuvastatin (CRESTOR) 5 MG Tab Take 1 Tablet by mouth every evening. 90 Tablet 3     No current Epic-ordered facility-administered medications on file.       Past Medical History:   Diagnosis Date   • Anxiety    • Arthritis     knee   • Complex tear of medial meniscus of right knee, initial encounter 9/28/2018   • Depression    • Pain     Right knee       Past Surgical History:   Procedure Laterality Date   • KNEE ARTHROSCOPY Right 9/28/2018    Procedure: KNEE ARTHROSCOPY;  Surgeon: Aubrey Noonan M.D.;  Location: SURGERY [facility];  Service: Orthopedics   • MENISCECTOMY, KNEE, MEDIAL Right 9/28/2018    Procedure: MEDIAL MENISCECTOMY - PARTIAL;  Surgeon: Aubrey Noonan M.D.;  Location: SURGERY [facility];  Service: Orthopedics   • CHONDROPLASTY Right 9/28/2018    Procedure: CHONDROPLASTY - AND GOMES;  Surgeon: Aubrey Noonan M.D.;  Location: SURGERY [facility];  Service: Orthopedics   • DENISE BY LAPAROSCOPY  2008   • HAND SURGERY Right 2000    right thumb ligament reattachement   • HYSTERECTOMY, VAGINAL  2000   • TONSILLECTOMY  1966       Social History     Tobacco Use   • Smoking status: Never Smoker   • Smokeless tobacco: Never Used   Vaping Use   • Vaping Use: Never used   Substance Use Topics   • Alcohol use: Yes     Alcohol/week: 0.6 oz     Types: 1 Glasses of wine per week   • Drug use: No       Social History     Social History Narrative   • Not on file       Family History   Problem Relation Age of Onset   • Clotting Disorder Sister         factor V   • Heart Disease Sister         now prediabetic   • Thyroid Sister    • Other Sister         parathyroidectomy    • Kidney stones Sister    • Hypertension Mother    • Hypertension Father    • Cancer Father         prostate   • Kidney stones Father    • Diabetes Maternal Grandmother    • Cancer  Maternal Grandmother    • Alcohol abuse Maternal Grandfather    • Diabetes Maternal Grandfather    • Other Maternal Grandfather         liver disease       Health Maintenance: Due for COVID booster.         Objective:     Vital signs reviewed  Exam: /74 (BP Location: Right arm, Patient Position: Sitting, BP Cuff Size: Adult)   Pulse 73   Temp 36.7 °C (98.1 °F) (Temporal)   Ht 1.524 m (5')   Wt 91.6 kg (202 lb)   SpO2 95%  Body mass index is 39.45 kg/m².    Gen: Alert and oriented, No apparent distress.  Eyes:   Lids normal. Glasses in place.   Lungs: Normal effort, CTA bilaterally, no wheezes, rhonchi, or rales.    CV: Regular rate and rhythm. No murmurs, rubs, or gallops.  Ext: No clubbing, cyanosis, edema      Assessment & Plan:   56 y.o. female with the following -    1. Encounter to establish care  Acute uncomplicated problem.  Care established today.    2. Dyslipidemia  Chronic stable problem.  She will continue with her rosuvastatin 5 mg every evening.  She is due for updated labs.  She is comfortable with her [employer] we discussed following with results via [employer].  - CBC WITH DIFFERENTIAL; Future  - Comp Metabolic Panel; Future  - Lipid Profile; Future    3. Chronic bilateral thoracic back pain  Chronic stable problem.  Continue with good body mechanics while at work.  Continue with tizanidine as needed.    4. Anxiety and depression  Chronic stable problem.  She will continue with her sertraline 100 mg daily.  Denies any self-harm or SI today.  We discussed a referral to psychology and she is in agreement.  - Referral to Psychology    5. Prediabetes  Chronic stable problem.  Discussed diet and lifestyle modifications.  Due for updated labs.  - HEMOGLOBIN A1C; Future    6. HSV infection  Chronic stable problem.  Continue valacyclovir as needed with flares.  She does not need a medication refill today.    7. Elevated alkaline phosphatase level  Chronic stable problem.  Due for updated labs.    8.  Vitamin D deficiency  Chronic stable problem.  She will continue with her vitamin D supplementation.  Due for updated labs.  - VITAMIN D,25 HYDROXY; Future        Return in about 6 months (around 9/23/2022) for anxiety and depression .    Please note that this dictation was created using voice recognition software. I have made every reasonable attempt to correct obvious errors, but I expect that there are errors of grammar and possibly content that I did not discover before finalizing the note.

## 2022-09-23 ENCOUNTER — HOSPITAL ENCOUNTER (OUTPATIENT)
Dept: LAB | Facility: MEDICAL CENTER | Age: 57
End: 2022-09-23
Attending: NURSE PRACTITIONER
Payer: COMMERCIAL

## 2022-09-23 DIAGNOSIS — E78.5 DYSLIPIDEMIA: ICD-10-CM

## 2022-09-23 DIAGNOSIS — R73.03 PREDIABETES: ICD-10-CM

## 2022-09-23 DIAGNOSIS — E55.9 VITAMIN D DEFICIENCY: ICD-10-CM

## 2022-09-23 LAB
25(OH)D3 SERPL-MCNC: 24 NG/ML (ref 30–100)
ALBUMIN SERPL BCP-MCNC: 4.4 G/DL (ref 3.2–4.9)
ALBUMIN/GLOB SERPL: 1.6 G/DL
ALP SERPL-CCNC: 112 U/L (ref 30–99)
ALT SERPL-CCNC: 25 U/L (ref 2–50)
ANION GAP SERPL CALC-SCNC: 11 MMOL/L (ref 7–16)
AST SERPL-CCNC: 30 U/L (ref 12–45)
BASOPHILS # BLD AUTO: 0.5 % (ref 0–1.8)
BASOPHILS # BLD: 0.04 K/UL (ref 0–0.12)
BILIRUB SERPL-MCNC: 0.6 MG/DL (ref 0.1–1.5)
BUN SERPL-MCNC: 13 MG/DL (ref 8–22)
CALCIUM SERPL-MCNC: 9.3 MG/DL (ref 8.5–10.5)
CHLORIDE SERPL-SCNC: 103 MMOL/L (ref 96–112)
CHOLEST SERPL-MCNC: 175 MG/DL (ref 100–199)
CO2 SERPL-SCNC: 25 MMOL/L (ref 20–33)
CREAT SERPL-MCNC: 0.85 MG/DL (ref 0.5–1.4)
EOSINOPHIL # BLD AUTO: 0.35 K/UL (ref 0–0.51)
EOSINOPHIL NFR BLD: 4.7 % (ref 0–6.9)
ERYTHROCYTE [DISTWIDTH] IN BLOOD BY AUTOMATED COUNT: 44.7 FL (ref 35.9–50)
EST. AVERAGE GLUCOSE BLD GHB EST-MCNC: 126 MG/DL
FASTING STATUS PATIENT QL REPORTED: NORMAL
GFR SERPLBLD CREATININE-BSD FMLA CKD-EPI: 79 ML/MIN/1.73 M 2
GLOBULIN SER CALC-MCNC: 2.7 G/DL (ref 1.9–3.5)
GLUCOSE SERPL-MCNC: 95 MG/DL (ref 65–99)
HBA1C MFR BLD: 6 % (ref 4–5.6)
HCT VFR BLD AUTO: 46 % (ref 37–47)
HDLC SERPL-MCNC: 32 MG/DL
HGB BLD-MCNC: 15.3 G/DL (ref 12–16)
IMM GRANULOCYTES # BLD AUTO: 0.02 K/UL (ref 0–0.11)
IMM GRANULOCYTES NFR BLD AUTO: 0.3 % (ref 0–0.9)
LDLC SERPL CALC-MCNC: 118 MG/DL
LYMPHOCYTES # BLD AUTO: 1.65 K/UL (ref 1–4.8)
LYMPHOCYTES NFR BLD: 22.3 % (ref 22–41)
MCH RBC QN AUTO: 31.5 PG (ref 27–33)
MCHC RBC AUTO-ENTMCNC: 33.3 G/DL (ref 33.6–35)
MCV RBC AUTO: 94.7 FL (ref 81.4–97.8)
MONOCYTES # BLD AUTO: 0.61 K/UL (ref 0–0.85)
MONOCYTES NFR BLD AUTO: 8.2 % (ref 0–13.4)
NEUTROPHILS # BLD AUTO: 4.73 K/UL (ref 2–7.15)
NEUTROPHILS NFR BLD: 64 % (ref 44–72)
NRBC # BLD AUTO: 0 K/UL
NRBC BLD-RTO: 0 /100 WBC
PLATELET # BLD AUTO: 292 K/UL (ref 164–446)
PMV BLD AUTO: 9.1 FL (ref 9–12.9)
POTASSIUM SERPL-SCNC: 4 MMOL/L (ref 3.6–5.5)
PROT SERPL-MCNC: 7.1 G/DL (ref 6–8.2)
RBC # BLD AUTO: 4.86 M/UL (ref 4.2–5.4)
SODIUM SERPL-SCNC: 139 MMOL/L (ref 135–145)
TRIGL SERPL-MCNC: 123 MG/DL (ref 0–149)
WBC # BLD AUTO: 7.4 K/UL (ref 4.8–10.8)

## 2022-09-23 PROCEDURE — 80053 COMPREHEN METABOLIC PANEL: CPT

## 2022-09-23 PROCEDURE — 80061 LIPID PANEL: CPT

## 2022-09-23 PROCEDURE — 36415 COLL VENOUS BLD VENIPUNCTURE: CPT

## 2022-09-23 PROCEDURE — 83036 HEMOGLOBIN GLYCOSYLATED A1C: CPT

## 2022-09-23 PROCEDURE — 82306 VITAMIN D 25 HYDROXY: CPT

## 2022-09-23 PROCEDURE — 85025 COMPLETE CBC W/AUTO DIFF WBC: CPT

## 2022-09-27 ENCOUNTER — OFFICE VISIT (OUTPATIENT)
Dept: MEDICAL GROUP | Facility: PHYSICIAN GROUP | Age: 57
End: 2022-09-27
Payer: COMMERCIAL

## 2022-09-27 VITALS
WEIGHT: 203 LBS | TEMPERATURE: 98.9 F | HEIGHT: 60 IN | BODY MASS INDEX: 39.85 KG/M2 | OXYGEN SATURATION: 98 % | SYSTOLIC BLOOD PRESSURE: 104 MMHG | DIASTOLIC BLOOD PRESSURE: 68 MMHG | HEART RATE: 65 BPM

## 2022-09-27 DIAGNOSIS — E55.9 VITAMIN D DEFICIENCY: ICD-10-CM

## 2022-09-27 DIAGNOSIS — R74.8 ELEVATED ALKALINE PHOSPHATASE LEVEL: ICD-10-CM

## 2022-09-27 DIAGNOSIS — R10.10 UPPER ABDOMINAL PAIN: ICD-10-CM

## 2022-09-27 DIAGNOSIS — Z23 FLU VACCINE NEED: ICD-10-CM

## 2022-09-27 DIAGNOSIS — E78.5 DYSLIPIDEMIA: ICD-10-CM

## 2022-09-27 DIAGNOSIS — Z12.31 ENCOUNTER FOR SCREENING MAMMOGRAM FOR BREAST CANCER: ICD-10-CM

## 2022-09-27 DIAGNOSIS — R73.03 PREDIABETES: ICD-10-CM

## 2022-09-27 PROCEDURE — 99214 OFFICE O/P EST MOD 30 MIN: CPT | Mod: 25 | Performed by: NURSE PRACTITIONER

## 2022-09-27 PROCEDURE — 90471 IMMUNIZATION ADMIN: CPT | Performed by: NURSE PRACTITIONER

## 2022-09-27 PROCEDURE — 90686 IIV4 VACC NO PRSV 0.5 ML IM: CPT | Performed by: NURSE PRACTITIONER

## 2022-09-27 RX ORDER — ROSUVASTATIN CALCIUM 10 MG/1
10 TABLET, COATED ORAL EVERY EVENING
Qty: 90 TABLET | Refills: 1 | Status: SHIPPED | OUTPATIENT
Start: 2022-09-27 | End: 2023-03-30

## 2022-09-27 ASSESSMENT — FIBROSIS 4 INDEX: FIB4 SCORE: 1.19

## 2022-09-27 NOTE — PROGRESS NOTES
"Subjective:     CC: Abdominal cramps    HPI:   Toya presents today with her mother for the following:    Abdominal cramps  Reports diagnosis of hypoglycemia in high school. She does not check her blood sugars. She reports that she was not watching her diet for the last 2 months, eating more sweets. On Sunday back to eating regular diet of protein, fruit, and healthy choice frozen meal for dinner. The cramps were in the upper abdomen and was sore touch. Not sore today. No cramps since yesterday. The cramps were intermittent and at various times of the day, felt like she was being \"stabbed\". Some nausea and diarrhea.  Diarrhea is resolving. Denies fever, chills, hematuria, bloody stools, night sweats, unexplained weight loss, dysuria or urinary frequency.    Vitamin D deficiency  When she remembers she will take 3 tablets of her vitamin D. Recent labs show vitamin D level of 24.    Prediabetes  Recent A1c has increased slightly to 6.0%.  Up from previous 5.7%.  She states that for the last 2 months she has been eating more sweets.    Dyslipidemia  She continues with rosuvastatin 5 mg every evening.  Recent labs show increase in LDL.  She has been tolerating the medication.    Elevated alkaline phosphatase level  Recent labs show persistent elevated alkaline phosphatase.  Recent labs have increased from previous.  She has not had an ultrasound at this time.  She has been having upper abdominal cramping.      Past Medical History:   Diagnosis Date    Anxiety     Arthritis     knee    Complex tear of medial meniscus of right knee, initial encounter 9/28/2018    Depression     Pain     Right knee       Social History     Tobacco Use    Smoking status: Never    Smokeless tobacco: Never   Vaping Use    Vaping Use: Never used   Substance Use Topics    Alcohol use: Not Currently     Alcohol/week: 0.6 oz     Types: 1 Glasses of wine per week    Drug use: No       Current Outpatient Medications Ordered in Epic   Medication " Sig Dispense Refill    rosuvastatin (CRESTOR) 10 MG Tab Take 1 Tablet by mouth every evening. 90 Tablet 1    VITAMIN D PO Take  by mouth.      Multiple Vitamins-Minerals (MULTIVITAMIN GUMMIES WOMENS PO) Take  by mouth.      tizanidine (ZANAFLEX) 2 MG capsule Take 2 Capsules by mouth 3 times a day as needed (muscle spasm). 30 Capsule 2    valacyclovir (VALTREX) 1 GM Tab TAKE 0.5 TABS BY MOUTH 2 TIMES A DAY AS NEEDED. 90 Tablet 1    sertraline (ZOLOFT) 100 MG Tab Take 1 Tablet by mouth every day. 90 Tablet 3    Zinc 50 MG Tab Take  by mouth. (Patient not taking: Reported on 9/27/2022)       No current [facility]-ordered facility-administered medications on file.       Allergies:  Codeine    Health Maintenance: Reviewed.  Due for mammogram and influenza.      Objective:     Vital signs reviewed  Exam:  /68 (BP Location: Left arm, Patient Position: Sitting, BP Cuff Size: Adult)   Pulse 65   Temp 37.2 °C (98.9 °F) (Temporal)   Ht 1.524 m (5')   Wt 92.1 kg (203 lb)   SpO2 98%   BMI 39.65 kg/m²  Body mass index is 39.65 kg/m².      General: Normal appearing. No distress.  Mother present in exam room.  Eyes: Eyes conjunctiva clear lids without ptosis, lids normal.  Glasses in place.  Pulmonary: Clear to ausculation.  Normal effort. No rales, ronchi, or wheezing.  Cardiovascular: Regular rate and rhythm without murmur.  Abdomen: Soft and nondistended. Normal bowel sounds. Liver and spleen are not palpable.  TTP to right upper quadrant without rebound tenderness.  TTP mid epigastric area without rebound tenderness.  Psych: Normal mood and affect. Alert and oriented x3. Judgment and insight is normal.    Assessment & Plan:     57 y.o. female with the following -     1. Upper abdominal pain  Acute uncomplicated problem.  No acute abdomen today.  Given her tenderness to the upper abdomen we will check abdominal ultrasound.  Epigastric tenderness we will check lipase level.  - LIPASE; Future  - US-ABDOMEN COMPLETE SURVEY;  Future    2. Elevated alkaline phosphatase level  Chronic exacerbated problem.  Discussed and reviewed her recent lab results.  She is having right upper quadrant pain will check ultrasound as well.  Differential diagnosis includes fatty liver disease, hepatitis.  - US-ABDOMEN COMPLETE SURVEY; Future    3. Dyslipidemia  Chronic exacerbated problem.  Discussed and reviewed her recent lab results.  LDL not controlled plan increase her rosuvastatin to 10 mg every evening.  She will be due for lipid panel around end of December 2022 or early January 2023.  - rosuvastatin (CRESTOR) 10 MG Tab; Take 1 Tablet by mouth every evening.  Dispense: 90 Tablet; Refill: 1    4. Prediabetes  Chronic exacerbated problem.  Discussed and reviewed her recent lab results.  Increasing A1c most likely related to her increase in sugary foods.  Recommend she follow decrease sugar diet with more lean proteins, vegetables and fruits.  She would like to schedule another appointment to discuss weight loss.    5. Vitamin D deficiency  Chronic exacerbated problem.  Recommend she take at least 1000 units daily vitamin D supplementation.    6. Encounter for screening mammogram for breast cancer  Chronic stable problem.  Due for annual mammogram.  - MA-SCREENING MAMMO BILAT W/TOMOSYNTHESIS W/CAD; Future    7. Flu vaccine need  Acute uncomplicated problem.  She would like her influenza vaccine today. I have placed the below orders and discussed them with an approved delegating provider. The MA is performing the below orders under the direction of Dr. Segura.  - INFLUENZA VACCINE QUAD INJ (PF)          Return for weight loss.    Please note that this dictation was created using voice recognition software. I have made every reasonable attempt to correct obvious errors, but I expect that there are errors of grammar and possibly content that I did not discover before finalizing the note.

## 2022-09-27 NOTE — ASSESSMENT & PLAN NOTE
When she remembers she will take 3 tablets of her vitamin D. Recent labs show vitamin D level of 24.

## 2022-09-27 NOTE — ASSESSMENT & PLAN NOTE
She continues with rosuvastatin 5 mg every evening.  Recent labs show increase in LDL.  She has been tolerating the medication.

## 2022-09-27 NOTE — ASSESSMENT & PLAN NOTE
Recent labs show persistent elevated alkaline phosphatase.  Recent labs have increased from previous.  She has not had an ultrasound at this time.  She has been having upper abdominal cramping.

## 2022-09-27 NOTE — ASSESSMENT & PLAN NOTE
Recent A1c has increased slightly to 6.0%.  Up from previous 5.7%.  She states that for the last 2 months she has been eating more sweets.

## 2022-10-04 ENCOUNTER — OFFICE VISIT (OUTPATIENT)
Dept: MEDICAL GROUP | Facility: PHYSICIAN GROUP | Age: 57
End: 2022-10-04
Payer: COMMERCIAL

## 2022-10-04 ENCOUNTER — HOSPITAL ENCOUNTER (OUTPATIENT)
Dept: LAB | Facility: MEDICAL CENTER | Age: 57
End: 2022-10-04
Attending: NURSE PRACTITIONER
Payer: COMMERCIAL

## 2022-10-04 VITALS
WEIGHT: 205 LBS | DIASTOLIC BLOOD PRESSURE: 70 MMHG | TEMPERATURE: 97.9 F | HEART RATE: 68 BPM | HEIGHT: 60 IN | SYSTOLIC BLOOD PRESSURE: 118 MMHG | BODY MASS INDEX: 40.25 KG/M2 | OXYGEN SATURATION: 95 %

## 2022-10-04 DIAGNOSIS — R10.10 UPPER ABDOMINAL PAIN: ICD-10-CM

## 2022-10-04 DIAGNOSIS — M72.2 PLANTAR FASCIITIS: ICD-10-CM

## 2022-10-04 DIAGNOSIS — E66.01 MORBID OBESITY WITH BMI OF 40.0-44.9, ADULT (HCC): ICD-10-CM

## 2022-10-04 LAB — LIPASE SERPL-CCNC: 46 U/L (ref 11–82)

## 2022-10-04 PROCEDURE — 99214 OFFICE O/P EST MOD 30 MIN: CPT | Performed by: NURSE PRACTITIONER

## 2022-10-04 PROCEDURE — 83690 ASSAY OF LIPASE: CPT

## 2022-10-04 PROCEDURE — 36415 COLL VENOUS BLD VENIPUNCTURE: CPT

## 2022-10-04 ASSESSMENT — FIBROSIS 4 INDEX: FIB4 SCORE: 1.19

## 2022-10-04 NOTE — ASSESSMENT & PLAN NOTE
Her BMI today is 40.04 kg/m2. She started walking but this caused more pain in her knees and joints. She is working on portion control. Decreasing fast food intake. She has not seen a dietician or any provider for weight loss. She is drinking 1 coca cola daily, previously drinking up to 4 day. She does not drink energy drinks. She does not drink coffee. History of dyslipidemia and prediabetes. Last A1C 6.0%.

## 2022-10-04 NOTE — ASSESSMENT & PLAN NOTE
She is using a roller at home. Icing after 12 hour day. She is wearing supportive shoes and trades out with [employer]. She is stretching in the shower. She is taking Advil as needed. She would like a podiatry referral. Last flare was 10 years ago.

## 2022-10-04 NOTE — PROGRESS NOTES
Subjective:     CC: Weight loss and referral needed    HPI:   Toya presents today with the following:    Plantar fasciitis  She is using a roller at home. Icing after 12 hour day. She is wearing supportive shoes and trades out with [employer]. She is stretching in the shower. She is taking Advil as needed. She would like a podiatry referral. Last flare was 10 years ago.     Morbid obesity with BMI of 40.0-44.9, adult ([facility])  Her BMI today is 40.04 kg/m2. She started walking but this caused more pain in her knees and joints. She is working on portion control. Decreasing fast food intake. She has not seen a dietician or any provider for weight loss. She is drinking 1 coca cola daily, previously drinking up to 4 day. She does not drink energy drinks. She does not drink coffee. History of dyslipidemia and prediabetes. Last A1C 6.0%.       Past Medical History:   Diagnosis Date    Anxiety     Arthritis     knee    Complex tear of medial meniscus of right knee, initial encounter 9/28/2018    Depression     Pain     Right knee       Social History     Tobacco Use    Smoking status: Never    Smokeless tobacco: Never   Vaping Use    Vaping Use: Never used   Substance Use Topics    Alcohol use: Not Currently     Alcohol/week: 0.6 oz     Types: 1 Glasses of wine per week    Drug use: No       Current Outpatient Medications Ordered in Epic   Medication Sig Dispense Refill    rosuvastatin (CRESTOR) 10 MG Tab Take 1 Tablet by mouth every evening. 90 Tablet 1    Zinc 50 MG Tab Take  by mouth.      VITAMIN D PO Take  by mouth.      Multiple Vitamins-Minerals (MULTIVITAMIN GUMMIES WOMENS PO) Take  by mouth.      tizanidine (ZANAFLEX) 2 MG capsule Take 2 Capsules by mouth 3 times a day as needed (muscle spasm). 30 Capsule 2    valacyclovir (VALTREX) 1 GM Tab TAKE 0.5 TABS BY MOUTH 2 TIMES A DAY AS NEEDED. 90 Tablet 1    sertraline (ZOLOFT) 100 MG Tab Take 1 Tablet by mouth every day. 90 Tablet 3     No current Epic-ordered  facility-administered medications on file.       Allergies:  Codeine    Health Maintenance: Reviewed.       Objective:     Vital signs reviewed  Exam:  /70 (BP Location: Left arm, Patient Position: Sitting, BP Cuff Size: Adult)   Pulse 68   Temp 36.6 °C (97.9 °F) (Temporal)   Ht 1.524 m (5')   Wt 93 kg (205 lb)   SpO2 95%   BMI 40.04 kg/m²  Body mass index is 40.04 kg/m².    Gen: Alert and oriented, No apparent distress.  Lungs: Normal effort, CTA bilaterally, no wheezes, rhonchi, or rales  CV: Regular rate and rhythm. No murmurs, rubs, or gallops.  Ext: No clubbing, cyanosis, edema.        Assessment & Plan:     57 y.o. female with the following -     1. Morbid obesity with BMI of 40.0-44.9, adult (HCC)  Chronic stable problem.  Reviewed her BMI today.  We did discuss starting medication which would include phentermine for Wegovy.  We also discussed seeing a dietitian as well as a weight loss program and she is in agreement for a weight loss program.  She is interested in the [email or web address] program.  Exercise goals of 150 minutes weekly.  She did recently buy a stationary bicycle we discussed starting using her stationary bicycle.  Referral placed to nutrition.  We will send a referral to [email or web address].  For her cholesterol she will continue with her rosuvastatin 10 mg every evening.  - Patient identified as having weight management issue.  Appropriate orders and counseling given.  - Referral to Nutrition Services    2. Plantar fasciitis  Chronic exacerbated problem.  Discussed even using topical Voltaren to the bottom of her foot.  Continue with stretching and icing.  Referral to podiatry.  - Referral to Podiatry      Return if symptoms worsen or fail to improve.    Please note that this dictation was created using voice recognition software. I have made every reasonable attempt to correct obvious errors, but I expect that there are errors of grammar and possibly content that I did not discover before  finalizing the note.

## 2022-12-28 ENCOUNTER — OFFICE VISIT (OUTPATIENT)
Dept: MEDICAL GROUP | Facility: PHYSICIAN GROUP | Age: 57
End: 2022-12-28
Payer: COMMERCIAL

## 2022-12-28 VITALS
HEIGHT: 60 IN | RESPIRATION RATE: 15 BRPM | TEMPERATURE: 97.1 F | BODY MASS INDEX: 40.84 KG/M2 | HEART RATE: 75 BPM | SYSTOLIC BLOOD PRESSURE: 110 MMHG | DIASTOLIC BLOOD PRESSURE: 74 MMHG | OXYGEN SATURATION: 95 % | WEIGHT: 208 LBS

## 2022-12-28 DIAGNOSIS — F32.A ANXIETY AND DEPRESSION: ICD-10-CM

## 2022-12-28 DIAGNOSIS — F41.9 ANXIETY AND DEPRESSION: ICD-10-CM

## 2022-12-28 DIAGNOSIS — G89.29 CHRONIC PAIN OF LEFT KNEE: ICD-10-CM

## 2022-12-28 DIAGNOSIS — M25.562 CHRONIC PAIN OF LEFT KNEE: ICD-10-CM

## 2022-12-28 PROCEDURE — 99214 OFFICE O/P EST MOD 30 MIN: CPT | Performed by: STUDENT IN AN ORGANIZED HEALTH CARE EDUCATION/TRAINING PROGRAM

## 2022-12-28 RX ORDER — SERTRALINE HYDROCHLORIDE 100 MG/1
100 TABLET, FILM COATED ORAL DAILY
Qty: 90 TABLET | Refills: 0 | Status: SHIPPED | OUTPATIENT
Start: 2022-12-28 | End: 2023-05-01

## 2022-12-28 ASSESSMENT — FIBROSIS 4 INDEX: FIB4 SCORE: 1.19

## 2022-12-28 NOTE — LETTER
[facility]  [address] [address]     December 28, 2022    Patient: Toya Shields   YOB: 1965   Date of Visit: 12/28/2022       To Whom It May Concern:    Toya Shields was seen and treated in our department on 12/28/2022. Please excuse her from work until 12/30/2022. Patient may return to work on 01/02/2023. If you have any questions or concerns please call our office.    Sincerely,     Mariann Martinez M.D.

## 2022-12-28 NOTE — PROGRESS NOTES
Subjective:     CC: Left knee pain    HPI:   Toya presents today with chronic left knee pain that has been worsening.  Patient reports onset 6 to 8 years ago and at that time she was told she had 2 small tears for which she completed PT.  Unfortunately there is no imaging to review.  In the past patient saw Dr. Noonan for her right knee surgery and requested to follow-up with him.  Patient denies any trauma or inciting event in the past.  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  Patient reports she is unable to work due to pain with weightbearing.  Patient has been using crutches to ambulate.  Patient is alternating Tylenol and Advil and is also been using Voltaren gel.  Patient describes the pain as deep below her kneecap at the tibial plateau with associated medial and lateral swelling.      ROS:  Constitutional:  Negative for chills, fever, fatigue, weight loss.  HEENT:  Negative for blurred vision, hearing loss, sore throat.    Respiratory:  Negative for cough, sputum production and shortness of breath.    Cardiovascular:  Negative for chest pain, palpitations and leg swelling.   Gastrointestinal:  Negative for abdominal pain, blood in stool, constipation, diarrhea and vomiting.   Musculoskeletal:  Positive for left knee pain but negative for back pain, falls, and neck pain.   Skin:  Negative for rash.   Neurological:  Negative for dizziness, seizures, weakness and headaches.   Endo/Heme/Allergies:  Does not bruise/bleed easily.   Psychiatric/Behavioral:  Negative for depression, anxiety and suicidal thoughts.      Objective:     Exam:  /74   Pulse 75   Temp 36.2 °C (97.1 °F) (Temporal)   Resp 15   Ht 1.524 m (5')   Wt 94.3 kg (208 lb)   SpO2 95%   BMI 40.62 kg/m²  Body mass index is 40.62 kg/m².    Physical Exam    Gen: Alert and oriented, no acute distress.  Lungs: Normal effort, CTAB, no wheezing / rhonchi /  rales.  CV: RRR, normal S1 and S2, no murmurs.  MSK:  Swelling in left knee. Negative anterior and posterior drawer tests. No valgus or varus laxity.    Assessment & Plan:     57 y.o. female with the following -     1. Chronic pain of left knee  Chronic.  Given note for work and urgent referral to Ortho.  - Referral to Orthopedics    2. Anxiety and depression  Chronic. Zoloft 100 mg daily refilled today.  - sertraline (ZOLOFT) 100 MG Tab; Take 1 Tablet by mouth every day.  Dispense: 90 Tablet; Refill: 0        I spent a total of 30 minutes with record review, exam, communication with the patient, communication with other providers, and documentation of this encounter.      Return if symptoms worsen or fail to improve.    Please note that this dictation was created using voice recognition software. I have made every reasonable attempt to correct obvious errors, but I expect that there are errors of grammar and possibly content that I did not discover before finalizing the note.

## 2022-12-28 NOTE — Clinical Note
Angelo Matias, I saw your patient today for left knee pain. She saw Dr. Noonan in the past for right knee surgery and I put in a referral back to him.

## 2023-01-11 ENCOUNTER — HOSPITAL ENCOUNTER (OUTPATIENT)
Dept: RADIOLOGY | Facility: MEDICAL CENTER | Age: 58
End: 2023-01-11
Attending: NURSE PRACTITIONER
Payer: COMMERCIAL

## 2023-01-11 DIAGNOSIS — Z12.31 ENCOUNTER FOR SCREENING MAMMOGRAM FOR BREAST CANCER: ICD-10-CM

## 2023-01-11 PROCEDURE — 77063 BREAST TOMOSYNTHESIS BI: CPT

## 2023-01-16 ENCOUNTER — HOSPITAL ENCOUNTER (OUTPATIENT)
Dept: RADIOLOGY | Facility: MEDICAL CENTER | Age: 58
End: 2023-01-16
Attending: ORTHOPAEDIC SURGERY
Payer: COMMERCIAL

## 2023-01-16 DIAGNOSIS — S83.232A COMPLEX TEAR OF MEDIAL MENISCUS OF LEFT KNEE AS CURRENT INJURY, INITIAL ENCOUNTER: ICD-10-CM

## 2023-01-16 PROCEDURE — 73721 MRI JNT OF LWR EXTRE W/O DYE: CPT | Mod: LT

## 2023-01-19 PROBLEM — S83.232A COMPLEX TEAR OF MEDIAL MENISCUS OF LEFT KNEE AS CURRENT INJURY: Status: ACTIVE | Noted: 2023-01-19

## 2023-01-19 PROBLEM — M67.462 GANGLION OF LEFT KNEE: Status: ACTIVE | Noted: 2023-01-19

## 2023-02-06 ENCOUNTER — APPOINTMENT (OUTPATIENT)
Dept: ADMISSIONS | Facility: MEDICAL CENTER | Age: 58
End: 2023-02-06
Payer: COMMERCIAL

## 2023-02-06 ENCOUNTER — PRE-ADMISSION TESTING (OUTPATIENT)
Dept: ADMISSIONS | Facility: MEDICAL CENTER | Age: 58
End: 2023-02-06
Attending: ORTHOPAEDIC SURGERY
Payer: COMMERCIAL

## 2023-02-06 VITALS — BODY MASS INDEX: 40.62 KG/M2 | HEIGHT: 60 IN

## 2023-02-06 RX ORDER — ACETAMINOPHEN 500 MG
1000 TABLET ORAL EVERY 6 HOURS PRN
Status: ON HOLD | COMMUNITY
End: 2023-02-22

## 2023-02-06 RX ORDER — BUTYROSPERMUM PARKII(SHEA BUTTER), SIMMONDSIA CHINENSIS (JOJOBA) SEED OIL, ALOE BARBADENSIS LEAF EXTRACT .01; 1; 3.5 G/100G; G/100G; G/100G
5000 LIQUID TOPICAL DAILY
COMMUNITY

## 2023-02-06 RX ORDER — IBUPROFEN 200 MG
400 TABLET ORAL EVERY 6 HOURS PRN
COMMUNITY
End: 2023-10-22

## 2023-02-06 NOTE — PREPROCEDURE INSTRUCTIONS
Pre admit appointment completed. Patient instructed to continue regularly prescribed medications through day before surgery. Instructed to take the following medications the day of surgery as needed with a sip of water per anesthesia protocol: acetaminophen, tizanidine, valacyclovir.     Patient instructed to notify Surgeon if she becomes ill prior to surgery.

## 2023-02-22 ENCOUNTER — ANESTHESIA (OUTPATIENT)
Dept: SURGERY | Facility: MEDICAL CENTER | Age: 58
End: 2023-02-22
Payer: COMMERCIAL

## 2023-02-22 ENCOUNTER — ANESTHESIA EVENT (OUTPATIENT)
Dept: SURGERY | Facility: MEDICAL CENTER | Age: 58
End: 2023-02-22
Payer: COMMERCIAL

## 2023-02-22 ENCOUNTER — HOSPITAL ENCOUNTER (OUTPATIENT)
Facility: MEDICAL CENTER | Age: 58
End: 2023-02-22
Attending: ORTHOPAEDIC SURGERY | Admitting: ORTHOPAEDIC SURGERY
Payer: COMMERCIAL

## 2023-02-22 VITALS
BODY MASS INDEX: 39.91 KG/M2 | HEIGHT: 60 IN | OXYGEN SATURATION: 93 % | WEIGHT: 203.26 LBS | TEMPERATURE: 98.2 F | DIASTOLIC BLOOD PRESSURE: 75 MMHG | HEART RATE: 78 BPM | RESPIRATION RATE: 16 BRPM | SYSTOLIC BLOOD PRESSURE: 123 MMHG

## 2023-02-22 DIAGNOSIS — M67.462 GANGLION OF LEFT KNEE: ICD-10-CM

## 2023-02-22 DIAGNOSIS — S83.232A COMPLEX TEAR OF MEDIAL MENISCUS OF LEFT KNEE AS CURRENT INJURY, INITIAL ENCOUNTER: ICD-10-CM

## 2023-02-22 PROCEDURE — A9270 NON-COVERED ITEM OR SERVICE: HCPCS | Performed by: STUDENT IN AN ORGANIZED HEALTH CARE EDUCATION/TRAINING PROGRAM

## 2023-02-22 PROCEDURE — 700111 HCHG RX REV CODE 636 W/ 250 OVERRIDE (IP): Performed by: STUDENT IN AN ORGANIZED HEALTH CARE EDUCATION/TRAINING PROGRAM

## 2023-02-22 PROCEDURE — 160041 HCHG SURGERY MINUTES - EA ADDL 1 MIN LEVEL 4: Performed by: ORTHOPAEDIC SURGERY

## 2023-02-22 PROCEDURE — 700102 HCHG RX REV CODE 250 W/ 637 OVERRIDE(OP): Performed by: STUDENT IN AN ORGANIZED HEALTH CARE EDUCATION/TRAINING PROGRAM

## 2023-02-22 PROCEDURE — 700111 HCHG RX REV CODE 636 W/ 250 OVERRIDE (IP): Performed by: ORTHOPAEDIC SURGERY

## 2023-02-22 PROCEDURE — 160009 HCHG ANES TIME/MIN: Performed by: ORTHOPAEDIC SURGERY

## 2023-02-22 PROCEDURE — 160025 RECOVERY II MINUTES (STATS): Performed by: ORTHOPAEDIC SURGERY

## 2023-02-22 PROCEDURE — 160048 HCHG OR STATISTICAL LEVEL 1-5: Performed by: ORTHOPAEDIC SURGERY

## 2023-02-22 PROCEDURE — 29880 ARTHRS KNE SRG MNISECTMY M&L: CPT | Mod: ASROC,LT | Performed by: PHYSICIAN ASSISTANT

## 2023-02-22 PROCEDURE — 64447 NJX AA&/STRD FEMORAL NRV IMG: CPT | Mod: 59,LT | Performed by: STUDENT IN AN ORGANIZED HEALTH CARE EDUCATION/TRAINING PROGRAM

## 2023-02-22 PROCEDURE — 160036 HCHG PACU - EA ADDL 30 MINS PHASE I: Performed by: ORTHOPAEDIC SURGERY

## 2023-02-22 PROCEDURE — 64447 NJX AA&/STRD FEMORAL NRV IMG: CPT | Performed by: ORTHOPAEDIC SURGERY

## 2023-02-22 PROCEDURE — 160046 HCHG PACU - 1ST 60 MINS PHASE II: Performed by: ORTHOPAEDIC SURGERY

## 2023-02-22 PROCEDURE — 160029 HCHG SURGERY MINUTES - 1ST 30 MINS LEVEL 4: Performed by: ORTHOPAEDIC SURGERY

## 2023-02-22 PROCEDURE — 29880 ARTHRS KNE SRG MNISECTMY M&L: CPT | Mod: LT | Performed by: ORTHOPAEDIC SURGERY

## 2023-02-22 PROCEDURE — 700101 HCHG RX REV CODE 250: Performed by: ORTHOPAEDIC SURGERY

## 2023-02-22 PROCEDURE — 160035 HCHG PACU - 1ST 60 MINS PHASE I: Performed by: ORTHOPAEDIC SURGERY

## 2023-02-22 PROCEDURE — 160002 HCHG RECOVERY MINUTES (STAT): Performed by: ORTHOPAEDIC SURGERY

## 2023-02-22 PROCEDURE — 700105 HCHG RX REV CODE 258: Performed by: ORTHOPAEDIC SURGERY

## 2023-02-22 PROCEDURE — 700101 HCHG RX REV CODE 250: Performed by: STUDENT IN AN ORGANIZED HEALTH CARE EDUCATION/TRAINING PROGRAM

## 2023-02-22 PROCEDURE — 01400 ANES OPN/ARTHRS KNEE JT NOS: CPT | Performed by: STUDENT IN AN ORGANIZED HEALTH CARE EDUCATION/TRAINING PROGRAM

## 2023-02-22 RX ORDER — SODIUM CHLORIDE, SODIUM LACTATE, POTASSIUM CHLORIDE, CALCIUM CHLORIDE 600; 310; 30; 20 MG/100ML; MG/100ML; MG/100ML; MG/100ML
INJECTION, SOLUTION INTRAVENOUS CONTINUOUS
Status: ACTIVE | OUTPATIENT
Start: 2023-02-22 | End: 2023-02-22

## 2023-02-22 RX ORDER — OXYCODONE HCL 5 MG/5 ML
10 SOLUTION, ORAL ORAL
Status: COMPLETED | OUTPATIENT
Start: 2023-02-22 | End: 2023-02-22

## 2023-02-22 RX ORDER — ONDANSETRON 4 MG/1
4 TABLET, FILM COATED ORAL EVERY 4 HOURS PRN
Qty: 20 TABLET | Refills: 0 | Status: SHIPPED | OUTPATIENT
Start: 2023-02-22 | End: 2023-10-22

## 2023-02-22 RX ORDER — HYDROMORPHONE HYDROCHLORIDE 1 MG/ML
0.4 INJECTION, SOLUTION INTRAMUSCULAR; INTRAVENOUS; SUBCUTANEOUS
Status: DISCONTINUED | OUTPATIENT
Start: 2023-02-22 | End: 2023-02-22 | Stop reason: HOSPADM

## 2023-02-22 RX ORDER — LIDOCAINE HYDROCHLORIDE 20 MG/ML
INJECTION, SOLUTION EPIDURAL; INFILTRATION; INTRACAUDAL; PERINEURAL PRN
Status: DISCONTINUED | OUTPATIENT
Start: 2023-02-22 | End: 2023-02-22 | Stop reason: SURG

## 2023-02-22 RX ORDER — HYDRALAZINE HYDROCHLORIDE 20 MG/ML
5 INJECTION INTRAMUSCULAR; INTRAVENOUS
Status: DISCONTINUED | OUTPATIENT
Start: 2023-02-22 | End: 2023-02-22 | Stop reason: HOSPADM

## 2023-02-22 RX ORDER — HYDROCODONE BITARTRATE AND ACETAMINOPHEN 5; 325 MG/1; MG/1
1-2 TABLET ORAL EVERY 4 HOURS PRN
Qty: 30 TABLET | Refills: 0 | Status: SHIPPED | OUTPATIENT
Start: 2023-02-22 | End: 2023-02-27

## 2023-02-22 RX ORDER — BUPIVACAINE HYDROCHLORIDE AND EPINEPHRINE 5; 5 MG/ML; UG/ML
INJECTION, SOLUTION EPIDURAL; INTRACAUDAL; PERINEURAL
Status: COMPLETED | OUTPATIENT
Start: 2023-02-22 | End: 2023-02-22

## 2023-02-22 RX ORDER — CEFAZOLIN SODIUM 1 G/3ML
2 INJECTION, POWDER, FOR SOLUTION INTRAMUSCULAR; INTRAVENOUS ONCE
Status: COMPLETED | OUTPATIENT
Start: 2023-02-22 | End: 2023-02-22

## 2023-02-22 RX ORDER — EPHEDRINE SULFATE 50 MG/ML
INJECTION, SOLUTION INTRAVENOUS PRN
Status: DISCONTINUED | OUTPATIENT
Start: 2023-02-22 | End: 2023-02-22 | Stop reason: SURG

## 2023-02-22 RX ORDER — HYDROMORPHONE HYDROCHLORIDE 1 MG/ML
0.1 INJECTION, SOLUTION INTRAMUSCULAR; INTRAVENOUS; SUBCUTANEOUS
Status: DISCONTINUED | OUTPATIENT
Start: 2023-02-22 | End: 2023-02-22 | Stop reason: HOSPADM

## 2023-02-22 RX ORDER — HYDROMORPHONE HYDROCHLORIDE 1 MG/ML
0.2 INJECTION, SOLUTION INTRAMUSCULAR; INTRAVENOUS; SUBCUTANEOUS
Status: DISCONTINUED | OUTPATIENT
Start: 2023-02-22 | End: 2023-02-22 | Stop reason: HOSPADM

## 2023-02-22 RX ORDER — DIPHENHYDRAMINE HYDROCHLORIDE 50 MG/ML
12.5 INJECTION INTRAMUSCULAR; INTRAVENOUS
Status: DISCONTINUED | OUTPATIENT
Start: 2023-02-22 | End: 2023-02-22 | Stop reason: HOSPADM

## 2023-02-22 RX ORDER — ROPIVACAINE HYDROCHLORIDE 5 MG/ML
INJECTION, SOLUTION EPIDURAL; INFILTRATION; PERINEURAL
Status: DISCONTINUED | OUTPATIENT
Start: 2023-02-22 | End: 2023-02-22 | Stop reason: HOSPADM

## 2023-02-22 RX ORDER — ONDANSETRON 2 MG/ML
4 INJECTION INTRAMUSCULAR; INTRAVENOUS
Status: DISCONTINUED | OUTPATIENT
Start: 2023-02-22 | End: 2023-02-22 | Stop reason: HOSPADM

## 2023-02-22 RX ORDER — DEXAMETHASONE SODIUM PHOSPHATE 4 MG/ML
INJECTION, SOLUTION INTRA-ARTICULAR; INTRALESIONAL; INTRAMUSCULAR; INTRAVENOUS; SOFT TISSUE PRN
Status: DISCONTINUED | OUTPATIENT
Start: 2023-02-22 | End: 2023-02-22 | Stop reason: SURG

## 2023-02-22 RX ORDER — MEPERIDINE HYDROCHLORIDE 25 MG/ML
12.5 INJECTION INTRAMUSCULAR; INTRAVENOUS; SUBCUTANEOUS
Status: DISCONTINUED | OUTPATIENT
Start: 2023-02-22 | End: 2023-02-22 | Stop reason: HOSPADM

## 2023-02-22 RX ORDER — LABETALOL HYDROCHLORIDE 5 MG/ML
5 INJECTION, SOLUTION INTRAVENOUS
Status: DISCONTINUED | OUTPATIENT
Start: 2023-02-22 | End: 2023-02-22 | Stop reason: HOSPADM

## 2023-02-22 RX ORDER — OXYCODONE HCL 5 MG/5 ML
5 SOLUTION, ORAL ORAL
Status: COMPLETED | OUTPATIENT
Start: 2023-02-22 | End: 2023-02-22

## 2023-02-22 RX ORDER — HALOPERIDOL 5 MG/ML
1 INJECTION INTRAMUSCULAR
Status: DISCONTINUED | OUTPATIENT
Start: 2023-02-22 | End: 2023-02-22 | Stop reason: HOSPADM

## 2023-02-22 RX ADMIN — FENTANYL CITRATE 50 MCG: 50 INJECTION, SOLUTION INTRAMUSCULAR; INTRAVENOUS at 11:17

## 2023-02-22 RX ADMIN — BUPIVACAINE HYDROCHLORIDE AND EPINEPHRINE 20 ML: 5; 5 INJECTION, SOLUTION EPIDURAL; INTRACAUDAL; PERINEURAL at 11:07

## 2023-02-22 RX ADMIN — EPHEDRINE SULFATE 5 MG: 50 INJECTION, SOLUTION INTRAVENOUS at 11:20

## 2023-02-22 RX ADMIN — PROPOFOL 150 MG: 10 INJECTION, EMULSION INTRAVENOUS at 11:20

## 2023-02-22 RX ADMIN — DEXAMETHASONE SODIUM PHOSPHATE 4 MG: 4 INJECTION, SOLUTION INTRA-ARTICULAR; INTRALESIONAL; INTRAMUSCULAR; INTRAVENOUS; SOFT TISSUE at 11:23

## 2023-02-22 RX ADMIN — FENTANYL CITRATE 50 MCG: 50 INJECTION, SOLUTION INTRAMUSCULAR; INTRAVENOUS at 11:55

## 2023-02-22 RX ADMIN — SODIUM CHLORIDE, POTASSIUM CHLORIDE, SODIUM LACTATE AND CALCIUM CHLORIDE: 600; 310; 30; 20 INJECTION, SOLUTION INTRAVENOUS at 09:34

## 2023-02-22 RX ADMIN — OXYCODONE HYDROCHLORIDE 5 MG: 5 SOLUTION ORAL at 12:46

## 2023-02-22 RX ADMIN — LIDOCAINE HYDROCHLORIDE 100 MG: 20 INJECTION, SOLUTION EPIDURAL; INFILTRATION; INTRACAUDAL at 11:20

## 2023-02-22 RX ADMIN — CEFAZOLIN 2 G: 330 INJECTION, POWDER, FOR SOLUTION INTRAMUSCULAR; INTRAVENOUS at 11:22

## 2023-02-22 RX ADMIN — FENTANYL CITRATE 25 MCG: 50 INJECTION, SOLUTION INTRAMUSCULAR; INTRAVENOUS at 12:48

## 2023-02-22 ASSESSMENT — FIBROSIS 4 INDEX: FIB4 SCORE: 1.19

## 2023-02-22 NOTE — ANESTHESIA POSTPROCEDURE EVALUATION
Patient: Toya Shields    Procedure Summary     Date: 02/22/23 Room / Location:  OR  / SURGERY [facility]    Anesthesia Start: 1117 Anesthesia Stop: 1213    Procedures:       Knee arthroscopy with  versus partial medial meniscectomy, chondroplasty (Left: Knee)      EXCISION, GANGLION CYST (Left: Knee) Diagnosis:       Complex tear of medial meniscus of left knee as current injury      Ganglion of left knee      (Complex tear of medial meniscus of left knee as current injury)    Surgeons: Aubrey Noonan M.D. Responsible Provider: Sorin Myrick D.O.    Anesthesia Type: general, peripheral nerve block ASA Status: 2          Final Anesthesia Type: general, peripheral nerve block  Last vitals  BP   Blood Pressure: 118/51    Temp   36.2 °C (97.2 °F)    Pulse   75   Resp   16    SpO2   98 %      Anesthesia Post Evaluation    Patient location during evaluation: PACU  Patient participation: complete - patient participated  Level of consciousness: awake and alert    Airway patency: patent  Anesthetic complications: no  Cardiovascular status: hemodynamically stable  Respiratory status: acceptable  Hydration status: euvolemic    PONV: none          No notable events documented.     Nurse Pain Score: 4 (NPRS)

## 2023-02-22 NOTE — ANESTHESIA TIME REPORT
Anesthesia Start and Stop Event Times     Date Time Event    2/22/2023 1115 Ready for Procedure     1117 Anesthesia Start     1213 Anesthesia Stop        Responsible Staff  02/22/23    Name Role Begin End    Sorin Myrick D.O. Anesth 1117 1213        Overtime Reason:  no overtime (within assigned shift)    Comments:

## 2023-02-22 NOTE — ANESTHESIA PROCEDURE NOTES
Peripheral Block    Date/Time: 2/22/2023 11:07 AM  Performed by: Sorin Myrick D.O.  Authorized by: Sorin Myrick D.O.     Start Time:  2/22/2023 11:07 AM  End Time:  2/22/2023 11:12 AM  Reason for Block: at surgeon's request and post-op pain management ONLY    patient identified, IV checked, site marked, risks and benefits discussed, surgical consent, monitors and equipment checked, pre-op evaluation and timeout performed    Patient Position:  Supine  Prep: ChloraPrep    Monitoring:  Heart rate, continuous pulse ox and cardiac monitor  Block Region:  Lower Extremity  Lower Extremity - Block Type:  Selective FEMORAL nerve block at the Adductor Canal    Laterality:  Left  Procedures: ultrasound guided  Image captured, interpreted and electronically stored.  Local Infiltration:  Lidocaine  Strength:  1 %  Dose:  3 ml  Block Type:  Single-shot  Needle Localization:  Ultrasound guidance  Injection Assessment:  Negative aspiration for heme, no paresthesia on injection, incremental injection and local visualized surrounding nerve on ultrasound   US Guided Selective Femoral Nerve Block at Adductor Canal:   US probe placed at mid-thigh level on externally rotated leg and femur identified.  Probe directed medially until Sartorius Muscle (SM), Femoral Artery (FA) and Saphenous Nerve (SN) identified in Adductor Canal (AC).  Needle inserted anterolateral to probe in an in plane approach into a subsartorial perivascular perineural position.  After negative aspiration LA injected with ease and visualized spreading within the AC.

## 2023-02-22 NOTE — OR NURSING
6568 Procedure, patient allergies and NPO status verified. Home med rec completed and belongings secured. Patient verbalizes understanding of pain scale, expected course of stay and plan of care. IV access established. SCD placed on bilateral calves.     8591 Preop complete.

## 2023-02-22 NOTE — ANESTHESIA PREPROCEDURE EVALUATION
Case: [id number] Date/Time: 02/22/23 1045    Procedures:       Knee arthroscopy with medial meniscus root repair versus partial medial meniscectomy, chondroplasty, and ganglion cyst decompression, surgeries as indicated (Left: Knee)      EXCISION, GANGLION CYST    Diagnosis:       Complex tear of medial meniscus of left knee as current injury [S83.232A]      Ganglion of left knee [M67.462]    Pre-op diagnosis: Complex tear of medial meniscus of left knee as current injury [S83.232A]    Location:  OR  / SURGERY [facility]    Surgeons: Aubrey Noonan M.D.          Relevant Problems   No relevant active problems       Physical Exam    Airway   Mallampati: II  TM distance: >3 FB  Neck ROM: full       Cardiovascular - normal exam  Rhythm: regular  Rate: normal  (-) murmur     Dental - normal exam           Pulmonary - normal exam  Breath sounds clear to auscultation     Abdominal    Neurological - normal exam                 Anesthesia Plan    ASA 2       Plan - general and peripheral nerve block     Peripheral nerve block will be post-op pain control  Airway plan will be LMA          Induction: intravenous    Postoperative Plan: Postoperative administration of opioids is intended.    Pertinent diagnostic labs and testing reviewed    Informed Consent:    Anesthetic plan and risks discussed with patient.    Use of blood products discussed with: patient whom consented to blood products.

## 2023-02-22 NOTE — DISCHARGE INSTR - OTHER INFO
May remove dressings Post op Day #2 (Friday) and Shower with wound uncovered.  Apply bandaids after shower.  Do not soak or submerge incisions for two weeks.     Weight bearing as tolerated.    Ice and elevate extremity. Follow up 7-10 days.    Take 1 Buffered ASA 81 mg x 10 days post-operatively for DVT prohylaxis

## 2023-02-22 NOTE — OR NURSING
1201: To PACU post left knee arthroscopy w/ block. OPA in place. Palpable DP pulse observed on operative extremity. Ice applied.    1214: OPA dc'd, breathing is spontaneous and unlabored.    1226: Pt currently denies pain or nausea.    1230: Pain is tolerable at 5/10.    1249: Medicated for pain of 6/10.    1257: Pain is tolerable at 4-5/10.    1328: Pain remains tolerable w/o nausea. Meets criteria for stage ll.

## 2023-02-22 NOTE — LETTER
January 23, 2023    Patient Name: Toya Shields  Surgeon Name: Aubrey Noonan M.D.  Surgery Facility: [facility] ([address] [facility] [place])  Surgery Date: 2/22/2023    The time of your surgery is not final and may change up to and until the day of your surgery. You will be contacted 24-48 hours prior to your surgery date with your check-in and surgery time.    If you will not be at one of the below numbers please call the surgery scheduler at [phone]  Preferred Phone: [phone]    BEFORE YOUR SURGERY   Pre Registration and/or Lab Work must be done within and no earlier than 28 days prior to your surgery date. Please call [facility] at [phone] for an appointment as soon as possible.    DAY OF YOUR SURGERY  Nothing to eat or drink after midnight     Refrain from smoking any substance after midnight prior to surgery. Smoking may interfere with the anesthetic and frequently produces nausea during the recovery period.    Continue taking all lifesaving medications. Including the morning of your surgery with small sip of water.    Please do NOT take on the day of surgery:  Diuretics: examples- furosemide (Lasix), spironolactone, hydrochlorothiazide  ACE-inhibitors: examples- lisinopril, ramipril, enalapril  “ARBs”: examples- losartan, Olmesartan, valsartan    Please arrive at the hospital/surgery center at the check-in time provided.     An adult will need to bring you and take you home after your surgery.     AFTER YOUR SURGERY  Post op Appointment:   Date: 03/02/2023   Time: 10:15AM   With: Aubrey Noonan M.D.   Location: [address] [place] [address]    - Therapy- Your first appointment should be 7-10  day(s) after your surgery. For your convenience we have 4 Physical Therapy locations: [facility], [place], and [facility]. Call our office ASAP to schedule an appointment at [phone] or take the enclosed Therapy  Prescription to a facility of your choice.  - Post Surgery - You will need someone to drive you home  - Post Surgery - You will need someone to stay with you for 24 hours  - You must have someone provide transportation post surgery and someone to monitor you for at least 24 hours post-surgery. If you don't have either of these your appointment will be canceled.     TIME OFF WORK  FMLA or Disability forms can be faxed directly to: [phone] or you may drop them off at [address] [place] [address]. Our office charges a $35.00 fee per form. Forms will be completed within 10 business days of drop off and payment received. For the status of your forms you may contact our disability office directly [phone].    MEDICATION INSTRUCTIONS **Please read section completely**    The following medications should be stopped a minimum of 10 days prior to surgery:  All over the counter, Supplements & Herbal medications    Anorectics: Phentermine (Adipex-P, Lomaira and Suprenza), Phentermine-topiramate (Qsymia), Bupropion-naltrexone (Contrave)    Opiod Partial Agonists/Opioid Antagonists: Buprenorphine (Subocone, Belbuca, Butrans, Probuphine Implant, Sublocade), Naltrexone (ReVia, Vivitrol), Naloxone    Amphetamines: Dextroamphetamine/Amphetamine (Adderall, Mydayis), Methylphenidate Hydrochloride (Concerta, Metadate, Methylin, Ritalin)    The following medications should be stopped 5 days prior to surgery:  Blood Thinners: Any Aspirin, Aspirin products, anti-inflammatories such as ibuprofen and any blood thinners such as Coumadin and Plavix. Please consult your prescribing physician if you are on life saving blood thinners, in regards to when to stop medications prior to surgery.     The following medications should be stopped a minimum of 3 days prior to surgery:  PDE-5 inhibitors: Sildenafil (Viagra), Tadalafil (Cialis), Vardenafil (Levitra), Avanafil (Stendra)    MAO Inhibitors: Rasagiline (Azilect), Selegiline  (Eldepryl, Emsam, Selapar), Isocarboxazid (Marplan), Phenelzine (Nardil)         COVID and INFLUENZA NOTICE TO PATIENTS    Currently, the [facility] does not routinely test patients for COVID-19 or Influenza prior to their elective surgery.  However, if patients develop the following symptoms prior to their surgery date, they should voluntarily test for COVID-19 and Influenza and notify the surgical office of their condition and results.  The symptoms warranting testing would be any two of the following:    Fever (Temp above 100.4 F)  Chills  Cough  Shortness of breath or difficulty breathing  Fatigue  Myalgias (muscle or body aches)  Headache  Sore Throat  Congestion or Runny Nose  Nausea or vomiting  Diarrhea  New loss of taste or smell    Having these symptoms prior to surgery can significantly increase your risk of morbidity and mortality under anesthesia, which may compromise your health and require a transfer to a hospital for a higher level of care.  Therefore, it is advisable to notify the surgical team of any illness in order to get information for the appropriate time to delay the surgery to minimize these preventable risks.    Your health and safety are our number one priority at the [facility], and we are thankful that you entrust us with your care.  Please help us ensure the best possible surgical and anesthetic outcome by sharing appropriate health information with our perioperative team and staff.  We look forward to taking great care of you!    Thank you for your time and consideration on this matter.    Alli Soares MD  Medical Director  Anesthesiologist  [facility]

## 2023-02-22 NOTE — ANESTHESIA PROCEDURE NOTES
Airway    Date/Time: 2/22/2023 11:40 AM  Performed by: Sorin Myrick D.O.  Authorized by: Sorin Myrick D.O.     Location:  OR  Urgency:  Elective  Indications for Airway Management:  Anesthesia      Spontaneous Ventilation: absent    Sedation Level:  Deep  Preoxygenated: Yes    Mask Difficulty Assessment:  2 - vent by mask + OA or adjuvant +/- NMBA  Final Airway Type:  Supraglottic airway  Final Supraglottic Airway:  Standard LMA    SGA Size:  4  Number of Attempts at Approach:  1

## 2023-02-22 NOTE — H&P
Surgery Orthopedic History & Physical Note    Date  2/22/2023    Primary Care Physician  HELEN Chung      Pre-Op Diagnosis Codes:     * Complex tear of medial meniscus of left knee as current injury [S83.232A]     * Ganglion of left knee [M67.462]    HPI  This is a 57 y.o. female whose had left knee pain for 6 to 7 years.  PT helped initially.  However, 2 months ago got much worse she could barely weight-bear.  She did get sent home from work.  She has occasional numbness on the lateral side of her thigh.  Ice and Advil really have not helped.  She works as an x-ray and CT tech at [employer]    Past Medical History:   Diagnosis Date    Anxiety     Arthritis     osteo    Complex tear of medial meniscus of right knee, initial encounter 09/28/2018    COVID-19 01/2021    Depression     Genital herpes     Has valtrex PRN    High cholesterol 2019    On a statin much lower now    Pain     Right knee    Prediabetes 09/2021       Past Surgical History:   Procedure Laterality Date    KNEE ARTHROSCOPY Right 09/28/2018    Procedure: KNEE ARTHROSCOPY;  Surgeon: Aubrey Noonan M.D.;  Location: [facility];  Service: Orthopedics    MENISCECTOMY, KNEE, MEDIAL Right 09/28/2018    Procedure: MEDIAL MENISCECTOMY - PARTIAL;  Surgeon: Aubrey Noonan M.D.;  Location: [facility];  Service: Orthopedics    CHONDROPLASTY Right 09/28/2018    Procedure: CHONDROPLASTY - AND GOMES;  Surgeon: Aubrey Noonan M.D.;  Location: [facility];  Service: Orthopedics    DENISE BY LAPAROSCOPY  2008    HAND SURGERY Right 2000    right thumb ligament reattachement    HYSTERECTOMY, VAGINAL  2000    TONSILLECTOMY  1966    GYN SURGERY  2003    Partial hysterectomy    OTHER  1999    Right thumb pinned after game keeper fracture    OTHER ABDOMINAL SURGERY  2001    Cholecystectomy       Current Facility-Administered Medications   Medication Dose Route Frequency Provider Last Rate Last Admin    ceFAZolin  (ANCEF) injection 2 g  2 g Intravenous Once Aubrey Noonan M.D.        lactated ringers infusion   Intravenous Continuous Aubrey Noonan M.D. 10 mL/hr at 02/22/23 0934 New Bag at 02/22/23 0934       Social History     Socioeconomic History    Marital status:      Spouse name: Not on file    Number of children: Not on file    Years of education: Not on file    Highest education level: Not on file   Occupational History    Not on file   Tobacco Use    Smoking status: Never    Smokeless tobacco: Never   Vaping Use    Vaping Use: Never used   Substance and Sexual Activity    Alcohol use: Not Currently     Comment: Nothing since 1/2021    Drug use: No    Sexual activity: Not Currently     Partners: Male     Birth control/protection: Other-See Comments     Comment: hysterectomy    Other Topics Concern    Not on file   Social History Narrative    Not on file     Social Determinants of Health     Financial Resource Strain: Not on file   Food Insecurity: Not on file   Transportation Needs: Not on file   Physical Activity: Not on file   Stress: Not on file   Social Connections: Not on file   Intimate Partner Violence: Not on file   Housing Stability: Not on file       Family History   Problem Relation Age of Onset    Clotting Disorder Sister         factor V    Heart Disease Sister         Heart murmur as a child    Thyroid Sister     Other Sister         parathyroidectomy     Kidney stones Sister     Hypertension Mother         Taking medication    Hypertension Father         Taking medication    Cancer Father         Prostate    Kidney stones Father     Diabetes Maternal Grandmother         Alcoholic    Cancer Maternal Grandmother         Abdominal    Alcohol abuse Maternal Grandmother     Alcohol abuse Maternal Grandfather     Diabetes Maternal Grandfather         Alcoholic    Other Maternal Grandfather         liver disease       Allergies  Codeine    Review of Systems  Negative    Physical Exam  The patient  appears their stated age.  HEENT is normal.  Respirations unlabored.  Musculoskeletal exam reveals tenderness on the medial joint line.  Positive Flynn's.  Good range of motion.  Stable ligaments.  Vital Signs  Blood Pressure: 126/63   Temperature: 36.2 °C (97.2 °F)   Pulse: 78   Respiration: 16   Pulse Oximetry: 93 %       Labs:                    Radiology:  Medial meniscus root tear with mil/mod chondromalacia Left knee      Assessment/Plan:  Pre-Op Diagnosis Codes:     * Complex tear of medial meniscus of left knee as current injury [S83.232A]     * Ganglion of left knee [M67.462]  Procedure(s):  Knee arthroscopy with medial meniscus root repair versus partial medial meniscectomy, chondroplasty, and ganglion cyst decompression, surgeries as indicated  EXCISION, GANGLION CYST

## 2023-02-22 NOTE — OP REPORT
DATE OF OPERATION: 2/22/2023    SURGEON: Aubrey Noonan MD     ASSISTANT: DA Lund    PREOPERATIVE DIAGNOSES:  1.  Left knee medial meniscus tear.  2.  Left knee chondromalacia.    POSTOPERATIVE DIAGNOSES:  1.  Left knee medial meniscus tear.  2.  Left knee lateral meniscus tear.  3.  Left knee chondromalacia.    PROCEDURE:  1.  Left knee arthroscopy with partial medial meniscectomy, partial lateral meniscectomy and chondroplasty.    ANESTHESIA: Sorin Myrick MD    ANESTHETIC: LMA anesthesia along with a local injection.    INDICATIONS: The patient has had knee pain for quite some time.  She has failed   nonoperative treatment and elected to proceed with operative intervention.  The patient  was explained the risks, benefits, alternatives, procedure and recovery in   detail. All questions were answered. Informed consent was obtained. Site   verification per protocol was obtained in the pre-op holding area and the operating   room. Patient received appropriate preoperative antibiotics.    OPERATION: After successful anesthesia, the patient's knee was examined. The patient  had a stable ligamentous exam and good range of motion. The leg was then prepped   and draped in the usual sterile fashion. An anterolateral portal was   established with a knife and blunt trocar into the suprapatellar pouch. The   suprapatellar pouch and the medial and lateral gutters were free of abnormalities except for some minor synovitis that was cleaned out with a shaver. The patella had some grade II chondromalacia on the central ridge. This was   gently debrided with a shaver from multiple portals. I then probed it and   felt the cartilage was stable. The patellofemoral joint otherwise tracked well. The   trochlea had some mild grade II chondromalacia as well.  This was smoothed out with a shaver from multiple portals.  The medial joint line had a complex mid body tear and a root tear of the medial meniscus.  Unfortunately after long  thought, I elected to do a partial medial meniscectomy.  I felt there was too much arthritis with some early grade 4 changes on the tibia and some grade 2 diffuse changes on the medial femoral condyle.  A partial medial meniscectomy was performed both with saul and   baskets from multiple portals. I then probed the meniscus and it felt stable. The   articular cartilage on the medial femoral condyle had some moderate grade II and grade III chondromalacia diffusely. This was  gently debrided with a shaver. The notch revealed a normal ACL and PCL to visual   inspection and probing. The lateral compartment had some chondromalacia on the lateral tibial plateau grade II.  This was smoothed out with a shaver.  The lateral meniscus had some central fraying that was smoothed out with a shaver and basket.  The lateral meniscus had good circumferential fibers and was stable to visual inspection and probing. At   that point, I was satisfied with the procedure. I lavaged out the joint,   suctioned out the fluid, closed the portals with 3-0 Prolene in an interrupted   fashion. Xeroform, 4x4s, and an ACE wrap were applied.   Patient was transferred to recovery room in stable condition.    ESTIMATED BLOOD LOSS: Minimal.    COMPLICATIONS: None.    DA Lund  was medically necessary for the case. They helped with   instrumentation, retraction, and positioning. Without their help, the case   would not have been as technically successful.        ____________________________________  MAHAMED FORDE MD

## 2023-03-30 DIAGNOSIS — E78.5 DYSLIPIDEMIA: ICD-10-CM

## 2023-03-30 RX ORDER — ROSUVASTATIN CALCIUM 10 MG/1
TABLET, COATED ORAL
Qty: 90 TABLET | Refills: 0 | Status: SHIPPED | OUTPATIENT
Start: 2023-03-30 | End: 2023-06-28

## 2023-03-30 NOTE — TELEPHONE ENCOUNTER
Requested Prescriptions     Pending Prescriptions Disp Refills   • rosuvastatin (CRESTOR) 10 MG Tab [Pharmacy Med Name: ROSUVASTATIN CALCIUM 10 MG TAB] 90 Tablet 0     Sig: TAKE 1 TABLET BY MOUTH EVERY DAY IN THE EVENING       PEGGY Champion.

## 2023-04-29 DIAGNOSIS — F41.9 ANXIETY AND DEPRESSION: ICD-10-CM

## 2023-04-29 DIAGNOSIS — F32.A ANXIETY AND DEPRESSION: ICD-10-CM

## 2023-05-01 RX ORDER — SERTRALINE HYDROCHLORIDE 100 MG/1
100 TABLET, FILM COATED ORAL DAILY
Qty: 90 TABLET | Refills: 3 | Status: SHIPPED | OUTPATIENT
Start: 2023-05-01 | End: 2023-10-30

## 2023-05-01 NOTE — TELEPHONE ENCOUNTER
Requested Prescriptions     Signed Prescriptions Disp Refills    sertraline (ZOLOFT) 100 MG Tab 90 Tablet 3     Sig: TAKE 1 TABLET BY MOUTH EVERY DAY     Authorizing Provider: MARIELLA MARK A.P.R.N.

## 2023-05-24 DIAGNOSIS — G89.29 CHRONIC BILATERAL THORACIC BACK PAIN: ICD-10-CM

## 2023-05-24 DIAGNOSIS — M54.6 CHRONIC BILATERAL THORACIC BACK PAIN: ICD-10-CM

## 2023-05-24 RX ORDER — TIZANIDINE HYDROCHLORIDE 2 MG/1
4 CAPSULE, GELATIN COATED ORAL 3 TIMES DAILY PRN
Qty: 30 CAPSULE | Refills: 2 | Status: SHIPPED | OUTPATIENT
Start: 2023-05-24

## 2023-05-24 NOTE — TELEPHONE ENCOUNTER
Requested Prescriptions     Signed Prescriptions Disp Refills    tizanidine (ZANAFLEX) 2 MG capsule 30 Capsule 2     Sig: TAKE 2 CAPSULES BY MOUTH 3 TIMES A DAY AS NEEDED (MUSCLE SPASM).     Authorizing Provider: MARIELLA MARK A.P.R.N.

## 2023-10-22 ENCOUNTER — HOSPITAL ENCOUNTER (OUTPATIENT)
Facility: MEDICAL CENTER | Age: 58
End: 2023-10-22
Attending: FAMILY MEDICINE
Payer: COMMERCIAL

## 2023-10-22 ENCOUNTER — OFFICE VISIT (OUTPATIENT)
Dept: URGENT CARE | Facility: PHYSICIAN GROUP | Age: 58
End: 2023-10-22
Payer: COMMERCIAL

## 2023-10-22 VITALS
WEIGHT: 202.82 LBS | RESPIRATION RATE: 16 BRPM | HEIGHT: 60 IN | BODY MASS INDEX: 39.82 KG/M2 | HEART RATE: 94 BPM | TEMPERATURE: 98 F | SYSTOLIC BLOOD PRESSURE: 106 MMHG | OXYGEN SATURATION: 95 % | DIASTOLIC BLOOD PRESSURE: 80 MMHG

## 2023-10-22 DIAGNOSIS — N30.01 ACUTE CYSTITIS WITH HEMATURIA: ICD-10-CM

## 2023-10-22 DIAGNOSIS — B37.9 ANTIBIOTIC-INDUCED YEAST INFECTION: ICD-10-CM

## 2023-10-22 DIAGNOSIS — T36.95XA ANTIBIOTIC-INDUCED YEAST INFECTION: ICD-10-CM

## 2023-10-22 LAB
APPEARANCE UR: NORMAL
BILIRUB UR STRIP-MCNC: NEGATIVE MG/DL
COLOR UR AUTO: YELLOW
GLUCOSE UR STRIP.AUTO-MCNC: NEGATIVE MG/DL
KETONES UR STRIP.AUTO-MCNC: NEGATIVE MG/DL
LEUKOCYTE ESTERASE UR QL STRIP.AUTO: NORMAL
NITRITE UR QL STRIP.AUTO: NEGATIVE
PH UR STRIP.AUTO: 7 [PH] (ref 5–8)
PROT UR QL STRIP: NORMAL MG/DL
RBC UR QL AUTO: NORMAL
SP GR UR STRIP.AUTO: 1.02
UROBILINOGEN UR STRIP-MCNC: NORMAL MG/DL

## 2023-10-22 PROCEDURE — 87186 SC STD MICRODIL/AGAR DIL: CPT

## 2023-10-22 PROCEDURE — 99213 OFFICE O/P EST LOW 20 MIN: CPT | Performed by: FAMILY MEDICINE

## 2023-10-22 PROCEDURE — 3079F DIAST BP 80-89 MM HG: CPT | Performed by: FAMILY MEDICINE

## 2023-10-22 PROCEDURE — 87086 URINE CULTURE/COLONY COUNT: CPT

## 2023-10-22 PROCEDURE — 3074F SYST BP LT 130 MM HG: CPT | Performed by: FAMILY MEDICINE

## 2023-10-22 PROCEDURE — 81002 URINALYSIS NONAUTO W/O SCOPE: CPT | Performed by: FAMILY MEDICINE

## 2023-10-22 PROCEDURE — 87077 CULTURE AEROBIC IDENTIFY: CPT

## 2023-10-22 RX ORDER — FLUCONAZOLE 150 MG/1
150 TABLET ORAL DAILY
Qty: 1 TABLET | Refills: 1 | Status: SHIPPED | OUTPATIENT
Start: 2023-10-22 | End: 2023-10-23

## 2023-10-22 RX ORDER — CIPROFLOXACIN 500 MG/1
500 TABLET, FILM COATED ORAL EVERY 12 HOURS
Qty: 14 TABLET | Refills: 0 | Status: SHIPPED | OUTPATIENT
Start: 2023-10-22 | End: 2023-10-29

## 2023-10-22 ASSESSMENT — ENCOUNTER SYMPTOMS: FEVER: 0

## 2023-10-22 ASSESSMENT — FIBROSIS 4 INDEX: FIB4 SCORE: 1.21

## 2023-10-22 NOTE — PROGRESS NOTES
Subjective:     ISABELLE WELLS is a 58 y.o. female who presents for UTI (X this morning she noticed blood in her urine with tiny clots. Also C/O frequency and urgency. )    HPI  Pt presents for evaluation of an acute problem   patient with some mild low back pain for the past few days  Initially thought that the pain was more from soreness from poor posture, but pain has been slowly worsening  Having some urgency and dysuria of the past day  Has noticed some gross hematuria and tiny blood clots  Felt feverish, but did not take her temperature  No nausea or vomiting  No history of recurrent urinary tract infections  Last UTI was about 30 years ago    Review of Systems   Constitutional:  Negative for fever.   Genitourinary:  Positive for dysuria, frequency, hematuria and urgency.       PMH:  has a past medical history of Anxiety, Arthritis, Complex tear of medial meniscus of right knee, initial encounter (09/28/2018), COVID-19 (01/2021), Depression, Genital herpes, High cholesterol (2019), Pain, and Prediabetes (09/2021).    She has no past medical history of Acute nasopharyngitis, Anesthesia, Anginal syndrome (HCC), Arrhythmia, Asthma, Blood clotting disorder (HCC), Bowel habit changes, Breath shortness, Bronchitis, Cancer (HCC), Carcinoma in situ of respiratory system, Cataract, Congestive heart failure (HCC), Continuous ambulatory peritoneal dialysis status (HCC), Coughing blood, Dental disorder, Diabetes (HCC), Dialysis patient (HCC), Disorder of thyroid, Emphysema of lung (HCC), Glaucoma, Gynecological disorder, Heart burn, Heart murmur, Heart valve disease, Hemorrhagic disorder (HCC), Hepatitis A, Hepatitis B, Hepatitis C, Hiatus hernia syndrome, Hypertension, Indigestion, Jaundice, Myocardial infarct (HCC), Pacemaker, Pneumonia, Pregnant, Renal disorder, Rheumatic fever, Seizure (HCC), Sleep apnea, Snoring, Stroke (HCC), Tuberculosis, Urinary bladder disorder, or Urinary incontinence.  MEDS:   Current  Outpatient Medications:     ciprofloxacin (CIPRO) 500 MG Tab, Take 1 Tablet by mouth every 12 hours for 7 days., Disp: 14 Tablet, Rfl: 0    fluconazole (DIFLUCAN) 150 MG tablet, Take 1 Tablet by mouth every day for 1 day., Disp: 1 Tablet, Rfl: 1    rosuvastatin (CRESTOR) 10 MG Tab, Take 1 Tablet by mouth every evening. Due for appointment with PCP for further refills, Disp: 90 Tablet, Rfl: 0    tizanidine (ZANAFLEX) 2 MG capsule, TAKE 2 CAPSULES BY MOUTH 3 TIMES A DAY AS NEEDED (MUSCLE SPASM)., Disp: 30 Capsule, Rfl: 2    sertraline (ZOLOFT) 100 MG Tab, TAKE 1 TABLET BY MOUTH EVERY DAY, Disp: 90 Tablet, Rfl: 3    cholecalciferol (D3 5000) 5000 UNIT Cap, Take 5,000 Units by mouth every day., Disp: , Rfl:     Multiple Vitamins-Minerals (MULTIVITAMIN GUMMIES WOMENS PO), Take  by mouth every day., Disp: , Rfl:     valacyclovir (VALTREX) 1 GM Tab, TAKE 0.5 TABS BY MOUTH 2 TIMES A DAY AS NEEDED., Disp: 90 Tablet, Rfl: 1  ALLERGIES:   Allergies   Allergen Reactions    Codeine Vomiting     SURGHX:   Past Surgical History:   Procedure Laterality Date    PB KNEE SCOPE,MED OR LAT MENIS REPAIR Left 2/22/2023    Procedure: Knee arthroscopy with  versus partial medial meniscectomy, chondroplasty;  Surgeon: Aubrey Noonan M.D.;  Location: [facility];  Service: Orthopedics    GANGLION EXCISION Left 2/22/2023    Procedure: EXCISION, GANGLION CYST;  Surgeon: Aubrey Noonan M.D.;  Location: [facility];  Service: Orthopedics    KNEE ARTHROSCOPY Right 09/28/2018    Procedure: KNEE ARTHROSCOPY;  Surgeon: Aubrey Noonan M.D.;  Location: [facility];  Service: Orthopedics    MENISCECTOMY, KNEE, MEDIAL Right 09/28/2018    Procedure: MEDIAL MENISCECTOMY - PARTIAL;  Surgeon: Aubrey Noonan M.D.;  Location: [facility];  Service: Orthopedics    CHONDROPLASTY Right 09/28/2018    Procedure: CHONDROPLASTY - AND GOMES;  Surgeon: Aubrey Noonan M.D.;  Location: [facility];  Service:  Orthopedics    DENISE BY LAPAROSCOPY  2008    HAND SURGERY Right 2000    right thumb ligament reattachement    HYSTERECTOMY, VAGINAL  2000    TONSILLECTOMY  1966    GYN SURGERY  2003    Partial hysterectomy    OTHER  1999    Right thumb pinned after game keeper fracture    OTHER ABDOMINAL SURGERY  2001    Cholecystectomy     SOCHX:  reports that she has never smoked. She has never used smokeless tobacco. She reports that she does not currently use alcohol. She reports that she does not use drugs.     Objective:   /80 (BP Location: Left arm, Patient Position: Sitting, BP Cuff Size: Adult long)   Pulse 94   Temp 36.7 °C (98 °F) (Temporal)   Resp 16   Ht 1.524 m (5')   Wt 92 kg (202 lb 13.2 oz)   SpO2 95%   BMI 39.61 kg/m²     Physical Exam  Constitutional:       General: She is not in acute distress.     Appearance: She is well-developed. She is not diaphoretic.   Pulmonary:      Effort: Pulmonary effort is normal.   Abdominal:      General: Abdomen is flat. Bowel sounds are normal. There is no distension.      Palpations: Abdomen is soft.      Tenderness: There is left CVA tenderness. There is no right CVA tenderness, guarding or rebound.      Comments: Mild tenderness in the left lower quadrant and suprapubic region   Neurological:      Mental Status: She is alert.         Assessment/Plan:   Assessment    1. Acute cystitis with hematuria  - POCT Urinalysis  - Urine Culture; Future  - ciprofloxacin (CIPRO) 500 MG Tab; Take 1 Tablet by mouth every 12 hours for 7 days.  Dispense: 14 Tablet; Refill: 0    2. Antibiotic-induced yeast infection  - fluconazole (DIFLUCAN) 150 MG tablet; Take 1 Tablet by mouth every day for 1 day.  Dispense: 1 Tablet; Refill: 1    Patient with acute cystitis.  She does have some flank tenderness concerning for early development of pyelonephritis.  Recommended treating cautiously and will start Cipro.  Send urine for culture to rule out resistant organism.  Given close ER  precautions if symptoms should worsen while on antibiotics.  Follow-up in the urgent care as needed.

## 2023-10-25 LAB
BACTERIA UR CULT: ABNORMAL
BACTERIA UR CULT: ABNORMAL
SIGNIFICANT IND 70042: ABNORMAL
SITE SITE: ABNORMAL
SOURCE SOURCE: ABNORMAL

## 2023-10-29 SDOH — ECONOMIC STABILITY: FOOD INSECURITY: WITHIN THE PAST 12 MONTHS, YOU WORRIED THAT YOUR FOOD WOULD RUN OUT BEFORE YOU GOT MONEY TO BUY MORE.: NEVER TRUE

## 2023-10-29 SDOH — ECONOMIC STABILITY: TRANSPORTATION INSECURITY
IN THE PAST 12 MONTHS, HAS THE LACK OF TRANSPORTATION KEPT YOU FROM MEDICAL APPOINTMENTS OR FROM GETTING MEDICATIONS?: NO

## 2023-10-29 SDOH — ECONOMIC STABILITY: TRANSPORTATION INSECURITY
IN THE PAST 12 MONTHS, HAS LACK OF TRANSPORTATION KEPT YOU FROM MEETINGS, WORK, OR FROM GETTING THINGS NEEDED FOR DAILY LIVING?: NO

## 2023-10-29 SDOH — ECONOMIC STABILITY: HOUSING INSECURITY: IN THE LAST 12 MONTHS, HOW MANY PLACES HAVE YOU LIVED?: 1

## 2023-10-29 SDOH — ECONOMIC STABILITY: HOUSING INSECURITY
IN THE LAST 12 MONTHS, WAS THERE A TIME WHEN YOU DID NOT HAVE A STEADY PLACE TO SLEEP OR SLEPT IN A SHELTER (INCLUDING NOW)?: NO

## 2023-10-29 SDOH — ECONOMIC STABILITY: INCOME INSECURITY: IN THE LAST 12 MONTHS, WAS THERE A TIME WHEN YOU WERE NOT ABLE TO PAY THE MORTGAGE OR RENT ON TIME?: NO

## 2023-10-29 SDOH — HEALTH STABILITY: PHYSICAL HEALTH: ON AVERAGE, HOW MANY DAYS PER WEEK DO YOU ENGAGE IN MODERATE TO STRENUOUS EXERCISE (LIKE A BRISK WALK)?: 2 DAYS

## 2023-10-29 SDOH — ECONOMIC STABILITY: FOOD INSECURITY: WITHIN THE PAST 12 MONTHS, THE FOOD YOU BOUGHT JUST DIDN'T LAST AND YOU DIDN'T HAVE MONEY TO GET MORE.: NEVER TRUE

## 2023-10-29 SDOH — HEALTH STABILITY: MENTAL HEALTH
STRESS IS WHEN SOMEONE FEELS TENSE, NERVOUS, ANXIOUS, OR CAN'T SLEEP AT NIGHT BECAUSE THEIR MIND IS TROUBLED. HOW STRESSED ARE YOU?: VERY MUCH

## 2023-10-29 SDOH — ECONOMIC STABILITY: INCOME INSECURITY: HOW HARD IS IT FOR YOU TO PAY FOR THE VERY BASICS LIKE FOOD, HOUSING, MEDICAL CARE, AND HEATING?: NOT HARD AT ALL

## 2023-10-29 SDOH — ECONOMIC STABILITY: TRANSPORTATION INSECURITY
IN THE PAST 12 MONTHS, HAS LACK OF RELIABLE TRANSPORTATION KEPT YOU FROM MEDICAL APPOINTMENTS, MEETINGS, WORK OR FROM GETTING THINGS NEEDED FOR DAILY LIVING?: NO

## 2023-10-29 SDOH — HEALTH STABILITY: PHYSICAL HEALTH: ON AVERAGE, HOW MANY MINUTES DO YOU ENGAGE IN EXERCISE AT THIS LEVEL?: 20 MIN

## 2023-10-29 ASSESSMENT — SOCIAL DETERMINANTS OF HEALTH (SDOH)
HOW OFTEN DO YOU ATTEND CHURCH OR RELIGIOUS SERVICES?: 1 TO 4 TIMES PER YEAR
IN A TYPICAL WEEK, HOW MANY TIMES DO YOU TALK ON THE PHONE WITH FAMILY, FRIENDS, OR NEIGHBORS?: THREE TIMES A WEEK
IN A TYPICAL WEEK, HOW MANY TIMES DO YOU TALK ON THE PHONE WITH FAMILY, FRIENDS, OR NEIGHBORS?: THREE TIMES A WEEK
HOW OFTEN DO YOU HAVE SIX OR MORE DRINKS ON ONE OCCASION: NEVER
HOW OFTEN DO YOU GET TOGETHER WITH FRIENDS OR RELATIVES?: MORE THAN THREE TIMES A WEEK
HOW HARD IS IT FOR YOU TO PAY FOR THE VERY BASICS LIKE FOOD, HOUSING, MEDICAL CARE, AND HEATING?: NOT HARD AT ALL
HOW OFTEN DO YOU ATTEND CHURCH OR RELIGIOUS SERVICES?: 1 TO 4 TIMES PER YEAR
HOW OFTEN DO YOU GET TOGETHER WITH FRIENDS OR RELATIVES?: MORE THAN THREE TIMES A WEEK
HOW OFTEN DO YOU HAVE A DRINK CONTAINING ALCOHOL: NEVER
HOW OFTEN DO YOU ATTENT MEETINGS OF THE CLUB OR ORGANIZATION YOU BELONG TO?: PATIENT DECLINED
WITHIN THE PAST 12 MONTHS, YOU WORRIED THAT YOUR FOOD WOULD RUN OUT BEFORE YOU GOT THE MONEY TO BUY MORE: NEVER TRUE
HOW OFTEN DO YOU ATTENT MEETINGS OF THE CLUB OR ORGANIZATION YOU BELONG TO?: PATIENT DECLINED
DO YOU BELONG TO ANY CLUBS OR ORGANIZATIONS SUCH AS CHURCH GROUPS UNIONS, FRATERNAL OR ATHLETIC GROUPS, OR SCHOOL GROUPS?: NO
DO YOU BELONG TO ANY CLUBS OR ORGANIZATIONS SUCH AS CHURCH GROUPS UNIONS, FRATERNAL OR ATHLETIC GROUPS, OR SCHOOL GROUPS?: NO
HOW MANY DRINKS CONTAINING ALCOHOL DO YOU HAVE ON A TYPICAL DAY WHEN YOU ARE DRINKING: PATIENT DOES NOT DRINK

## 2023-10-29 ASSESSMENT — LIFESTYLE VARIABLES
SKIP TO QUESTIONS 9-10: 1
HOW OFTEN DO YOU HAVE SIX OR MORE DRINKS ON ONE OCCASION: NEVER
HOW MANY STANDARD DRINKS CONTAINING ALCOHOL DO YOU HAVE ON A TYPICAL DAY: PATIENT DOES NOT DRINK
HOW OFTEN DO YOU HAVE A DRINK CONTAINING ALCOHOL: NEVER
AUDIT-C TOTAL SCORE: 0

## 2023-10-30 ENCOUNTER — OFFICE VISIT (OUTPATIENT)
Dept: MEDICAL GROUP | Facility: PHYSICIAN GROUP | Age: 58
End: 2023-10-30
Payer: COMMERCIAL

## 2023-10-30 VITALS
HEART RATE: 76 BPM | HEIGHT: 60 IN | DIASTOLIC BLOOD PRESSURE: 68 MMHG | WEIGHT: 203 LBS | SYSTOLIC BLOOD PRESSURE: 102 MMHG | OXYGEN SATURATION: 97 % | TEMPERATURE: 97 F | BODY MASS INDEX: 39.85 KG/M2

## 2023-10-30 DIAGNOSIS — F41.9 ANXIETY AND DEPRESSION: ICD-10-CM

## 2023-10-30 DIAGNOSIS — F32.A ANXIETY AND DEPRESSION: ICD-10-CM

## 2023-10-30 DIAGNOSIS — E66.9 OBESITY (BMI 30-39.9): ICD-10-CM

## 2023-10-30 DIAGNOSIS — E78.5 DYSLIPIDEMIA: ICD-10-CM

## 2023-10-30 DIAGNOSIS — R73.03 PREDIABETES: ICD-10-CM

## 2023-10-30 DIAGNOSIS — Z01.84 IMMUNITY STATUS TESTING: ICD-10-CM

## 2023-10-30 DIAGNOSIS — Z23 NEED FOR VACCINATION: ICD-10-CM

## 2023-10-30 DIAGNOSIS — Z11.4 SCREENING FOR HIV (HUMAN IMMUNODEFICIENCY VIRUS): ICD-10-CM

## 2023-10-30 DIAGNOSIS — E55.9 VITAMIN D DEFICIENCY: ICD-10-CM

## 2023-10-30 DIAGNOSIS — Z00.00 ANNUAL VISIT FOR GENERAL ADULT MEDICAL EXAMINATION WITHOUT ABNORMAL FINDINGS: ICD-10-CM

## 2023-10-30 PROCEDURE — 99396 PREV VISIT EST AGE 40-64: CPT | Mod: 25 | Performed by: NURSE PRACTITIONER

## 2023-10-30 PROCEDURE — 90686 IIV4 VACC NO PRSV 0.5 ML IM: CPT | Performed by: NURSE PRACTITIONER

## 2023-10-30 PROCEDURE — 3074F SYST BP LT 130 MM HG: CPT | Performed by: NURSE PRACTITIONER

## 2023-10-30 PROCEDURE — 3078F DIAST BP <80 MM HG: CPT | Performed by: NURSE PRACTITIONER

## 2023-10-30 PROCEDURE — 90471 IMMUNIZATION ADMIN: CPT | Performed by: NURSE PRACTITIONER

## 2023-10-30 PROCEDURE — 99214 OFFICE O/P EST MOD 30 MIN: CPT | Mod: 25 | Performed by: NURSE PRACTITIONER

## 2023-10-30 ASSESSMENT — PATIENT HEALTH QUESTIONNAIRE - PHQ9: CLINICAL INTERPRETATION OF PHQ2 SCORE: 0

## 2023-10-30 ASSESSMENT — FIBROSIS 4 INDEX: FIB4 SCORE: 1.21

## 2023-10-30 NOTE — PROGRESS NOTES
Subjective:     CC:   Chief Complaint   Patient presents with    Annual Exam    Requesting Labs     Iron        HPI:   ISABELLE WELLS is a 58 y.o. female who presents for annual exam. She is feeling well and denies any complaints.    Anxiety and depression  PHQ score today 0.  Continues sertraline 100 mg daily.  She would like to discuss decreasing her dose. During COVID her dose increased as her work colleage passed away.  Denies any self-harm or SI today.    Dyslipidemia  Due for updated labs.  Continues rosuvastatin 10 mg every evening. She wants to see if she can decrease her rosuvastatin dose.     Prediabetes  Due for updated A1c.  She has made diet changes.      Ob-Gyn/ History:    Patient has GYN provider: no  /Para:    Last Pap Smear:  2016. No history of abnormal pap smears.  Gyn Surgery:  vaginal hysterectomy .  Current Contraceptive Method:  hysterectomy. She is not currently sexually active.  No significant bloating/fluid retention, pelvic pain, or dyspareunia. No vaginal discharge. Some vaginal itching, recent UTI with antibiotic treatment.   Post-menopausal bleeding: none  Urinary incontinence: none  Folate intake:  not taking    Health Maintenance  PT/vit D for falls prevention: Taking daily multivitamin and daily vitamin D.    Cholesterol Screening: due for updated labs   Diabetes Screening: Due for updated labs   Diet: She is eating more fruits, vegetables, cut back on red meat and eating more chicken. Regular Coke, decreased from daily down to small can daily.    Exercise: She is walking. Continues to have knee problems but this is improving. She has a recumbent/elliptical bicycle that she will use.   Substance Abuse: Discussed and reviewed.    Not in relationship.  Seat belts safety discussed.  Sun protection used.    Cancer screening  Colorectal Cancer Screenin2016 with 10 year recall   Lung Cancer Screening: n/a    Cervical Cancer Screening: n/a hysterectomy    Breast Cancer Screenin2023, continue Q2 year screening     Infectious disease screening/Immunizations  --STI Screening: No   --Practices safe sex.  --HIV Screening: Ordered   --Hepatitis C Screenin2021   --Immunizations:    Influenza: completing today   Tetanus: 2015    Shingles: Completed course   Pneumococcal : N/A   Other immunizations: COVID vaccine up-to-date.  Checking hepatitis B immunity     She  has a past medical history of Anxiety, Arthritis, Complex tear of medial meniscus of right knee, initial encounter (2018), COVID-19 (2021), Depression, Genital herpes, High cholesterol (), Pain, and Prediabetes (2021).    She has no past medical history of Acute nasopharyngitis, Anesthesia, Anginal syndrome (HCC), Arrhythmia, Asthma, Blood clotting disorder (HCC), Bowel habit changes, Breath shortness, Bronchitis, Cancer (HCC), Carcinoma in situ of respiratory system, Cataract, Congestive heart failure (HCC), Continuous ambulatory peritoneal dialysis status (HCC), Coughing blood, Dental disorder, Diabetes (HCC), Dialysis patient (HCC), Disorder of thyroid, Emphysema of lung (HCC), Glaucoma, Gynecological disorder, Heart burn, Heart murmur, Heart valve disease, Hemorrhagic disorder (HCC), Hepatitis A, Hepatitis B, Hepatitis C, Hiatus hernia syndrome, Hypertension, Indigestion, Jaundice, Myocardial infarct (HCC), Pacemaker, Pneumonia, Pregnant, Renal disorder, Rheumatic fever, Seizure (HCC), Sleep apnea, Snoring, Stroke (HCC), Tuberculosis, Urinary bladder disorder, or Urinary incontinence.  She  has a past surgical history that includes tonsillectomy (1966); hand surgery (Right, ); hysterectomy, vaginal (); debi by laparoscopy (); knee arthroscopy (Right, 2018); meniscectomy, knee, medial (Right, 2018); chondroplasty (Right, 2018); other abdominal surgery (); gyn surgery (); other (); pr knee scope,med or lat menis repair (Left,  2/22/2023); and ganglion excision (Left, 2/22/2023).    Family History   Problem Relation Age of Onset    Clotting Disorder Sister         factor V    Heart Disease Sister         Heart murmur as a child    Thyroid Sister     Other Sister         parathyroidectomy     Kidney stones Sister     Hypertension Mother         Taking medication    Hypertension Father         Taking medication    Cancer Father         Prostate    Kidney stones Father     Diabetes Maternal Grandmother         Alcoholic    Cancer Maternal Grandmother         Abdominal    Alcohol abuse Maternal Grandmother     Alcohol abuse Maternal Grandfather     Diabetes Maternal Grandfather         Alcoholic    Other Maternal Grandfather         liver disease       Social History     Socioeconomic History    Marital status:      Spouse name: Not on file    Number of children: Not on file    Years of education: Not on file    Highest education level: Associate degree: occupational, technical, or vocational program   Occupational History    Not on file   Tobacco Use    Smoking status: Never    Smokeless tobacco: Never   Vaping Use    Vaping Use: Never used   Substance and Sexual Activity    Alcohol use: Not Currently     Comment: Nothing since 1/2021    Drug use: No    Sexual activity: Not Currently     Partners: Male     Birth control/protection: Other-See Comments     Comment: hysterectomy    Other Topics Concern    Not on file   Social History Narrative    Not on file     Social Determinants of Health     Financial Resource Strain: Low Risk  (10/29/2023)    Overall Financial Resource Strain (CARDIA)     Difficulty of Paying Living Expenses: Not hard at all   Food Insecurity: No Food Insecurity (10/29/2023)    Hunger Vital Sign     Worried About Running Out of Food in the Last Year: Never true     Ran Out of Food in the Last Year: Never true   Transportation Needs: No Transportation Needs (10/29/2023)    PRAPARE - Transportation     Lack of  Transportation (Medical): No     Lack of Transportation (Non-Medical): No   Physical Activity: Insufficiently Active (10/29/2023)    Exercise Vital Sign     Days of Exercise per Week: 2 days     Minutes of Exercise per Session: 20 min   Stress: Stress Concern Present (10/29/2023)    [employer] [place] - Occupational Stress Questionnaire     Feeling of Stress : Very much   Social Connections: Moderately Isolated (10/29/2023)    Social Connection and Isolation Panel [NHANES]     Frequency of Communication with Friends and Family: Three times a week     Frequency of Social Gatherings with Friends and Family: More than three times a week     Attends Sabianist Services: 1 to 4 times per year     Active Member of Clubs or Organizations: No     Attends Club or Organization Meetings: Patient refused     Marital Status:    Intimate Partner Violence: Not on file   Housing Stability: Low Risk  (10/29/2023)    Housing Stability Vital Sign     Unable to Pay for Housing in the Last Year: No     Number of Places Lived in the Last Year: 1     Unstable Housing in the Last Year: No       Patient Active Problem List    Diagnosis Date Noted    Obesity (BMI 30-39.9) 10/30/2023    Complex tear of medial meniscus of left knee as current injury 01/19/2023    Ganglion of left knee 01/19/2023    Plantar fasciitis 10/04/2022    Morbid obesity with BMI of 40.0-44.9, adult (HCC) 10/04/2022    Chronic bilateral back pain 02/09/2022    Elevated alkaline phosphatase level 09/09/2021    Vitamin D deficiency 01/11/2021    Prediabetes 01/11/2021    Dyslipidemia 01/11/2021    Cervical radiculopathy 10/08/2019    HSV infection 02/11/2019    Anxiety and depression 02/11/2019         Current Outpatient Medications   Medication Sig Dispense Refill    sertraline (ZOLOFT) 50 MG Tab Take 1 Tablet by mouth every day. 30 Tablet 2    rosuvastatin (CRESTOR) 10 MG Tab Take 1 Tablet by mouth every evening. Due for appointment with PCP  for further refills 90 Tablet 0    tizanidine (ZANAFLEX) 2 MG capsule TAKE 2 CAPSULES BY MOUTH 3 TIMES A DAY AS NEEDED (MUSCLE SPASM). 30 Capsule 2    cholecalciferol (D3 5000) 5000 UNIT Cap Take 5,000 Units by mouth every day.      Multiple Vitamins-Minerals (MULTIVITAMIN GUMMIES WOMENS PO) Take  by mouth every day.      valacyclovir (VALTREX) 1 GM Tab TAKE 0.5 TABS BY MOUTH 2 TIMES A DAY AS NEEDED. 90 Tablet 1     No current facility-administered medications for this visit.     Allergies   Allergen Reactions    Codeine Vomiting       Review of Systems   Constitutional: Negative for fever, chills and malaise/fatigue.   HENT: Negative for congestion.    Eyes: Negative for pain.   Respiratory: Negative for cough and shortness of breath.    Cardiovascular: Negative for leg swelling.   Gastrointestinal: Negative for nausea, vomiting, abdominal pain and diarrhea.   Genitourinary: Negative for dysuria and hematuria.   Skin: Negative for rash.   Neurological: Negative for dizziness, focal weakness and headaches.   Endo/Heme/Allergies: Does not bruise/bleed easily.   Psychiatric/Behavioral: Negative for depression.  The patient is not nervous/anxious.      Objective:     Vital signs reviewed  /68 (BP Location: Left arm, Patient Position: Sitting, BP Cuff Size: Adult)   Pulse 76   Temp 36.1 °C (97 °F) (Temporal)   Ht 1.524 m (5')   Wt 92.1 kg (203 lb)   SpO2 97%   BMI 39.65 kg/m²   Body mass index is 39.65 kg/m².  Wt Readings from Last 4 Encounters:   10/30/23 92.1 kg (203 lb)   10/22/23 92 kg (202 lb 13.2 oz)   05/04/23 92.1 kg (203 lb)   04/06/23 92.1 kg (203 lb)       Physical Exam:  Constitutional: Well-developed and well-nourished. Not diaphoretic. No distress.   Skin: Skin is warm and dry. No rash noted.  Head: Atraumatic without lesions.  Eyes: Conjunctivae and extraocular motions are normal. Pupils are equal, round, and reactive to light. No scleral icterus.  Wears glasses.  Ears:  External ears  unremarkable. Tympanic membranes clear and intact.  Nose: Nares patent. Septum midline. Turbinates without erythema nor edema. No discharge.   Mouth/Throat: Dentition is intact. Tongue normal. Oropharynx is clear and moist. Posterior pharynx without erythema or exudates.  Neck: Supple, trachea midline. Normal range of motion. No lymphadenopathy--cervical or supraclavicular.  Cardiovascular: Regular rate and rhythm, S1 and S2 without murmur, rubs, or gallops.  Lungs: Normal inspiratory effort, CTA bilaterally, no wheezes/rhonchi/rales  Abdomen: Soft, non tender, and without distention. Active bowel sounds in all four quadrants. No rebound, guarding, masses or HSM.  Increased body habitus.  Extremities: No cyanosis, clubbing, erythema, nor edema.   Musculoskeletal: All major joints AROM full in all directions without pain.  Neurological: Alert and oriented x 3.   Psychiatric:  Behavior, mood, and affect are appropriate.        Assessment and Plan:     1. Annual visit for general adult medical examination without abnormal findings  Acute uncomplicated problem.  Annual exam completed today. Discussion today about general wellness and lifestyle habits:  Engage in regular physical and social activities  Skin care, including sunscreen  Recommended annual eye exams and annual dental exams  Discussed wearing seatbelt when in car at all times     2. Anxiety and depression  Chronic exacerbated problem.  Discussed and reviewed her PHQ score today.  We will decrease her sertraline dose down to 50 mg daily.  We discussed follow-up in next 6-8 weeks on the decreased dose.  Check updated labs.  Differential diagnosis includes thyroid dysfunction, iron deficiency, vitamin deficiency.  - TSH WITH REFLEX TO FT4; Future  - VITAMIN B12; Future  - FOLATE; Future  - IRON/TOTAL IRON BIND; Future  - FERRITIN; Future  - sertraline (ZOLOFT) 50 MG Tab; Take 1 Tablet by mouth every day.  Dispense: 30 Tablet; Refill: 2    3.  Dyslipidemia  Chronic stable problem.  Check updated labs.  Continue rosuvastatin 10 mg every evening.  - CBC WITH DIFFERENTIAL; Future  - Comp Metabolic Panel; Future  - Lipid Profile; Future    4. Prediabetes  Chronic stable problem.  Check updated labs.  - HEMOGLOBIN A1C; Future    5. Obesity (BMI 30-39.9)  Chronic stable problem.  BMI has improved.  Continue working on diet and increasing exercise.  - Patient identified as having weight management issue.  Appropriate orders and counseling given.    6. Vitamin D deficiency  Chronic stable problem.  Due for updated labs.  Continue daily supplementation.  - VITAMIN D,25 HYDROXY (DEFICIENCY); Future    7. Immunity status testing  Acute uncomplicated problem.  Discussed checking immunity status and she is in agreement.  - HEP B SURFACE AB; Future    8. Screening for HIV (human immunodeficiency virus)  Acute uncomplicated problem.  Discussed screening and she is in agreement.  - HIV AG/AB COMBO ASSAY SCREENING; Future    9. Need for vaccination  Acute complicated problem.  She would like her influenza vaccine today. I have placed the below orders and discussed them with an approved delegating provider. The MA is performing the below orders under the direction of Dr. Segura.  - INFLUENZA VACCINE QUAD INJ (PF)      HCM: Influenza vaccine completed today.  Labs per orders  Immunizations per orders  Patient counseled about skin care, diet, supplements, prenatal vitamins, safe sex and exercise.    Follow-up: Return for Labs.      Please note that this dictation was created using voice recognition software. I have made every reasonable attempt to correct obvious errors, but I expect that there are errors of grammar and possibly content that I did not discover before finalizing the note.

## 2023-10-30 NOTE — ASSESSMENT & PLAN NOTE
PHQ score today 0.  Continues sertraline 100 mg daily.  She would like to discuss decreasing her dose. During COVID her dose increased as her work colleage passed away.  Denies any self-harm or SI today.

## 2023-10-30 NOTE — ASSESSMENT & PLAN NOTE
Due for updated labs.  Continues rosuvastatin 10 mg every evening. She wants to see if she can decrease her rosuvastatin dose.

## 2023-10-31 ENCOUNTER — HOSPITAL ENCOUNTER (OUTPATIENT)
Dept: LAB | Facility: MEDICAL CENTER | Age: 58
End: 2023-10-31
Attending: NURSE PRACTITIONER
Payer: COMMERCIAL

## 2023-10-31 DIAGNOSIS — F41.9 ANXIETY AND DEPRESSION: ICD-10-CM

## 2023-10-31 DIAGNOSIS — E55.9 VITAMIN D DEFICIENCY: ICD-10-CM

## 2023-10-31 DIAGNOSIS — E78.5 DYSLIPIDEMIA: ICD-10-CM

## 2023-10-31 DIAGNOSIS — F32.A ANXIETY AND DEPRESSION: ICD-10-CM

## 2023-10-31 DIAGNOSIS — Z11.4 SCREENING FOR HIV (HUMAN IMMUNODEFICIENCY VIRUS): ICD-10-CM

## 2023-10-31 DIAGNOSIS — Z01.84 IMMUNITY STATUS TESTING: ICD-10-CM

## 2023-10-31 DIAGNOSIS — R73.03 PREDIABETES: ICD-10-CM

## 2023-10-31 LAB
25(OH)D3 SERPL-MCNC: 25 NG/ML (ref 30–100)
ALBUMIN SERPL BCP-MCNC: 4.8 G/DL (ref 3.2–4.9)
ALBUMIN/GLOB SERPL: 1.8 G/DL
ALP SERPL-CCNC: 106 U/L (ref 30–99)
ALT SERPL-CCNC: 44 U/L (ref 2–50)
ANION GAP SERPL CALC-SCNC: 11 MMOL/L (ref 7–16)
AST SERPL-CCNC: 59 U/L (ref 12–45)
BASOPHILS # BLD AUTO: 0.9 % (ref 0–1.8)
BASOPHILS # BLD: 0.06 K/UL (ref 0–0.12)
BILIRUB SERPL-MCNC: 0.4 MG/DL (ref 0.1–1.5)
BUN SERPL-MCNC: 13 MG/DL (ref 8–22)
CALCIUM ALBUM COR SERPL-MCNC: 9.1 MG/DL (ref 8.5–10.5)
CALCIUM SERPL-MCNC: 9.7 MG/DL (ref 8.5–10.5)
CHLORIDE SERPL-SCNC: 105 MMOL/L (ref 96–112)
CHOLEST SERPL-MCNC: 177 MG/DL (ref 100–199)
CO2 SERPL-SCNC: 25 MMOL/L (ref 20–33)
CREAT SERPL-MCNC: 0.79 MG/DL (ref 0.5–1.4)
EOSINOPHIL # BLD AUTO: 0.29 K/UL (ref 0–0.51)
EOSINOPHIL NFR BLD: 4.6 % (ref 0–6.9)
ERYTHROCYTE [DISTWIDTH] IN BLOOD BY AUTOMATED COUNT: 45.1 FL (ref 35.9–50)
EST. AVERAGE GLUCOSE BLD GHB EST-MCNC: 134 MG/DL
FASTING STATUS PATIENT QL REPORTED: NORMAL
FERRITIN SERPL-MCNC: 272 NG/ML (ref 10–291)
FOLATE SERPL-MCNC: 18.1 NG/ML
GFR SERPLBLD CREATININE-BSD FMLA CKD-EPI: 86 ML/MIN/1.73 M 2
GLOBULIN SER CALC-MCNC: 2.6 G/DL (ref 1.9–3.5)
GLUCOSE SERPL-MCNC: 98 MG/DL (ref 65–99)
HBA1C MFR BLD: 6.3 % (ref 4–5.6)
HBV SURFACE AB SERPL IA-ACNC: 528 MIU/ML (ref 0–10)
HCT VFR BLD AUTO: 47 % (ref 37–47)
HDLC SERPL-MCNC: 38 MG/DL
HGB BLD-MCNC: 15.1 G/DL (ref 12–16)
HIV 1+2 AB+HIV1 P24 AG SERPL QL IA: NORMAL
IMM GRANULOCYTES # BLD AUTO: 0.03 K/UL (ref 0–0.11)
IMM GRANULOCYTES NFR BLD AUTO: 0.5 % (ref 0–0.9)
IRON SATN MFR SERPL: 27 % (ref 15–55)
IRON SERPL-MCNC: 87 UG/DL (ref 40–170)
LDLC SERPL CALC-MCNC: 114 MG/DL
LYMPHOCYTES # BLD AUTO: 1.47 K/UL (ref 1–4.8)
LYMPHOCYTES NFR BLD: 23.2 % (ref 22–41)
MCH RBC QN AUTO: 30.9 PG (ref 27–33)
MCHC RBC AUTO-ENTMCNC: 32.1 G/DL (ref 32.2–35.5)
MCV RBC AUTO: 96.1 FL (ref 81.4–97.8)
MONOCYTES # BLD AUTO: 0.43 K/UL (ref 0–0.85)
MONOCYTES NFR BLD AUTO: 6.8 % (ref 0–13.4)
NEUTROPHILS # BLD AUTO: 4.06 K/UL (ref 1.82–7.42)
NEUTROPHILS NFR BLD: 64 % (ref 44–72)
NRBC # BLD AUTO: 0 K/UL
NRBC BLD-RTO: 0 /100 WBC (ref 0–0.2)
PLATELET # BLD AUTO: 320 K/UL (ref 164–446)
PMV BLD AUTO: 9.3 FL (ref 9–12.9)
POTASSIUM SERPL-SCNC: 4.5 MMOL/L (ref 3.6–5.5)
PROT SERPL-MCNC: 7.4 G/DL (ref 6–8.2)
RBC # BLD AUTO: 4.89 M/UL (ref 4.2–5.4)
SODIUM SERPL-SCNC: 141 MMOL/L (ref 135–145)
TIBC SERPL-MCNC: 325 UG/DL (ref 250–450)
TRIGL SERPL-MCNC: 124 MG/DL (ref 0–149)
TSH SERPL DL<=0.005 MIU/L-ACNC: 1.73 UIU/ML (ref 0.38–5.33)
UIBC SERPL-MCNC: 238 UG/DL (ref 110–370)
VIT B12 SERPL-MCNC: 783 PG/ML (ref 211–911)
WBC # BLD AUTO: 6.3 K/UL (ref 4.8–10.8)

## 2023-10-31 PROCEDURE — 83550 IRON BINDING TEST: CPT

## 2023-10-31 PROCEDURE — 80061 LIPID PANEL: CPT

## 2023-10-31 PROCEDURE — 36415 COLL VENOUS BLD VENIPUNCTURE: CPT

## 2023-10-31 PROCEDURE — 82607 VITAMIN B-12: CPT

## 2023-10-31 PROCEDURE — 86706 HEP B SURFACE ANTIBODY: CPT

## 2023-10-31 PROCEDURE — 85025 COMPLETE CBC W/AUTO DIFF WBC: CPT

## 2023-10-31 PROCEDURE — 87389 HIV-1 AG W/HIV-1&-2 AB AG IA: CPT

## 2023-10-31 PROCEDURE — 83540 ASSAY OF IRON: CPT

## 2023-10-31 PROCEDURE — 84443 ASSAY THYROID STIM HORMONE: CPT

## 2023-10-31 PROCEDURE — 83036 HEMOGLOBIN GLYCOSYLATED A1C: CPT

## 2023-10-31 PROCEDURE — 82306 VITAMIN D 25 HYDROXY: CPT

## 2023-10-31 PROCEDURE — 82746 ASSAY OF FOLIC ACID SERUM: CPT

## 2023-10-31 PROCEDURE — 80053 COMPREHEN METABOLIC PANEL: CPT

## 2023-10-31 PROCEDURE — 82728 ASSAY OF FERRITIN: CPT

## 2023-11-02 ENCOUNTER — OFFICE VISIT (OUTPATIENT)
Dept: MEDICAL GROUP | Facility: PHYSICIAN GROUP | Age: 58
End: 2023-11-02
Payer: COMMERCIAL

## 2023-11-02 VITALS
TEMPERATURE: 97.3 F | BODY MASS INDEX: 40.05 KG/M2 | DIASTOLIC BLOOD PRESSURE: 60 MMHG | HEIGHT: 60 IN | WEIGHT: 204 LBS | OXYGEN SATURATION: 95 % | SYSTOLIC BLOOD PRESSURE: 118 MMHG | HEART RATE: 77 BPM

## 2023-11-02 DIAGNOSIS — R74.8 ELEVATED ALKALINE PHOSPHATASE LEVEL: ICD-10-CM

## 2023-11-02 DIAGNOSIS — E78.5 DYSLIPIDEMIA: ICD-10-CM

## 2023-11-02 DIAGNOSIS — R73.03 PREDIABETES: ICD-10-CM

## 2023-11-02 DIAGNOSIS — G47.9 DIFFICULTY SLEEPING: ICD-10-CM

## 2023-11-02 DIAGNOSIS — R25.1 TREMOR OF BOTH HANDS: ICD-10-CM

## 2023-11-02 DIAGNOSIS — R06.83 SNORING: ICD-10-CM

## 2023-11-02 DIAGNOSIS — E55.9 VITAMIN D DEFICIENCY: ICD-10-CM

## 2023-11-02 PROBLEM — G47.09 OTHER INSOMNIA: Status: ACTIVE | Noted: 2023-11-02

## 2023-11-02 PROBLEM — E66.01 MORBID OBESITY WITH BMI OF 40.0-44.9, ADULT (HCC): Status: RESOLVED | Noted: 2022-10-04 | Resolved: 2023-11-02

## 2023-11-02 PROCEDURE — 99214 OFFICE O/P EST MOD 30 MIN: CPT | Performed by: NURSE PRACTITIONER

## 2023-11-02 PROCEDURE — 3074F SYST BP LT 130 MM HG: CPT | Performed by: NURSE PRACTITIONER

## 2023-11-02 PROCEDURE — 3078F DIAST BP <80 MM HG: CPT | Performed by: NURSE PRACTITIONER

## 2023-11-02 RX ORDER — FLUCONAZOLE 150 MG/1
TABLET ORAL
COMMUNITY
Start: 2023-10-31 | End: 2024-01-10

## 2023-11-02 RX ORDER — TRAZODONE HYDROCHLORIDE 50 MG/1
50 TABLET ORAL NIGHTLY PRN
Qty: 30 TABLET | Refills: 2 | Status: SHIPPED | OUTPATIENT
Start: 2023-11-02 | End: 2024-01-31

## 2023-11-02 ASSESSMENT — FIBROSIS 4 INDEX: FIB4 SCORE: 1.64

## 2023-11-02 NOTE — ASSESSMENT & PLAN NOTE
The tremors started in high school due to hypoglycemia. Worsened 18 months ago and worse after COVID infection from 1 year ago. Some days has slight tremor and others worse that she can not start IV. Aggravating factors include not sleeping. Alleviating factors include sleep. We have decreased her sertraline dose to 50 mg and notes some improvement.  She has been on sertraline for years.  No hand weakness.  Notes wearing a brace does provide some relief.

## 2023-11-02 NOTE — PATIENT INSTRUCTIONS
SLEEP STUDY INSTRUCTIONS    1. Our main concern is to provide the best test and evaluation of your sleep and your cooperation in following the guidelines is very necessary.    2. We have no facilities for family members or guests at the sleep center. Special arrangements will be made for children requiring overnight sleep studies.    3. Unless otherwise instructed, AVOID alcoholic beverages on the day of your sleep study.    4. DO NOT drink coffee or caffeine-containing beverages after 12:00 noon on the day of your sleep study.    5. There is NO smoking at the sleep center.    6. Try to maintain a usual daytime schedule prior to the study (avoid unusual physical activity or meals).    7. DO NOT take a nap on the day of your study.    8. This is an outpatient procedure (test); therefore, nursing services, medications, and meals ARE NOT provided. If you take medications, bring them with you and take them on the schedule you do at home.    9. Please fill your sleep aid prescription (Ambien or Lunesta) and bring to your sleep study. Even patients who normally have no problem going to sleep often need a sleep aid in this different environment.    10. We ask that you wear conventional sleep attire (pajamas or sweats) for the sleep study. We discourage patients from wearing only their underwear to bed. We recommend two-piece pajamas as the techs will need to apply sensors to your stomach.    11. Please shampoo your hair the day of the sleep study. Please DO NOT use any other hair or skin products before your arrival (e.g., mousse, gel, hair or body spray, perfume, body lotion etc.) NOTE: Women should not wear heavy makeup prior to arrival as some wires are taped to the face area.    12. The technician will be applying several small electrodes to the scalp, eye area, chin, chest, and legs, plus respiratory effort belts around the chest. Also, there will be a device placed directly under the nose. (THIS WILL NOT OBSTRUCT  YOUR BREATHING.) This is a painless procedure and the skin is not broken.    13. The test is generally completed in six to eight hours; We are usually done between 6 - 7 a.m., unless you are scheduled for a nap study. You may need to come back another night for a second study to complete your treatment plan.    14. Patients who are scheduled for an MSLT (nap study) will stay at the sleep center for the day following their nighttime study. You will be notified if a nap study was ordered for you at the time the night study is scheduled. Generally, patients having a nap study will leave the sleep center by 4 p.m.    15. You will need to bring food for the following day if you are scheduled for a nap study. A refrigerator and microwave are available.    16. A bathroom is available for your use.    17. We are able to adjust the room temperature for your comfort. Please let the technologist know if you are uncomfortable during the study.    18. If you sleep better with a special pillow or stuffed animal, you may bring it along. Service animals are the only live animals permitted.    19. Cable T.V. is available.    20. You will be scheduled for a follow-up appointment three to five days after the sleep study to review your results.    21. A copy of your sleep study is sent to the referring physician approximately two weeks after your study.    22. Any questions can be directed to our staff at [phone].    23. If CPAP therapy is applied, a home unit will be ordered for you through the [employer] medical equipment company. You will be contacted to schedule delivery after insurance authorization.

## 2023-11-02 NOTE — ASSESSMENT & PLAN NOTE
The insomnia started 15 years ago. Worse after COVID infection 1 year ago. Has difficulty falling asleep. She is able to stay asleep. As a child no issues with sleeping. She has tried a new elevated bed, hot tub to de stress, no screen time after 6 pm. No caffeine 4 hours before bedtime. She is going to bed at same time daily but will sleep in on days off, no napping. The last 2 months is not napping. She is walking. She has not had sleep study. Her STOP bang score is 4 today. History of tonsillectomy. She has had 2 nights when she did not fall asleep. She has OTC medications Melatonin but it gives her nightmares. Has not tried Trazodone.

## 2023-11-02 NOTE — PROGRESS NOTES
Subjective:     CC: Tremors, headache, fatigue, insomnia    HPI:   Toya presents today with her mother for the following:      Vitamin D deficiency  Recent labs show level of 25. She does not consistently take vitamin D3, plans to take daily.    Prediabetes  A1c has increased to 6.3%. Recommend she watch diet and exercise as tolerated.     Elevated alkaline phosphatase level  Remains elevated but improved. Denies RUQ pain, nausea, vomiting or jaundice.    Dyslipidemia  LDL remains elevated at 114. Continues rosuvastatin 10 mg every evening.    Difficulty sleeping  The insomnia started 15 years ago. Worse after COVID infection 1 year ago. Has difficulty falling asleep. She is able to stay asleep. As a child no issues with sleeping. She has tried a new elevated bed, hot tub to de stress, no screen time after 6 pm. No caffeine 4 hours before bedtime. She is going to bed at same time daily but will sleep in on days off, no napping. The last 2 months is not napping. She is walking. She has not had sleep study. Her STOP bang score is 4 today. History of tonsillectomy. She has had 2 nights when she did not fall asleep. She has OTC medications Melatonin but it gives her nightmares. Has not tried Trazodone.    Tremor of both hands  The tremors started in high school due to hypoglycemia. Worsened 18 months ago and worse after COVID infection from 1 year ago. Some days has slight tremor and others worse that she can not start IV. Aggravating factors include not sleeping. Alleviating factors include sleep. We have decreased her sertraline dose to 50 mg and notes some improvement.  She has been on sertraline for years.  No hand weakness.  Notes wearing a brace does provide some relief.      Past Medical History:   Diagnosis Date    Anxiety     Arthritis     osteo    Complex tear of medial meniscus of right knee, initial encounter 09/28/2018    COVID-19 01/2021    Depression     Genital herpes     Has valtrex PRN    High  cholesterol 2019    On a statin much lower now    Pain     Right knee    Prediabetes 09/2021    Snoring 11/2/2023       Social History     Tobacco Use    Smoking status: Never    Smokeless tobacco: Never   Vaping Use    Vaping Use: Never used   Substance Use Topics    Alcohol use: Not Currently     Comment: Nothing since 1/2021    Drug use: No       Current Outpatient Medications Ordered in Epic   Medication Sig Dispense Refill    fluconazole (DIFLUCAN) 150 MG tablet Last dose will be today      traZODone (DESYREL) 50 MG Tab Take 1 Tablet by mouth at bedtime as needed for Sleep. Take 30 minutes before bedtime. 30 Tablet 2    sertraline (ZOLOFT) 50 MG Tab Take 1 Tablet by mouth every day. 30 Tablet 2    rosuvastatin (CRESTOR) 10 MG Tab Take 1 Tablet by mouth every evening. Due for appointment with PCP for further refills 90 Tablet 0    tizanidine (ZANAFLEX) 2 MG capsule TAKE 2 CAPSULES BY MOUTH 3 TIMES A DAY AS NEEDED (MUSCLE SPASM). 30 Capsule 2    cholecalciferol (D3 5000) 5000 UNIT Cap Take 5,000 Units by mouth every day.      Multiple Vitamins-Minerals (MULTIVITAMIN GUMMIES WOMENS PO) Take  by mouth every day.      valacyclovir (VALTREX) 1 GM Tab TAKE 0.5 TABS BY MOUTH 2 TIMES A DAY AS NEEDED. 90 Tablet 1     No current Epic-ordered facility-administered medications on file.       Allergies:  Codeine    Health Maintenance: Completed      Objective:     Vital signs reviewed  Exam:  /60 (BP Location: Right arm, Patient Position: Sitting, BP Cuff Size: Adult)   Pulse 77   Temp 36.3 °C (97.3 °F) (Temporal)   Ht 1.524 m (5')   Wt 92.5 kg (204 lb)   SpO2 95%   BMI 39.84 kg/m²  Body mass index is 39.84 kg/m².    Gen: Alert and oriented, No apparent distress. Mother present.  Eyes:   Lids normal. Glasses in place.   Lungs: Normal effort, no audible wheezes  CV: Skin pink, warm and dry.  Ext: No clubbing, cyanosis, edema.  Positive Phalen, negative Tinel.  No pain to metacarpals.  Slight right hand tremor when  and extended.      Assessment & Plan:     58 y.o. female with the following -     Discussed and reviewed lab results from 10/31/2023.    1. Difficulty sleeping  Chronic exacerbated problem.  Her STOP-BANG score today is 4.  Check sleep study.  Start trazodone 50 mg at bedtime for sleep.  Return in 1 month for follow-up.  - Polysomnography, 4 or More; Future  - Referral to Pulmonary and Sleep Medicine  - traZODone (DESYREL) 50 MG Tab; Take 1 Tablet by mouth at bedtime as needed for Sleep. Take 30 minutes before bedtime.  Dispense: 30 Tablet; Refill: 2    2. Snoring  Chronic exacerbated problem.  See #1 above.  - Polysomnography, 4 or More; Future  - Referral to Pulmonary and Sleep Medicine    3. Tremor of both hands  Chronic exacerbated problem.  Continue with decreased dose of sertraline.  Consider EMG testing in future if wrist-based not helping.  Obvious electrolyte abnormalities or iron deficiency on lab results.    4. Dyslipidemia  Chronic exacerbated problem.  Continue rosuvastatin 10 mg every evening.  Continue healthy diet and exercise.    5. Prediabetes  Chronic exacerbated problem.  Continue healthy diet and exercise.    6. Elevated alkaline phosphatase level  Chronic exacerbated problem.  Monitor.    7. Vitamin D deficiency  Chronic exacerbated problem.  Take vitamin D3 5000 units daily.      Return in about 4 weeks (around 11/30/2023) for medication management, sleep.    Please note that this dictation was created using voice recognition software. I have made every reasonable attempt to correct obvious errors, but I expect that there are errors of grammar and possibly content that I did not discover before finalizing the note.

## 2023-11-08 ENCOUNTER — TELEPHONE (OUTPATIENT)
Dept: HEALTH INFORMATION MANAGEMENT | Facility: OTHER | Age: 58
End: 2023-11-08
Payer: COMMERCIAL

## 2023-11-12 ASSESSMENT — ENCOUNTER SYMPTOMS: SLEEP DISTURBANCE: 0

## 2023-11-13 ENCOUNTER — OFFICE VISIT (OUTPATIENT)
Dept: SLEEP MEDICINE | Facility: MEDICAL CENTER | Age: 58
End: 2023-11-13
Attending: NURSE PRACTITIONER
Payer: COMMERCIAL

## 2023-11-13 VITALS
HEIGHT: 60 IN | BODY MASS INDEX: 38.87 KG/M2 | DIASTOLIC BLOOD PRESSURE: 60 MMHG | OXYGEN SATURATION: 94 % | RESPIRATION RATE: 16 BRPM | HEART RATE: 70 BPM | SYSTOLIC BLOOD PRESSURE: 112 MMHG | WEIGHT: 198 LBS

## 2023-11-13 DIAGNOSIS — G47.30 SLEEP DISORDER BREATHING: ICD-10-CM

## 2023-11-13 DIAGNOSIS — F51.04 CHRONIC INSOMNIA: ICD-10-CM

## 2023-11-13 DIAGNOSIS — R06.83 SNORING: ICD-10-CM

## 2023-11-13 PROCEDURE — 3074F SYST BP LT 130 MM HG: CPT | Performed by: STUDENT IN AN ORGANIZED HEALTH CARE EDUCATION/TRAINING PROGRAM

## 2023-11-13 PROCEDURE — 99212 OFFICE O/P EST SF 10 MIN: CPT | Performed by: STUDENT IN AN ORGANIZED HEALTH CARE EDUCATION/TRAINING PROGRAM

## 2023-11-13 PROCEDURE — 3078F DIAST BP <80 MM HG: CPT | Performed by: STUDENT IN AN ORGANIZED HEALTH CARE EDUCATION/TRAINING PROGRAM

## 2023-11-13 PROCEDURE — 99204 OFFICE O/P NEW MOD 45 MIN: CPT | Performed by: STUDENT IN AN ORGANIZED HEALTH CARE EDUCATION/TRAINING PROGRAM

## 2023-11-13 ASSESSMENT — FIBROSIS 4 INDEX: FIB4 SCORE: 1.64

## 2023-11-13 NOTE — PATIENT INSTRUCTIONS
"    It was a pleasure meeting you today. Here are a list of resources for self-guided CTBI.    CBT-I Books  Priscila Mind: Turn Off Your Noisy Thoughts and Get a Good Night's Sleep - Mala Avelar,  and Allison Jones Phd  Accessible, enjoyable, and grounded in evidence-based cognitive behavioral therapy (CBT), Priscila Mind directly addresses the effects of rumination?or having an overactive brain?on your ability to sleep well. Written by two psychologists who specialize in sleep disorders, the book contains helpful exercises and insights into how you can better manage your thoughts at bedtime, and finally get some sleep.    Insomnia Solved: A Self-Directed Cognitive Behavioral Therapy for Insomnia (CBTI) Program - Janes Bustillo MD  Cognitive behavioral therapy for insomnia (CBTI) is often structured as a 6-week treatment program that can help people who have difficulty falling asleep, staying asleep, or find that sleep is unrefreshing. CBTI is scientifically proven, highly effective, and does not rely on medications. CBTI has life-long benefits and most participants report improved sleep satisfaction. Insomnia Solved is based on the core features of this treatment.    Quiet Your Mind and Get to Sleep: Solutions to Insomnia for Those with Depression, Anxiety or Chronic Pain - Allison Jones, PhD  This workbook uses cognitive behavior therapy, which has been shown to work as well as sleep medications and produce longer-lasting effects. Research shows that it also works well for those whose insomnia is experienced in the context of anxiety, depression, and chronic pain. The complete program in this book goes to the root of your insomnia and offers the same techniques used by experienced sleep specialists.    \"Say Priscila to Insomnia\" by Tyree Menjivar     \"No More Sleepless Nights\" by Rufus Jenkins    CBT-I Online Resources  Path to Better Sleep (free) - https://www.veterantraining.va.gov/insomnia/index.asp " "  This free online Cognitive Behavioral Therapy for Insomnia course, which was developed for veterans, takes you through a sleep \"check-in\" and the various components of CBT-I, including modifying unhelpful behaviors and unhelpful thoughts around sleep.    Insomnia Solved - Kris Bustillo MD ($79) - http://www.krisAdpeps.farmhopping/fix-my-insomnia/   Insomnia Solved is a self-guided CBTI program created by Kris Bustillo M.D., a board-certified medical doctor, that is priced inexpensively at $79. The complete program includes full access to exclusive multimedia content, including the 154-page eBook and online modules and audiofiles.    [employer] - Ramona Etienne, PhD, [facility] ($129) - https://[employer]/   [employer] is an insomnia program based on Cognitive Behavioral Treatment for Insomnia (CBTi) and is a 6-week self-guided online format. IT costs $219 for a 1-year subscription. You can sign up for the 7-day free trial and learn how CBTi can improve your sleep.    Apps  Insomnia  (free)   Offers a self-guided 5-week training plan designed to change sleep habits.  https://[employer].va.gov/toña/insomnia-    "

## 2023-11-13 NOTE — PROGRESS NOTES
[facility] Consult Note     Date: 11/13/2023 / Time: 9:29 AM      Thank you for requesting a sleep medicine consultation on ISABELLE WELLS at the sleep center. Presents today with the chief complaints of occasional snoring, on restorative sleep, trouble falling asleep, occasional excessive daytime sleepiness. She is referred by HELEN Chung  [address] [place] [address] for evaluation and treatment of sleep disorder breathing and insomnia.     HISTORY OF PRESENT ILLNESS:     ISABELLE WELLS is a 58 y.o. female with dyslipidemia, depression with anxiety, chronic insomnia, obesity, restless leg syndrome, and snoring.  Presents to Sleep Clinic for evaluation of sleep.    She states the main reason for today's visit is the fact that she has trouble initiating sleep.  She feels that over the past year she has had more difficulty with sleep initiation.  It takes her anywhere between 30 minutes and 3 hours to fall asleep if she is able to fall asleep.  She states occasionally she will have periods where she will be unable to fall asleep completely and then have to go to work her next day for 12 hours.  She was recently placed on trazodone which she has found helpful to her sleep.    She has been told that she snores in her sleep on occasion.  She does not find her sleep restorative.  During the day she can have low energy and sleepiness at times.  In addition she can have brain fog.  She does wake up with a dry mouth.  She does grind her teeth at night.    She also endorses restless legs for many years.  She recently had her iron levels checked.  She states her legs bother her about 3 nights a week.    As per supplemental questionnaire to be scanned or imported into chart:    [place] Sleepiness Score: 10    Sleep Schedule  Bedtime: Weekday 9pm  Weekend 10pm  Wake time: Weekday 6am Weekend 8-11am  Sleep-onset latency: 30 min to 3 hours, during work week   Awakenings  "from sleep: 2-3  Difficulty falling back asleep: generally not   Bedroom partner: yes  Naps: Yes days off if had a bad night sleep    DAYTIME SYMPTOMS:   Excessive daytime sleepiness: Yes  Daytime fatigue: Yes  Difficulty concentrating: Yes  Memory problems: Yes  Irritability:Yes  Work/school performance issues: No   Sleepiness with driving: No , long distances   Caffeine/stimulant use: Yes  Alcohol use:No     SLEEP RELATED SYMPTOMS  Snoring: Yes, sometimes   Witnessed apnea or gasping/choking: No   Dry mouth or mouth breathing: Yes  Sweating: No   Teeth grinding/biting: Yes  Morning headaches: Yes after days of not sleeping well   Refreshed Upon Awakening: No      SLEEP RELATED BEHAVIORS:  Parasomnias (walking, talking, eating, violence): No   Leg kicking: No   Restless legs - \"urge to move\": Yes, 3 times a week   Nightmares: No  Recurrent: No   Dream enactment: No      NARCOLEPSY:  Cataplexy: No   Sleep paralysis: No   Sleep attacks: No   Hypnagogic/hypnopompic hallucinations: No     MEDICAL HISTORY  Past Medical History:   Diagnosis Date    Anxiety     Arthritis     osteo    Chickenpox     Complex tear of medial meniscus of right knee, initial encounter 09/28/2018    COVID-19 01/2021    Daytime sleepiness     Depression     Dizziness     Genital herpes     Has valtrex PRN    High cholesterol 2019    On a statin much lower now    Insomnia     Nasal drainage     Pain     Right knee    Painful joint     Positive PPD     Prediabetes 09/2021    Restless leg syndrome     Snoring 11/02/2023    Wears glasses         SURGICAL HISTORY  Past Surgical History:   Procedure Laterality Date    PB KNEE SCOPE,MED OR LAT MENIS REPAIR Left 02/22/2023    Procedure: Knee arthroscopy with  versus partial medial meniscectomy, chondroplasty;  Surgeon: Aubrey Noonan M.D.;  Location: SURGERY [facility];  Service: Orthopedics    GANGLION EXCISION Left 02/22/2023    Procedure: EXCISION, GANGLION CYST;  Surgeon: Aubrey Noonan M.D.;  " Location: SURGERY [facility];  Service: Orthopedics    KNEE ARTHROSCOPY Right 09/28/2018    Procedure: KNEE ARTHROSCOPY;  Surgeon: Aubrey Noonan M.D.;  Location: SURGERY [facility];  Service: Orthopedics    MENISCECTOMY, KNEE, MEDIAL Right 09/28/2018    Procedure: MEDIAL MENISCECTOMY - PARTIAL;  Surgeon: Aubrey Noonan M.D.;  Location: SURGERY [facility];  Service: Orthopedics    CHONDROPLASTY Right 09/28/2018    Procedure: CHONDROPLASTY - AND GOMES;  Surgeon: Aubrey Noonan M.D.;  Location: SURGERY [facility];  Service: Orthopedics    DENISE BY LAPAROSCOPY  2008    HAND SURGERY Right 2000    right thumb ligament reattachement    HYSTERECTOMY, VAGINAL  2000    TONSILLECTOMY  1966    ARTHROSCOPY, KNEE      CHOLECYSTECTOMY      GYN SURGERY  2003    Partial hysterectomy    HYSTERECTOMY LAPAROSCOPY      OTHER  1999    Right thumb pinned after game keeper fracture    OTHER ABDOMINAL SURGERY  2001    Cholecystectomy    PRIMARY C SECTION          FAMILY HISTORY  Family History   Problem Relation Age of Onset    Clotting Disorder Sister         factor V    Heart Disease Sister         Heart murmur as a child    Thyroid Sister     Other Sister         parathyroidectomy     Kidney stones Sister     Hypertension Mother         Taking medication    Hypertension Father         Taking medication    Cancer Father         Prostate    Kidney stones Father     Diabetes Maternal Grandmother         Alcoholic    Cancer Maternal Grandmother         Abdominal    Alcohol abuse Maternal Grandmother     Alcohol abuse Maternal Grandfather     Diabetes Maternal Grandfather         Alcoholic    Other Maternal Grandfather         liver disease    Alzheimer's Disease Maternal Aunt        SOCIAL HISTORY  Social History     Socioeconomic History    Marital status:     Highest education level: Associate degree: occupational, technical, or vocational program   Tobacco Use    Smoking status: Never    Smokeless tobacco:  Never   Vaping Use    Vaping Use: Never used   Substance and Sexual Activity    Alcohol use: Not Currently     Comment: Nothing since 1/2021    Drug use: No    Sexual activity: Not Currently     Partners: Male     Birth control/protection: Other-See Comments     Comment: hysterectomy      Social Determinants of Health     Financial Resource Strain: Low Risk  (10/29/2023)    Overall Financial Resource Strain (CARDIA)     Difficulty of Paying Living Expenses: Not hard at all   Food Insecurity: No Food Insecurity (10/29/2023)    Hunger Vital Sign     Worried About Running Out of Food in the Last Year: Never true     Ran Out of Food in the Last Year: Never true   Transportation Needs: No Transportation Needs (10/29/2023)    PRAPARE - Transportation     Lack of Transportation (Medical): No     Lack of Transportation (Non-Medical): No   Physical Activity: Insufficiently Active (10/29/2023)    Exercise Vital Sign     Days of Exercise per Week: 2 days     Minutes of Exercise per Session: 20 min   Stress: Stress Concern Present (10/29/2023)    [place] [employer] - Occupational Stress Questionnaire     Feeling of Stress : Very much   Social Connections: Moderately Isolated (10/29/2023)    Social Connection and Isolation Panel [NHANES]     Frequency of Communication with Friends and Family: Three times a week     Frequency of Social Gatherings with Friends and Family: More than three times a week     Attends Jewish Services: 1 to 4 times per year     Active Member of Clubs or Organizations: No     Attends Club or Organization Meetings: Patient refused     Marital Status:    Housing Stability: Low Risk  (10/29/2023)    Housing Stability Vital Sign     Unable to Pay for Housing in the Last Year: No     Number of Places Lived in the Last Year: 1     Unstable Housing in the Last Year: No        Occupation: X ray Tech 7am to 7pm    CURRENT MEDICATIONS  Current Outpatient Medications   Medication Sig  Dispense Refill    traZODone (DESYREL) 50 MG Tab Take 1 Tablet by mouth at bedtime as needed for Sleep. Take 30 minutes before bedtime. 30 Tablet 2    sertraline (ZOLOFT) 50 MG Tab Take 1 Tablet by mouth every day. 30 Tablet 2    rosuvastatin (CRESTOR) 10 MG Tab Take 1 Tablet by mouth every evening. Due for appointment with PCP for further refills 90 Tablet 0    tizanidine (ZANAFLEX) 2 MG capsule TAKE 2 CAPSULES BY MOUTH 3 TIMES A DAY AS NEEDED (MUSCLE SPASM). 30 Capsule 2    cholecalciferol (D3 5000) 5000 UNIT Cap Take 5,000 Units by mouth every day.      Multiple Vitamins-Minerals (MULTIVITAMIN GUMMIES WOMENS PO) Take  by mouth every day.      valacyclovir (VALTREX) 1 GM Tab TAKE 0.5 TABS BY MOUTH 2 TIMES A DAY AS NEEDED. 90 Tablet 1    fluconazole (DIFLUCAN) 150 MG tablet Last dose will be today (Patient not taking: Reported on 11/13/2023)       No current facility-administered medications for this visit.       REVIEW OF SYSTEMS  Constitutional: Denies fevers, Denies weight changes  Ears/Nose/Throat/Mouth: Denies nasal congestion or sore throat   Cardiovascular: Denies chest pain  Respiratory: Denies shortness of breath, Denies cough  Gastrointestinal/Hepatic: Denies nausea, vomiting  Sleep: see HPI    Physical Examination:  Vitals/ General Appearance:   Weight/BMI: Body mass index is 38.67 kg/m².  /60 (BP Location: Left arm, Patient Position: Sitting, BP Cuff Size: Large adult)   Pulse 70   Resp 16   Ht 1.524 m (5')   Wt 89.8 kg (198 lb)   SpO2 94%   Vitals:    11/13/23 0930   BP: 112/60   BP Location: Left arm   Patient Position: Sitting   BP Cuff Size: Large adult   Pulse: 70   Resp: 16   SpO2: 94%   Weight: 89.8 kg (198 lb)   Height: 1.524 m (5')       Pt. is alert and oriented to time, place and person. Cooperative and in no apparent distress.     Constitutional: Alert, no distress, well-groomed.  Skin: No rashes in visible areas.  Eye: Round. Conjunctiva clear, lids normal. No icterus.   ENT  EXAM  Nasal alae/valves collapsible: No   Nasal septum deviation: No   Nasal turbinate hypertrophy: Left: Grade 1   Right: Grade 1  Hard palate narrow: Yes  Hard palate high: Yes  Soft palate/uvula (Mallampati score): 4  Tongue Scalloping: Yes  Retrognathia: No   Micrognathia: No   Cardiovascular:no murmus/gallops/rubs, normal S1 and S2 heart sounds, regular rate and rhythm  Pulmonary:Clear to auscultation, No wheezes, No crackles.  Neurologic:Awake, alert and oriented x 3, Normal age appropriate gait, No involuntary motions.  Extremities: No clubbing, cyanosis, or edema       ASSESSMENT AND PLAN   ISABELLE WELLS is a 58 y.o. female with dyslipidemia, depression with anxiety, chronic insomnia, obesity, restless leg syndrome, and snoring.  Presents to Sleep Clinic for evaluation of sleep.    1. ISABELLE WELLS  has symptoms of Obstructive Sleep Apnea (TYSHAWN). ISABELLE WELLS has symptoms of snoring, dry mouth, morning headaches, unrefreshed upon awakening, brain fog. These can interfere with activities of daily living.   ESS 10  Pt has risk factors for TYSHAWN include obesity, thick neck, and crowded oropharynx.     The pathophysiology of TYSHAWN and the increased risk of cardiovascular morbidity from untreated TYSHAWN is discussed in detail with the patient. She  also has Depression, RLS,  which can be worsened by TYSHAWN.      We have discussed diagnostic options including in-laboratory, attended polysomnography and home sleep testing. We have also discussed treatment options including airway pressurization, reconstructive otolaryngologic surgery, dental appliances and weight management.     Subsequently,treatment options will be discussed based on the diagnostic study. Meanwhile, She is urged to avoid supine sleep, weight gain and alcoholic beverages since all of these can worsen TYSHAWN. She is cautioned against drowsy driving. If She feels sleepy while driving, advised must pull over for a break/nap, rather than  persist on the road, in the interest of Pt's own safety and that of others on the road.    Plan  -  She is scheduled for an overnight PSG to assess sleep related breathing disorder.  - Follow up 1-2 weeks after sleep study to discuss results and treatment options moving forward   -Advised to reach out via MyChart or by phone with any questions or concerns.     2. Chronic Insomnia   With prolonged sleep latency, and feeling this is impacting daily functioning for 1 year meets criteria for chronic insomnia. Discussed potential causes of insomnia and importance of having a routine around bedtime. Advised to avoid laying in bed for more then 20-30 min if unable to fall asleep. Dicussed cognitive behavioral therapy for insomnia (CBTi) which is first line treatment for insomnia. Recommended pt participate in self directed CBTi as would likely benefit. Reviewed pharmacologic therapy which is second line treatment and may be considered if circumstances permit.     RECOMMENDATIONS  -Reference material given to patient regarding CBTi   -Maintain a regular sleep schedule  -Avoid caffeinated beverages after lunch, and alcohol in late afternoon and evening  -Avoid prolonged use of light-emitting screens before bedtime  -Exercise regularly for at least 20 minutes, preferably more than four to five hours prior to bedtime  -Avoid daytime naps, especially if they are longer than 20 to 30 minutes or occur late in the day     Regarding treatment of other past medical problems and general health maintenance,  Pt is urged to follow up with PCP.      Please note portions of this record was created using voice recognition software. I have made every reasonable attempt to correct obvious errors, but I expect that there are errors of grammar and possibly content I did not discover before finalizing the note.

## 2023-11-19 ENCOUNTER — SLEEP STUDY (OUTPATIENT)
Dept: SLEEP MEDICINE | Facility: MEDICAL CENTER | Age: 58
End: 2023-11-19
Attending: NURSE PRACTITIONER
Payer: COMMERCIAL

## 2023-11-19 DIAGNOSIS — G47.9 DIFFICULTY SLEEPING: ICD-10-CM

## 2023-11-19 DIAGNOSIS — R06.83 SNORING: ICD-10-CM

## 2023-11-19 PROCEDURE — 95810 POLYSOM 6/> YRS 4/> PARAM: CPT | Performed by: STUDENT IN AN ORGANIZED HEALTH CARE EDUCATION/TRAINING PROGRAM

## 2023-11-20 NOTE — PROCEDURES
Patient: ISABELLE WELLS  ID: [id number] Date: 11/19/2023   MONTAGE: Standard  STUDY TYPE: Diagnostic  RECORDING TECHNIQUE:   After the scalp was prepared, gold plated electrodes were applied to the scalp according to the International 10-20 System. EEG (electroencephalogram) was continuously monitored from the O1-M2, O2-M1, C3-M2, C4-M1, F3-M2, and F4-M1. EOGs (electrooculograms) were monitored by electrodes placed at the left and right outer canthi. Chin EMG (electromyogram) was monitored by electrodes placed on the mentalis and sub-mentalis muscles. Nasal and oral airflow were monitored using a triple port thermocouple as well as oronasal pressure transducer. Respiratory effort was measured by inductive plethysmography technology employing abdominal and thoracic belts. Blood oxygen saturation and pulse were monitored by pulse oximetry. Heart rhythm was monitored by surface electrocardiogram. Leg EMG was studied using surface electrodes placed on left and right anterior tibialis. A microphone was used to monitor tracheal sounds and snoring. Body position was monitored and documented by technician observation.   SCORING CRITERIA:   A modification of the AASM manual for scoring of sleep and associated events was used. Obstructive apneas were scored by cessation of airflow for at least 10 seconds with continuing respiratory effort. Central apneas were scored by cessation of airflow for at least 10 seconds with no respiratory effort. Hypopneas were scored by a 30% or more reduction in airflow for at least 10 seconds accompanied by arterial oxygen desaturation of 3% or an arousal. For CMS (Medicare) patients, per AASM rule 1B, hypopneas are scored by 30% with mild reduction in airflow for at least 10 seconds accompanied by arterial saturation decreased at 4%.  Study start time was 10:28:47 PM. Diagnostic recording time was 8h 26.0m with a total sleep time of 6h 7.0m resulting in a sleep efficiency of 72.53%%. Sleep latency  from the start of the study was 34 minutes and the latency from sleep to REM was 248 minutes. In total,72 arousals were scored for an arousal index of 11.8.  Respiratory:  There were a total of 11 apneas consisting of 3 obstructive apneas, 0 mixed apneas, and 8 central apneas. A total of 78 hypopneas were scored. The apnea index was 1.80 per hour and the hypopnea index was 12.75 per hour resulting in an overall AHI of 14.55. AHI during REM was 28.6 and AHI while supine was 0.00.  Oximetry:  There was a mean oxygen saturation of 90.0%. The minimum oxygen saturation in NREM was 87.0 % and in REM was 84.0%. The patient spent 40.0 minutes of TST with SaO2 <88%.  Cardiac:  The highest heart rate seen while awake was 97 BPM while the highest heart rate during sleep was 82 BPM with an average sleeping heart rate of 70 BPM.  Limb Movements:  There were a total of 249 PLMs during sleep which resulted in a PLMS index of 40.7. Of these, 39 were associated with arousals which resulted in a PLMS arousal index of 6.4.    Impression:  1.  Mild obstructive sleep apnea with an overall AHI of 14.5 events an hour  2.  Nocturnal hypoxia likely due to untreated sleep apnea with a time at or below 88% saturation of 40 minutes  3.  Respiratory events were worse during REM sleep with a REM AHI of 28.6 events an hour  4.  Majority of respiratory events occurred during second portion of the night with REM sleep  5.  Did not meet split-night criteria due to prolonged sleep latency and majority of respiratory events occurring towards end of study    Recommendations:  I recommend the patient consider return for a CPAP/BiPAP titration sleep-related hypoxia.   Patient may be a candidate for empiric home auto CPAP therapy.    In some cases alternative treatment options may be proven effective in resolving sleep apnea. These options include upper airway surgery, the use of a dental orthotic, weight loss, or positional therapy. Clinical correlation  is required. In general patients with sleep apnea are advised to avoid alcohol, sedatives and not to operate a motor vehicle while drowsy.  Untreated sleep apnea increases the risk for cardiovascular and neurovascular disease.

## 2023-11-24 DIAGNOSIS — F32.A ANXIETY AND DEPRESSION: ICD-10-CM

## 2023-11-24 DIAGNOSIS — F41.9 ANXIETY AND DEPRESSION: ICD-10-CM

## 2024-01-09 ENCOUNTER — OFFICE VISIT (OUTPATIENT)
Dept: SLEEP MEDICINE | Facility: MEDICAL CENTER | Age: 59
End: 2024-01-09
Attending: PHYSICIAN ASSISTANT
Payer: COMMERCIAL

## 2024-01-09 VITALS
WEIGHT: 208 LBS | OXYGEN SATURATION: 94 % | BODY MASS INDEX: 40.84 KG/M2 | DIASTOLIC BLOOD PRESSURE: 82 MMHG | SYSTOLIC BLOOD PRESSURE: 104 MMHG | RESPIRATION RATE: 16 BRPM | HEIGHT: 60 IN | HEART RATE: 92 BPM

## 2024-01-09 DIAGNOSIS — G47.33 OSA (OBSTRUCTIVE SLEEP APNEA): ICD-10-CM

## 2024-01-09 PROCEDURE — 3074F SYST BP LT 130 MM HG: CPT | Performed by: PHYSICIAN ASSISTANT

## 2024-01-09 PROCEDURE — 99213 OFFICE O/P EST LOW 20 MIN: CPT | Performed by: PHYSICIAN ASSISTANT

## 2024-01-09 PROCEDURE — 3079F DIAST BP 80-89 MM HG: CPT | Performed by: PHYSICIAN ASSISTANT

## 2024-01-09 PROCEDURE — 99212 OFFICE O/P EST SF 10 MIN: CPT | Performed by: PHYSICIAN ASSISTANT

## 2024-01-09 ASSESSMENT — ENCOUNTER SYMPTOMS
ORTHOPNEA: 0
CHILLS: 0
ROS GI COMMENTS: NO DENTURES, NO MISSING TEETH OR DIFFICULTY SWALLOWING
HEADACHES: 0
FEVER: 0
SINUS PAIN: 0
WEIGHT LOSS: 0
SPUTUM PRODUCTION: 0
SORE THROAT: 0
WHEEZING: 0
DIZZINESS: 0
INSOMNIA: 1
HEARTBURN: 0
COUGH: 0
PALPITATIONS: 0
TREMORS: 1
SHORTNESS OF BREATH: 0

## 2024-01-09 ASSESSMENT — PATIENT HEALTH QUESTIONNAIRE - PHQ9: CLINICAL INTERPRETATION OF PHQ2 SCORE: 0

## 2024-01-09 ASSESSMENT — FIBROSIS 4 INDEX: FIB4 SCORE: 1.64

## 2024-01-09 NOTE — PATIENT INSTRUCTIONS
1-reviewed sleep study results  2-will send auto cpap order to [facility]  3-reviewed risks of untreated or untreated sleep apnea  4-strategies for success   -minimum usage requirement of 4 hours per day avg and 70% of days    -therapeutic guidelines of 6-6.5 hours of use every day    -training period:   min of awake use for first 3-4 days    -transition to all night use  5-follow up in 3 months  6-bring entire device to clinic

## 2024-01-09 NOTE — PROGRESS NOTES
Chief Complaint   Patient presents with    Follow-Up     sleep disorder breathing. Last seen 11/13/23    Results     SSD 11/19/23       HPI:  ISABELLE WELLS is a 58 y.o. year old female here today for follow-up on sleep study results.  Patient is accompanied by her mother and daughter.  Previously evaluated in clinic 11/13/2023 by Dr. Janes Jansen.    Past Medical History: Anxiety, depression, vitamin D deficiency, cervical radiculopathy, chronic bilateral low back pain, difficulty sleeping, snoring, tremors.     Vitals:  /82 (BP Location: Left arm, Patient Position: Sitting, BP Cuff Size: Large adult)   Pulse 92   Resp 16   Ht 1.524 m (5')   Wt 94.3 kg (208 lb)   SpO2 94%     Recent Imaging: None    DME provider [facility] selected    Polysomnogram obtained 11/19/2023 demonstrating overall AHI of 14.55 increasing to 28.6 during REM sleep.  Low O2 sat of 84% with sats less than or equal to 88% for 40 minutes of total sleep time.  Also noted periodic limb movements with a PLMS index of 40.7 and arousal index of 6.4.  Findings consistent with mild plus obstructive sleep apnea.  Nocturnal hypoxia.  Patient did not meet split-night criteria as majority of respiratory events occurred during the second portion of the night.  Recommendation for titration study versus possible auto CPAP trial.  Patient would like to proceed with auto CPAP trial.      Sleep schedule goes to bed 9-10 PM, wakens 6 AM May sleep and as late as 11 AM on the weekends , and gets up during the night to-3 times.  Patient does occasionally nap after having a particularly bad night.   Symptoms  snoring, trouble initiating sleep, restless sleep, nonrestorative sleep, teeth grinding, brain fog.     [place] Sleepiness Scale reported as 10/24 on 11/13/2023  Stop Bang Score 4 (11/2/2023  3:46 PM)         Review of Systems   Constitutional:  Positive for malaise/fatigue (moderate+). Negative for chills, fever and weight loss.   HENT:  Positive  for congestion. Negative for hearing loss, nosebleeds, sinus pain, sore throat and tinnitus.    Eyes:         Presc glasses   Respiratory:  Negative for cough, sputum production, shortness of breath and wheezing.    Cardiovascular:  Negative for chest pain, palpitations, orthopnea and leg swelling.   Gastrointestinal:  Negative for heartburn.        No dentures, no missing teeth or difficulty swallowing   Neurological:  Positive for tremors. Negative for dizziness and headaches.   Psychiatric/Behavioral:  The patient has insomnia (both falling and maintaining sleep).        Past Medical History:   Diagnosis Date    Anxiety     Arthritis     osteo    Chickenpox     Complex tear of medial meniscus of right knee, initial encounter 09/28/2018    COVID-19 01/2021    Daytime sleepiness     Depression     Dizziness     Genital herpes     Has valtrex PRN    High cholesterol 2019    On a statin much lower now    Insomnia     Nasal drainage     Pain     Right knee    Painful joint     Positive PPD     Prediabetes 09/2021    Restless leg syndrome     Snoring 11/02/2023    Wears glasses        Past Surgical History:   Procedure Laterality Date    PB KNEE SCOPE,MED OR LAT MENIS REPAIR Left 02/22/2023    Procedure: Knee arthroscopy with  versus partial medial meniscectomy, chondroplasty;  Surgeon: Aubrey Noonan M.D.;  Location: [facility];  Service: Orthopedics    GANGLION EXCISION Left 02/22/2023    Procedure: EXCISION, GANGLION CYST;  Surgeon: Aubrey Noonan M.D.;  Location: [facility];  Service: Orthopedics    KNEE ARTHROSCOPY Right 09/28/2018    Procedure: KNEE ARTHROSCOPY;  Surgeon: Aubrey Noonan M.D.;  Location: [facility];  Service: Orthopedics    MENISCECTOMY, KNEE, MEDIAL Right 09/28/2018    Procedure: MEDIAL MENISCECTOMY - PARTIAL;  Surgeon: Aubrey Noonan M.D.;  Location: [facility];  Service: Orthopedics    CHONDROPLASTY Right 09/28/2018    Procedure: CHONDROPLASTY  - AND GOMES;  Surgeon: Aubrey Noonan M.D.;  Location: SUR[facility]ERY [facility];  Service: Orthopedics    DENISE BY LAPAROSCOPY  2008    HAND SURGERY Right 2000    right thumb ligament reattachement    HYSTERECTOMY, VAGINAL  2000    TONSILLECTOMY  1966    ARTHROSCOPY, KNEE      CHOLECYSTECTOMY      GYN SURGERY  2003    Partial hysterectomy    HYSTERECTOMY LAPAROSCOPY      OTHER  1999    Right thumb pinned after game keeper fracture    OTHER ABDOMINAL SURGERY  2001    Cholecystectomy    PRIMARY C SECTION         Family History   Problem Relation Age of Onset    Clotting Disorder Sister         factor V    Heart Disease Sister         Heart murmur as a child    Thyroid Sister     Other Sister         parathyroidectomy     Kidney stones Sister     Hypertension Mother         Taking medication    Hypertension Father         Taking medication    Cancer Father         Prostate    Kidney stones Father     Diabetes Maternal Grandmother         Alcoholic    Cancer Maternal Grandmother         Abdominal    Alcohol abuse Maternal Grandmother     Alcohol abuse Maternal Grandfather     Diabetes Maternal Grandfather         Alcoholic    Other Maternal Grandfather         liver disease    Alzheimer's Disease Maternal Aunt        Social History     Socioeconomic History    Marital status:      Spouse name: Not on file    Number of children: Not on file    Years of education: Not on file    Highest education level: Associate degree: occupational, technical, or vocational program   Occupational History    Not on file   Tobacco Use    Smoking status: Never    Smokeless tobacco: Never   Vaping Use    Vaping Use: Never used   Substance and Sexual Activity    Alcohol use: Not Currently     Comment: Nothing since 1/2021    Drug use: No    Sexual activity: Not Currently     Partners: Male     Birth control/protection: Other-See Comments     Comment: hysterectomy    Other Topics Concern    Not on file   Social History Narrative    Not  on file     Social Determinants of Health     Financial Resource Strain: Low Risk  (10/29/2023)    Overall Financial Resource Strain (CARDIA)     Difficulty of Paying Living Expenses: Not hard at all   Food Insecurity: No Food Insecurity (10/29/2023)    Hunger Vital Sign     Worried About Running Out of Food in the Last Year: Never true     Ran Out of Food in the Last Year: Never true   Transportation Needs: No Transportation Needs (10/29/2023)    PRAPARE - Transportation     Lack of Transportation (Medical): No     Lack of Transportation (Non-Medical): No   Physical Activity: Insufficiently Active (10/29/2023)    Exercise Vital Sign     Days of Exercise per Week: 2 days     Minutes of Exercise per Session: 20 min   Stress: Stress Concern Present (10/29/2023)    [place] [employer] - Occupational Stress Questionnaire     Feeling of Stress : Very much   Social Connections: Moderately Isolated (10/29/2023)    Social Connection and Isolation Panel [NHANES]     Frequency of Communication with Friends and Family: Three times a week     Frequency of Social Gatherings with Friends and Family: More than three times a week     Attends Protestant Services: 1 to 4 times per year     Active Member of Clubs or Organizations: No     Attends Club or Organization Meetings: Patient refused     Marital Status:    Intimate Partner Violence: Not on file   Housing Stability: Low Risk  (10/29/2023)    Housing Stability Vital Sign     Unable to Pay for Housing in the Last Year: No     Number of Places Lived in the Last Year: 1     Unstable Housing in the Last Year: No       Allergies as of 01/09/2024 - Reviewed 01/09/2024   Allergen Reaction Noted    Codeine Vomiting 10/24/2012          Current medications as of today   Current Outpatient Medications   Medication Sig Dispense Refill    sertraline (ZOLOFT) 50 MG Tab TAKE 1 TABLET BY MOUTH EVERY DAY 90 Tablet 0    traZODone (DESYREL) 50 MG Tab Take 1 Tablet by  mouth at bedtime as needed for Sleep. Take 30 minutes before bedtime. 30 Tablet 2    rosuvastatin (CRESTOR) 10 MG Tab Take 1 Tablet by mouth every evening. Due for appointment with PCP for further refills 90 Tablet 0    tizanidine (ZANAFLEX) 2 MG capsule TAKE 2 CAPSULES BY MOUTH 3 TIMES A DAY AS NEEDED (MUSCLE SPASM). 30 Capsule 2    cholecalciferol (D3 5000) 5000 UNIT Cap Take 5,000 Units by mouth every day.      Multiple Vitamins-Minerals (MULTIVITAMIN GUMMIES WOMENS PO) Take  by mouth every day.      valacyclovir (VALTREX) 1 GM Tab TAKE 0.5 TABS BY MOUTH 2 TIMES A DAY AS NEEDED. 90 Tablet 1    fluconazole (DIFLUCAN) 150 MG tablet Last dose will be today       No current facility-administered medications for this visit.         Physical Exam:   Gen:           Alert and oriented, No apparent distress. Mood and affect appropriate, normal interaction with examiner.   Hearing:     Grossly intact.  Nose:          Normal, no lesions or deformities.  Dentition:    Good dentition.   Oropharynx:   Tongue normal, posterior pharynx without erythema or exudate.  Mallampati Classification: IV  Neck:        Supple, trachea midline, no masses.  Respiratory Effort: No intercostal retractions or use of accessory muscles.   Gait and Station: Normal.  Digits and Nails: No clubbing, cyanosis, petechiae, or nodes.   Skin:        No rashes, lesions or ulcers noted.               Ext:           No cyanosis or edema.      Immunizations:  Flu: 10/30/2023  Pfizer booster SARS-CoV-2 vaccine: 11/7/2022  Pfizer SARS CoV2 Vaccine: 4/13/2022, 1/10/2021, 12/21/2020    Assessment / Plan:  1. TYSHAWN (obstructive sleep apnea)  - DME CPAP    Reviewed sleep study results, will send auto CPAP order to [facility].  Reviewed strategies for success including minimum usage requirements, therapeutic guidelines, training period.  Patient will need a 3-month follow-up appointment with 30 to 60 days of data usage to review.  She is instructed to bring entire device  to clinic for that first follow-up visit.  Reviewed risks associated with untreated or undertreated sleep apnea.    2. BMI 40.0-44.9, adult (HCC)    Elevated BMI: Discussion regarding impact central adiposity on pulmonary function.  Encouraged to increase activity as tolerated and monitor nutritional intake.  Weight loss is advised.        Follow-up:   Return in about 3 months (around 4/9/2024) for Return with Abimbola Baum PA-C.    Please note that this dictation was created using voice recognition software. I have made every reasonable attempt to correct obvious errors, but it is possible there are errors of grammar and possibly content that I did not discover before finalizing the note.

## 2024-01-31 DIAGNOSIS — G47.9 DIFFICULTY SLEEPING: ICD-10-CM

## 2024-01-31 RX ORDER — TRAZODONE HYDROCHLORIDE 50 MG/1
50 TABLET ORAL NIGHTLY PRN
Qty: 30 TABLET | Refills: 2 | Status: SHIPPED | OUTPATIENT
Start: 2024-01-31 | End: 2024-03-05 | Stop reason: SDUPTHER

## 2024-01-31 NOTE — TELEPHONE ENCOUNTER
Requested Prescriptions     Signed Prescriptions Disp Refills    traZODone (DESYREL) 50 MG Tab 30 Tablet 2     Sig: TAKE 1 TABLET BY MOUTH AT BEDTIME AS NEEDED FOR SLEEP. TAKE 30 MINUTES BEFORE BEDTIME.     Authorizing Provider: MARIELLA MARK A.P.R.N.

## 2024-01-31 NOTE — TELEPHONE ENCOUNTER
Received request via: Patient    Was the patient seen in the last year in this department? Yes    Does the patient have an active prescription (recently filled or refills available) for medication(s) requested? No    [facility]: [facility]    Does the patient have long-term Plus and need 100 day supply (blood pressure, diabetes and cholesterol meds only)? Patient does not have SCP

## 2024-02-26 ENCOUNTER — TELEPHONE (OUTPATIENT)
Dept: SLEEP MEDICINE | Facility: MEDICAL CENTER | Age: 59
End: 2024-02-26
Payer: COMMERCIAL

## 2024-02-26 DIAGNOSIS — F32.A ANXIETY AND DEPRESSION: ICD-10-CM

## 2024-02-26 DIAGNOSIS — F41.9 ANXIETY AND DEPRESSION: ICD-10-CM

## 2024-02-27 NOTE — TELEPHONE ENCOUNTER
Received request via: Patient    Was the patient seen in the last year in this department? Yes    Does the patient have an active prescription (recently filled or refills available) for medication(s) requested? No    [facility] Name:  [facility]    Does the patient have detention Plus and need 100 day supply (blood pressure, diabetes and cholesterol meds only)? Patient does not have SCP

## 2024-02-27 NOTE — TELEPHONE ENCOUNTER
Requested Prescriptions     Signed Prescriptions Disp Refills    sertraline (ZOLOFT) 50 MG Tab 90 Tablet 3     Sig: TAKE 1 TABLET BY MOUTH EVERY DAY     Authorizing Provider: MARIELLA MARK A.P.R.N.

## 2024-02-29 ENCOUNTER — OFFICE VISIT (OUTPATIENT)
Dept: SLEEP MEDICINE | Facility: MEDICAL CENTER | Age: 59
End: 2024-02-29
Attending: PHYSICIAN ASSISTANT
Payer: COMMERCIAL

## 2024-02-29 VITALS
SYSTOLIC BLOOD PRESSURE: 124 MMHG | HEIGHT: 60 IN | OXYGEN SATURATION: 93 % | HEART RATE: 69 BPM | WEIGHT: 209 LBS | DIASTOLIC BLOOD PRESSURE: 84 MMHG | RESPIRATION RATE: 14 BRPM | BODY MASS INDEX: 41.03 KG/M2

## 2024-02-29 DIAGNOSIS — F51.04 CHRONIC INSOMNIA: ICD-10-CM

## 2024-02-29 DIAGNOSIS — G47.33 OSA (OBSTRUCTIVE SLEEP APNEA): ICD-10-CM

## 2024-02-29 PROCEDURE — 99213 OFFICE O/P EST LOW 20 MIN: CPT | Performed by: PHYSICIAN ASSISTANT

## 2024-02-29 PROCEDURE — 3079F DIAST BP 80-89 MM HG: CPT | Performed by: PHYSICIAN ASSISTANT

## 2024-02-29 PROCEDURE — 3074F SYST BP LT 130 MM HG: CPT | Performed by: PHYSICIAN ASSISTANT

## 2024-02-29 ASSESSMENT — FIBROSIS 4 INDEX: FIB4 SCORE: 1.64

## 2024-02-29 ASSESSMENT — ENCOUNTER SYMPTOMS
TREMORS: 1
SPUTUM PRODUCTION: 0
HEADACHES: 0
ORTHOPNEA: 1
SORE THROAT: 0
INSOMNIA: 1
WHEEZING: 0
SINUS PAIN: 0
DIZZINESS: 0
COUGH: 0
CHILLS: 0
PALPITATIONS: 0
SHORTNESS OF BREATH: 0
FEVER: 0
WEIGHT LOSS: 0
ROS GI COMMENTS: NO DENTURES, NO MISSING TEETH OR SWALLOWING
HEARTBURN: 0

## 2024-02-29 NOTE — PROGRESS NOTES
Chief Complaint   Patient presents with    Follow-Up     FMLA paperwork  Last seen on 1/9/2024- Emmy Baum P.A-C   Cpap        HPI:  ISABELLE WELLS is a 58 y.o. year old female here today for follow-up on  TYSHAWN and FMLA paperwork.  Patient has initiated treatment however has been on therapy for about a week now demonstrating consistent use of device.  Previously seen in clinic 1/9/2024.  Previously evaluated by Dr. Janes Jansen 11/13/2023.    Past Medical History: Anxiety, depression, vitamin D deficiency, cervical radiculopathy, chronic bilateral low back pain, difficulty sleeping, snoring and tremors.    Vitals:  /84 (BP Location: Left arm, Patient Position: Sitting, BP Cuff Size: Adult)   Pulse 69   Resp 14   Ht 1.524 m (5')   Wt 94.8 kg (209 lb)   SpO2 93% BMI of 40.82 kg/m².    Recent Imaging: None    Has had device of brief time, too soon for first compliance.  Currently using  [facility] auto CPAP @5-15 cm H20 pressure; compliance reviewed for 8 days, days used 8/8, average daily usage 8 hours 3 minutes, 100% of days greater than or equal to 4 hours, mask leak at 19.1 LPM at 95th percentile, AHI 1.9 per hour.    Device [facility]20/2024  [facility]    Polysomnogram obtained 11/19/2023 demonstrating overall AHI of 14.55 increasing to 28.6 during REM sleep. Low O2 sat of 84% with sats less than or equal to 88% for 40 minutes of total sleep time. Also noted periodic limb movements with a PLMS index of 40.7 and arousal index of 6.4. Findings consistent with mild plus obstructive sleep apnea. Nocturnal hypoxia. Patient did not meet split-night criteria as majority of respiratory events occurred during the second portion of the night. Recommendation for titration study versus possible auto CPAP trial. Patient would like to proceed with auto CPAP trial.      Sleep schedule goes to bed 9-10 PM, wakens 6 AM , and gets up during the night was up to 3 times now down to once per night.    Symptoms  snoring, fatigue, nonrestorative sleep, teeth grinding, brain fog.    [place] Sleepiness Scale reported as 10/24 on 11/13/2023  Stop Bang Score 4 (11/2/2023  3:46 PM)         Review of Systems   Constitutional:  Positive for malaise/fatigue. Negative for chills, fever and weight loss.   HENT:  Negative for congestion, hearing loss, nosebleeds, sinus pain, sore throat and tinnitus.    Eyes:         Presc glasses   Respiratory:  Negative for cough, sputum production, shortness of breath and wheezing.    Cardiovascular:  Positive for orthopnea. Negative for chest pain, palpitations and leg swelling.   Gastrointestinal:  Negative for heartburn.        No dentures, no missing teeth or swallowing    Neurological:  Positive for tremors (since). Negative for dizziness and headaches.   Psychiatric/Behavioral:  The patient has insomnia (falling asleep).        Past Medical History:   Diagnosis Date    Anxiety     Arthritis     osteo    Chickenpox     Complex tear of medial meniscus of right knee, initial encounter 09/28/2018    COVID-19 01/2021    Daytime sleepiness     Depression     Dizziness     Genital herpes     Has valtrex PRN    High cholesterol 2019    On a statin much lower now    Insomnia     Nasal drainage     Pain     Right knee    Painful joint     Positive PPD     Prediabetes 09/2021    Restless leg syndrome     Snoring 11/02/2023    Wears glasses        Past Surgical History:   Procedure Laterality Date    PB KNEE SCOPE,MED OR LAT MENIS REPAIR Left 02/22/2023    Procedure: Knee arthroscopy with  versus partial medial meniscectomy, chondroplasty;  Surgeon: Aubrey Noonan M.D.;  Location: [facility];  Service: Orthopedics    GANGLION EXCISION Left 02/22/2023    Procedure: EXCISION, GANGLION CYST;  Surgeon: Aubrey Noonan M.D.;  Location: [facility];  Service: Orthopedics    KNEE ARTHROSCOPY Right 09/28/2018    Procedure: KNEE ARTHROSCOPY;  Surgeon: Aubrey Noonan M.D.;  Location:  SURGERY [facility];  Service: Orthopedics    MENISCECTOMY, KNEE, MEDIAL Right 09/28/2018    Procedure: MEDIAL MENISCECTOMY - PARTIAL;  Surgeon: Aubrey Noonan M.D.;  Location: SURGERY [facility];  Service: Orthopedics    CHONDROPLASTY Right 09/28/2018    Procedure: CHONDROPLASTY - AND GOMES;  Surgeon: Aubrey Noonan M.D.;  Location: SURGERY [facility];  Service: Orthopedics    DENISE BY LAPAROSCOPY  2008    HAND SURGERY Right 2000    right thumb ligament reattachement    HYSTERECTOMY, VAGINAL  2000    TONSILLECTOMY  1966    ARTHROSCOPY, KNEE      CHOLECYSTECTOMY      GYN SURGERY  2003    Partial hysterectomy    HYSTERECTOMY LAPAROSCOPY      OTHER  1999    Right thumb pinned after game keeper fracture    OTHER ABDOMINAL SURGERY  2001    Cholecystectomy    PRIMARY C SECTION         Family History   Problem Relation Age of Onset    Clotting Disorder Sister         factor V    Heart Disease Sister         Heart murmur as a child    Thyroid Sister     Other Sister         parathyroidectomy     Kidney stones Sister     Hypertension Mother         Taking medication    Hypertension Father         Taking medication    Cancer Father         Prostate    Kidney stones Father     Diabetes Maternal Grandmother         Alcoholic    Cancer Maternal Grandmother         Abdominal    Alcohol abuse Maternal Grandmother     Alcohol abuse Maternal Grandfather     Diabetes Maternal Grandfather         Alcoholic    Other Maternal Grandfather         liver disease    Alzheimer's Disease Maternal Aunt        Social History     Socioeconomic History    Marital status:      Spouse name: Not on file    Number of children: Not on file    Years of education: Not on file    Highest education level: Associate degree: occupational, technical, or vocational program   Occupational History    Not on file   Tobacco Use    Smoking status: Never    Smokeless tobacco: Never   Vaping Use    Vaping Use: Never used   Substance and  Sexual Activity    Alcohol use: Not Currently     Comment: Nothing since 1/2021    Drug use: No    Sexual activity: Not Currently     Partners: Male     Birth control/protection: Other-See Comments     Comment: hysterectomy    Other Topics Concern    Not on file   Social History Narrative    Not on file     Social Determinants of Health     Financial Resource Strain: Low Risk  (10/29/2023)    Overall Financial Resource Strain (CARDIA)     Difficulty of Paying Living Expenses: Not hard at all   Food Insecurity: No Food Insecurity (10/29/2023)    Hunger Vital Sign     Worried About Running Out of Food in the Last Year: Never true     Ran Out of Food in the Last Year: Never true   Transportation Needs: No Transportation Needs (10/29/2023)    PRAPARE - Transportation     Lack of Transportation (Medical): No     Lack of Transportation (Non-Medical): No   Physical Activity: Insufficiently Active (10/29/2023)    Exercise Vital Sign     Days of Exercise per Week: 2 days     Minutes of Exercise per Session: 20 min   Stress: Stress Concern Present (10/29/2023)    [place] of [employer]ational [employer] - Occupational Stress Questionnaire     Feeling of Stress : Very much   Social Connections: Moderately Isolated (10/29/2023)    Social Connection and Isolation Panel [NHANES]     Frequency of Communication with Friends and Family: Three times a week     Frequency of Social Gatherings with Friends and Family: More than three times a week     Attends [employer]ist Services: 1 to 4 times per year     Active Member of Clubs or Organizations: No     Attends Club or Organization Meetings: Patient declined     Marital Status:    Intimate Partner Violence: Not on file   Housing Stability: Low Risk  (10/29/2023)    Housing Stability Vital Sign     Unable to Pay for Housing in the Last Year: No     Number of Places Lived in the Last Year: 1     Unstable Housing in the Last Year: No       Allergies as of 02/29/2024 - Reviewed  02/29/2024   Allergen Reaction Noted    Codeine Vomiting 10/24/2012          Current medications as of today   Current Outpatient Medications   Medication Sig Dispense Refill    sertraline (ZOLOFT) 50 MG Tab TAKE 1 TABLET BY MOUTH EVERY DAY 90 Tablet 3    traZODone (DESYREL) 50 MG Tab TAKE 1 TABLET BY MOUTH AT BEDTIME AS NEEDED FOR SLEEP. TAKE 30 MINUTES BEFORE BEDTIME. 30 Tablet 2    rosuvastatin (CRESTOR) 10 MG Tab Take 1 Tablet by mouth every evening. Due for appointment with PCP for further refills 90 Tablet 0    tizanidine (ZANAFLEX) 2 MG capsule TAKE 2 CAPSULES BY MOUTH 3 TIMES A DAY AS NEEDED (MUSCLE SPASM). 30 Capsule 2    cholecalciferol (D3 5000) 5000 UNIT Cap Take 5,000 Units by mouth every day.      Multiple Vitamins-Minerals (MULTIVITAMIN GUMMIES WOMENS PO) Take  by mouth every day.      valacyclovir (VALTREX) 1 GM Tab TAKE 0.5 TABS BY MOUTH 2 TIMES A DAY AS NEEDED. 90 Tablet 1     No current facility-administered medications for this visit.         Physical Exam:   Gen:           Alert and oriented, No apparent distress. Mood and affect appropriate, normal interaction with examiner.   Hearing:     Grossly intact.  Nose:          Normal, no lesions or deformities.  Dentition:    Good dentition.   Oropharynx:   Tongue normal, posterior pharynx without erythema or exudate.  Mallampati Classification: IV  Neck:        Supple, trachea midline, no masses.  Respiratory Effort: No intercostal retractions or use of accessory muscles.   Gait and Station: Normal.  Digits and Nails: No clubbing, cyanosis, petechiae, or nodes.   Skin:        No rashes, lesions or ulcers noted.               Ext:           No cyanosis or edema.      Immunizations:  Flu: 10/30/2023   Pfizer booster SARS-CoV-2 vaccine: 11/7/2022  Pfizer SARS CoV2 Vaccine: 4/13/2022, 1/10/2021, 12/21/2020    Assessment / Plan:    1. TYSHAWN (obstructive sleep apnea)    Reviewed history, review of systems and physical findings.  Patient has been initiated  CPAP therapy with benefit seen although too soon to achieve the optimal benefit.  Committed to ongoing use.  Paperwork completed for FMLA.  See media.  Will need to keep follow-up as scheduled for first compliance.    2. BMI 40.0-44.9, adult (HCC)    Elevated BMI: Discussion regarding impact central adiposity on pulmonary function.  Encouraged to increase activity as tolerated and monitor nutritional intake.      3. Chronic insomnia    Hope to see improvement in her chronic insomnia with treatment TYSHAWN.  Reviewed good sleep hygiene.    Follow-up:   Follow-up as scheduled in April for first compliance TYSHAWN.    Please note that this dictation was created using voice recognition software. I have made every reasonable attempt to correct obvious errors, but it is possible there are errors of grammar and possibly content that I did not discover before finalizing the note.

## 2024-02-29 NOTE — PATIENT INSTRUCTIONS
1-reviewed history, review of systems and physical findings  2-has initiated cpap therapy with benefit seen  3-paperwork completed for FMLA   4-follow up as scheduled for compliance for cpap therapy

## 2024-03-05 ENCOUNTER — OFFICE VISIT (OUTPATIENT)
Dept: MEDICAL GROUP | Facility: PHYSICIAN GROUP | Age: 59
End: 2024-03-05
Payer: COMMERCIAL

## 2024-03-05 VITALS
DIASTOLIC BLOOD PRESSURE: 72 MMHG | WEIGHT: 208 LBS | SYSTOLIC BLOOD PRESSURE: 116 MMHG | HEIGHT: 60 IN | HEART RATE: 65 BPM | TEMPERATURE: 97 F | BODY MASS INDEX: 40.84 KG/M2 | OXYGEN SATURATION: 95 %

## 2024-03-05 DIAGNOSIS — F41.9 ANXIETY AND DEPRESSION: ICD-10-CM

## 2024-03-05 DIAGNOSIS — F32.A ANXIETY AND DEPRESSION: ICD-10-CM

## 2024-03-05 DIAGNOSIS — G47.09 OTHER INSOMNIA: ICD-10-CM

## 2024-03-05 PROCEDURE — 3078F DIAST BP <80 MM HG: CPT | Performed by: NURSE PRACTITIONER

## 2024-03-05 PROCEDURE — 99214 OFFICE O/P EST MOD 30 MIN: CPT | Performed by: NURSE PRACTITIONER

## 2024-03-05 PROCEDURE — 3074F SYST BP LT 130 MM HG: CPT | Performed by: NURSE PRACTITIONER

## 2024-03-05 RX ORDER — SERTRALINE HYDROCHLORIDE 100 MG/1
100 TABLET, FILM COATED ORAL DAILY
Qty: 90 TABLET | Refills: 3 | Status: SHIPPED | OUTPATIENT
Start: 2024-03-05

## 2024-03-05 RX ORDER — TRAZODONE HYDROCHLORIDE 50 MG/1
50 TABLET ORAL NIGHTLY PRN
Qty: 90 TABLET | Refills: 3 | Status: SHIPPED | OUTPATIENT
Start: 2024-03-05

## 2024-03-05 ASSESSMENT — FIBROSIS 4 INDEX: FIB4 SCORE: 1.64

## 2024-03-05 ASSESSMENT — PATIENT HEALTH QUESTIONNAIRE - PHQ9
CLINICAL INTERPRETATION OF PHQ2 SCORE: 1
5. POOR APPETITE OR OVEREATING: 1 - SEVERAL DAYS
SUM OF ALL RESPONSES TO PHQ QUESTIONS 1-9: 3

## 2024-03-06 NOTE — ASSESSMENT & PLAN NOTE
At her last appointment her sertraline dose was decreased to 50 mg daily.  She has been taking sertraline 50 mg daily until 1 month ago. She states that her depression worsened the 50 mg dose.  She reports having more anger, more anxiety and more sensitive. She was at the 50 mg dose for 3 months and in the last month increased her sertraline dose back to 100 mg. States symptoms are better but would like to try alternative medication that doesn't cause insomnia. She is interested in [facility] treatment as her sister is in treatment. Previously on Prozac. States last week was good for her. PHQ score today is 3.

## 2024-03-06 NOTE — ASSESSMENT & PLAN NOTE
She is using CPAP and has been able to sleep better with CPAP and trazodone 50 mg nightly as needed. She is able to fall asleep and stay asleep.

## 2024-03-06 NOTE — PROGRESS NOTES
Subjective:     CC: New Med Request    HPI:   ISABELLE presents today with her mother for the following:    Anxiety and depression  At her last appointment her sertraline dose was decreased to 50 mg daily.  She has been taking sertraline 50 mg daily until 1 month ago. She states that her depression worsened the 50 mg dose.  She reports having more anger, more anxiety and more sensitive. She was at the 50 mg dose for 3 months and in the last month increased her sertraline dose back to 100 mg. States symptoms are better but would like to try alternative medication that doesn't cause insomnia. She is interested in [facility] treatment as her sister is in treatment. Previously on Prozac. States last week was good for her. PHQ score today is 3.    Other insomnia  She is using CPAP and has been able to sleep better with CPAP and trazodone 50 mg nightly as needed. She is able to fall asleep and stay asleep.     Past Medical History:   Diagnosis Date    Anxiety     Arthritis     osteo    Chickenpox     Complex tear of medial meniscus of right knee, initial encounter 09/28/2018    COVID-19 01/2021    Daytime sleepiness     Depression     Dizziness     Genital herpes     Has valtrex PRN    High cholesterol 2019    On a statin much lower now    Insomnia     Nasal drainage     Pain     Right knee    Painful joint     Positive PPD     Prediabetes 09/2021    Restless leg syndrome     Snoring 11/02/2023    Wears glasses        Social History     Tobacco Use    Smoking status: Never    Smokeless tobacco: Never   Vaping Use    Vaping Use: Never used   Substance Use Topics    Alcohol use: Not Currently     Comment: Nothing since 1/2021    Drug use: No       Current Outpatient Medications Ordered in Epic   Medication Sig Dispense Refill    sertraline (ZOLOFT) 100 MG Tab Take 1 Tablet by mouth every day. 90 Tablet 3    traZODone (DESYREL) 50 MG Tab Take 1 Tablet by mouth at bedtime as needed for Sleep. Take 30 minutes before bedtime. 90  Tablet 3    rosuvastatin (CRESTOR) 10 MG Tab Take 1 Tablet by mouth every evening. Due for appointment with PCP for further refills 90 Tablet 0    tizanidine (ZANAFLEX) 2 MG capsule TAKE 2 CAPSULES BY MOUTH 3 TIMES A DAY AS NEEDED (MUSCLE SPASM). 30 Capsule 2    cholecalciferol (D3 5000) 5000 UNIT Cap Take 5,000 Units by mouth every day.      Multiple Vitamins-Minerals (MULTIVITAMIN GUMMIES WOMENS PO) Take  by mouth every day.      valacyclovir (VALTREX) 1 GM Tab TAKE 0.5 TABS BY MOUTH 2 TIMES A DAY AS NEEDED. 90 Tablet 1     No current Epic-ordered facility-administered medications on file.       Allergies:  Codeine    Health Maintenance: Completed      Objective:     Vital signs reviewed  Exam:  /72 (BP Location: Left arm, Patient Position: Sitting, BP Cuff Size: Large adult)   Pulse 65   Temp 36.1 °C (97 °F) (Temporal)   Ht 1.524 m (5')   Wt 94.3 kg (208 lb)   SpO2 95%   BMI 40.62 kg/m²  Body mass index is 40.62 kg/m².    Gen: Alert and oriented, No apparent distress.  Neck: Neck is supple without lymphadenopathy.  Lungs: Normal effort, CTA bilaterally, no wheezes, rhonchi, or rales  CV: Regular rate and rhythm. No murmurs, rubs, or gallops.  Ext: No clubbing, cyanosis, edema.      Assessment & Plan:     58 y.o. female with the following -     1. Anxiety and depression  Chronic exacerbated problem.  Discussion today that if we switch SSRIs we will need to wean down her sertraline dose before starting any medication.  She does not wish to switch medication at this time.  She will continue with sertraline 100 mg daily.  We have placed a referral to [facility] for treatment with [facility].  She would like [facility] paperwork completed once she starts treatment.  She will make an appointment with her paperwork when she obtains from her employer.  - Referral to Neurology  - sertraline (ZOLOFT) 100 MG Tab; Take 1 Tablet by mouth every day.  Dispense: 90 Tablet; Refill: 3    2. Other  insomnia  Chronic stable problem.  Continue trazodone 50 mg at bedtime as needed.  Continue nightly CPAP.    - traZODone (DESYREL) 50 MG Tab; Take 1 Tablet by mouth at bedtime as needed for Sleep. Take 30 minutes before bedtime.  Dispense: 90 Tablet; Refill: 3        Return for FMLA.    Please note that this dictation was created using voice recognition software. I have made every reasonable attempt to correct obvious errors, but I expect that there are errors of grammar and possibly content that I did not discover before finalizing the note.

## 2024-03-12 ENCOUNTER — APPOINTMENT (OUTPATIENT)
Dept: MEDICAL GROUP | Facility: PHYSICIAN GROUP | Age: 59
End: 2024-03-12
Payer: COMMERCIAL

## 2024-03-20 ENCOUNTER — OFFICE VISIT (OUTPATIENT)
Dept: MEDICAL GROUP | Facility: PHYSICIAN GROUP | Age: 59
End: 2024-03-20
Payer: COMMERCIAL

## 2024-03-20 VITALS
DIASTOLIC BLOOD PRESSURE: 60 MMHG | BODY MASS INDEX: 40.64 KG/M2 | SYSTOLIC BLOOD PRESSURE: 106 MMHG | WEIGHT: 207 LBS | HEIGHT: 60 IN | OXYGEN SATURATION: 94 % | HEART RATE: 75 BPM | TEMPERATURE: 98.5 F

## 2024-03-20 DIAGNOSIS — F41.9 ANXIETY AND DEPRESSION: ICD-10-CM

## 2024-03-20 DIAGNOSIS — Z02.89 ENCOUNTER FOR COMPLETION OF FORM WITH PATIENT: ICD-10-CM

## 2024-03-20 DIAGNOSIS — F32.A ANXIETY AND DEPRESSION: ICD-10-CM

## 2024-03-20 PROCEDURE — 99214 OFFICE O/P EST MOD 30 MIN: CPT | Performed by: NURSE PRACTITIONER

## 2024-03-20 PROCEDURE — 3074F SYST BP LT 130 MM HG: CPT | Performed by: NURSE PRACTITIONER

## 2024-03-20 PROCEDURE — 3078F DIAST BP <80 MM HG: CPT | Performed by: NURSE PRACTITIONER

## 2024-03-20 ASSESSMENT — FIBROSIS 4 INDEX: FIB4 SCORE: 1.64

## 2024-03-20 NOTE — ASSESSMENT & PLAN NOTE
She was able to start [facility], [facility] approved. Starting treatment for anxiety, depression and insomnia with plans for next week. Neurology group where [facility] to be completed is open Monday-Friday 8-5. Patient has work during these hours. She plans to start new job location to accommodate her sessions. She would like a referral to psychology. She needs [facility] paperwork completed today.

## 2024-03-20 NOTE — PROGRESS NOTES
Subjective:     CC: paperwork     HPI:   ISABELLE presents today with the following:    Anxiety and depression  She was able to start [facility], [facility] approved. Starting treatment for anxiety, depression and insomnia with plans for next week. Neurology group where [facility] to be completed is open Monday-Friday 8-5. Patient has work during these hours. She plans to start new job location to accommodate her sessions. She would like a referral to psychology. She needs [facility] paperwork completed today.      Past Medical History:   Diagnosis Date    Anxiety     Arthritis     osteo    Chickenpox     Complex tear of medial meniscus of right knee, initial encounter 09/28/2018    COVID-19 01/2021    Daytime sleepiness     Depression     Dizziness     Genital herpes     Has valtrex PRN    High cholesterol 2019    On a statin much lower now    Insomnia     Nasal drainage     Pain     Right knee    Painful joint     Positive PPD     Prediabetes 09/2021    Restless leg syndrome     Snoring 11/02/2023    Wears glasses        Social History     Tobacco Use    Smoking status: Never    Smokeless tobacco: Never   Vaping Use    Vaping Use: Never used   Substance Use Topics    Alcohol use: Not Currently     Comment: Nothing since 1/2021    Drug use: No       Current Outpatient Medications Ordered in Epic   Medication Sig Dispense Refill    sertraline (ZOLOFT) 100 MG Tab Take 1 Tablet by mouth every day. 90 Tablet 3    traZODone (DESYREL) 50 MG Tab Take 1 Tablet by mouth at bedtime as needed for Sleep. Take 30 minutes before bedtime. 90 Tablet 3    rosuvastatin (CRESTOR) 10 MG Tab Take 1 Tablet by mouth every evening. Due for appointment with PCP for further refills 90 Tablet 0    tizanidine (ZANAFLEX) 2 MG capsule TAKE 2 CAPSULES BY MOUTH 3 TIMES A DAY AS NEEDED (MUSCLE SPASM). 30 Capsule 2    cholecalciferol (D3 5000) 5000 UNIT Cap Take 5,000 Units by mouth every day.      Multiple Vitamins-Minerals (MULTIVITAMIN GUMMIES WOMENS PO) Take   by mouth every day.      valacyclovir (VALTREX) 1 GM Tab TAKE 0.5 TABS BY MOUTH 2 TIMES A DAY AS NEEDED. 90 Tablet 1     No current Epic-ordered facility-administered medications on file.       Allergies:  Codeine    Health Maintenance: Completed      Objective:     Vital signs reviewed  Exam:  /60 (BP Location: Right arm, Patient Position: Sitting, BP Cuff Size: Adult)   Pulse 75   Temp 36.9 °C (98.5 °F) (Temporal)   Ht 1.524 m (5')   Wt 93.9 kg (207 lb)   SpO2 94%   BMI 40.43 kg/m²  Body mass index is 40.43 kg/m².    Gen: Alert and oriented, No apparent distress.  Eyes:   Lids normal. Glasses in place.  Lungs: Normal effort, no audible wheezes  CV: Skin pink, warm and dry.  Ext: No clubbing, cyanosis, edema.      Assessment & Plan:     58 y.o. female with the following -     1. Anxiety and depression  Chronic stable problem.  Continue with [facility] sessions with neurology.  Patient would like referral to psychology.  Referral placed.  Continue sertraline 100 mg daily.  - Referral to Psychology    2. Encounter for completion of form with patient  Acute uncomplicated problem.  [facility] paperwork completed today.  We are faxing to her employer.  Copy scanned into chart.      Return if symptoms worsen or fail to improve.    Please note that this dictation was created using voice recognition software. I have made every reasonable attempt to correct obvious errors, but I expect that there are errors of grammar and possibly content that I did not discover before finalizing the note.

## 2024-04-01 DIAGNOSIS — E78.5 DYSLIPIDEMIA: ICD-10-CM

## 2024-04-01 RX ORDER — ROSUVASTATIN CALCIUM 10 MG/1
10 TABLET, COATED ORAL EVERY EVENING
Qty: 90 TABLET | Refills: 0 | Status: SHIPPED | OUTPATIENT
Start: 2024-04-01

## 2024-04-01 NOTE — TELEPHONE ENCOUNTER
Received request via: Pharmacy    Was the patient seen in the last year in this department? Yes    Does the patient have an active prescription (recently filled or refills available) for medication(s) requested? No    [facility]    Does the patient have assisted Plus and need 100 day supply (blood pressure, diabetes and cholesterol meds only)? Patient does not have SCP

## 2024-04-01 NOTE — TELEPHONE ENCOUNTER
Requested Prescriptions     Pending Prescriptions Disp Refills    rosuvastatin (CRESTOR) 10 MG Tab [Pharmacy Med Name: ROSUVASTATIN CALCIUM 10 MG TAB] 90 Tablet 0     Sig: TAKE 1 TABLET BY MOUTH EVERY EVENING. DUE FOR APPOINTMENT WITH PCP FOR FURTHER REFILLS

## 2024-04-09 ENCOUNTER — OFFICE VISIT (OUTPATIENT)
Dept: SLEEP MEDICINE | Facility: MEDICAL CENTER | Age: 59
End: 2024-04-09
Attending: PHYSICIAN ASSISTANT
Payer: COMMERCIAL

## 2024-04-09 VITALS
HEIGHT: 60 IN | DIASTOLIC BLOOD PRESSURE: 70 MMHG | BODY MASS INDEX: 39.07 KG/M2 | SYSTOLIC BLOOD PRESSURE: 116 MMHG | OXYGEN SATURATION: 93 % | HEART RATE: 79 BPM | WEIGHT: 199 LBS

## 2024-04-09 DIAGNOSIS — G47.33 OSA (OBSTRUCTIVE SLEEP APNEA): ICD-10-CM

## 2024-04-09 DIAGNOSIS — E66.9 OBESITY (BMI 30-39.9): ICD-10-CM

## 2024-04-09 PROCEDURE — 3074F SYST BP LT 130 MM HG: CPT | Performed by: PHYSICIAN ASSISTANT

## 2024-04-09 PROCEDURE — 99213 OFFICE O/P EST LOW 20 MIN: CPT | Performed by: PHYSICIAN ASSISTANT

## 2024-04-09 PROCEDURE — 3078F DIAST BP <80 MM HG: CPT | Performed by: PHYSICIAN ASSISTANT

## 2024-04-09 ASSESSMENT — ENCOUNTER SYMPTOMS
DIZZINESS: 0
WEIGHT LOSS: 0
SORE THROAT: 0
HEADACHES: 1
SINUS PAIN: 0
WHEEZING: 0
ORTHOPNEA: 0
SPUTUM PRODUCTION: 0
PALPITATIONS: 0
CHILLS: 0
FEVER: 0
SHORTNESS OF BREATH: 0
INSOMNIA: 0
COUGH: 0
HEARTBURN: 0
ROS GI COMMENTS: NO DENTURES, NO MISSING TEETH OR SWALLOWING ISSUES
TREMORS: 1

## 2024-04-09 ASSESSMENT — FIBROSIS 4 INDEX: FIB4 SCORE: 1.64

## 2024-04-09 NOTE — PROGRESS NOTES
Chief Complaint   Patient presents with    Follow-Up     Last seen on 2/29/2024- Abimbola Baum P.A-C       HPI:  Toya Shields is a 58 y.o. year old female here today for follow-up on first compliance.  Patient last seen in clinic 2/29/2024 by CRISTIANO López.  Previously evaluated 11/13/2023 by Dr. Janes Jansen.    Past Medical History: Anxiety and depression, cervical radiculopathy, vitamin D deficiency, insomnia, tremor, restless leg syndrome.    Vitals:  /70 (BP Location: Right arm, Patient Position: Sitting, BP Cuff Size: Adult)   Pulse 79   Ht 1.524 m (5')   Wt 90.3 kg (199 lb)   SpO2 93% BMI of 38.86 kg/m².    Recent Imaging: None    Currently using  Resmed auto CPAP @5-50 cm H20 pressure; compliance reviewed for 3/10/2024 through 4/8/2024, days used 30/30, average daily usage 8 hours 44 minutes, 100% of days greater than or equal to 4 hours, mask leak at 12 LPM at 95th percentile, AHI 2.3 per hour.  See media for full report    Device obtained 2/20/2024  DME provider [employer]  Mask interface hybrid fullface mask    Polysomnogram obtained 11/19/2023 demonstrating overall AHI of 14.55 increasing to 28.6 during REM sleep. Low O2 sat of 84% with sats less than or equal to 88% for 40 minutes of total sleep time. Also noted periodic limb movements with a PLMS index of 40.7 and arousal index of 6.4. Findings consistent with mild plus obstructive sleep apnea. Nocturnal hypoxia. Patient did not meet split-night criteria as majority of respiratory events occurred during the second portion of the night. Recommendation for titration study versus possible auto CPAP trial. Patient would like to proceed with auto CPAP trial.      Sleep schedule goes to bed 9-10 PM, wakens 530 a.m. during the week and 830 or 9 AM on weekend , and gets up during the night x 1   Symptoms denies day time somnolence and denies morning headache  Reports benefit in improved energy and sleep quality.    [place] Sleepiness  Scale reported as 10/24 on 11/13/2023  Stop Bang Score 4 (11/2/2023  3:46 PM)         Review of Systems   Constitutional:  Negative for chills, fever, malaise/fatigue and weight loss.   HENT:  Negative for congestion, hearing loss, nosebleeds, sinus pain, sore throat and tinnitus.    Eyes:         Presc glasses    Respiratory:  Negative for cough, sputum production, shortness of breath and wheezing.    Cardiovascular:  Negative for chest pain, palpitations, orthopnea and leg swelling.   Gastrointestinal:  Negative for heartburn.        No dentures, no missing teeth or swallowing issues    Neurological:  Positive for tremors and headaches (occasional mid afternoon). Negative for dizziness.   Psychiatric/Behavioral:  The patient does not have insomnia.        Past Medical History:   Diagnosis Date    Anxiety     Arthritis     osteo    Chickenpox     Complex tear of medial meniscus of right knee, initial encounter 09/28/2018    COVID-19 01/2021    Daytime sleepiness     Depression     Dizziness     Genital herpes     Has valtrex PRN    High cholesterol 2019    On a statin much lower now    Insomnia     Nasal drainage     Pain     Right knee    Painful joint     Positive PPD     Prediabetes 09/2021    Restless leg syndrome     Snoring 11/02/2023    Wears glasses        Past Surgical History:   Procedure Laterality Date    PB KNEE SCOPE,MED OR LAT MENIS REPAIR Left 02/22/2023    Procedure: Knee arthroscopy with  versus partial medial meniscectomy, chondroplasty;  Surgeon: Aubrey Noonan M.D.;  Location: [facility];  Service: Orthopedics    GANGLION EXCISION Left 02/22/2023    Procedure: EXCISION, GANGLION CYST;  Surgeon: Aubrey Noonan M.D.;  Location: [facility];  Service: Orthopedics    KNEE ARTHROSCOPY Right 09/28/2018    Procedure: KNEE ARTHROSCOPY;  Surgeon: Aubrey Noonan M.D.;  Location: [facility];  Service: Orthopedics    MENISCECTOMY, KNEE, MEDIAL Right 09/28/2018     Procedure: MEDIAL MENISCECTOMY - PARTIAL;  Surgeon: Aubrey Noonan M.D.;  Location: [facility];  Service: Orthopedics    CHONDROPLASTY Right 09/28/2018    Procedure: CHONDROPLASTY - AND GOMES;  Surgeon: Aubrey Noonan M.D.;  Location: SURGERY [facility];  Service: Orthopedics    DENISE BY LAPAROSCOPY  2008    HAND SURGERY Right 2000    right thumb ligament reattachement    HYSTERECTOMY, VAGINAL  2000    TONSILLECTOMY  1966    ARTHROSCOPY, KNEE      CHOLECYSTECTOMY      GYN SURGERY  2003    Partial hysterectomy    HYSTERECTOMY LAPAROSCOPY      OTHER  1999    Right thumb pinned after game keeper fracture    OTHER ABDOMINAL SURGERY  2001    Cholecystectomy    PRIMARY C SECTION         Family History   Problem Relation Age of Onset    Clotting Disorder Sister         factor V    Heart Disease Sister         Heart murmur as a child    Thyroid Sister     Other Sister         parathyroidectomy     Kidney stones Sister     Hypertension Mother         Taking medication    Hypertension Father         Taking medication    Cancer Father         Prostate    Kidney stones Father     Diabetes Maternal Grandmother         Alcoholic    Cancer Maternal Grandmother         Abdominal    Alcohol abuse Maternal Grandmother     Alcohol abuse Maternal Grandfather     Diabetes Maternal Grandfather         Alcoholic    Other Maternal Grandfather         liver disease    Alzheimer's Disease Maternal Aunt        Social History     Socioeconomic History    Marital status:      Spouse name: Not on file    Number of children: Not on file    Years of education: Not on file    Highest education level: Associate degree: occupational, technical, or vocational program   Occupational History    Not on file   Tobacco Use    Smoking status: Never    Smokeless tobacco: Never   Vaping Use    Vaping Use: Never used   Substance and Sexual Activity    Alcohol use: Not Currently     Comment: Nothing since 1/2021    Drug use: No     Sexual activity: Not Currently     Partners: Male     Birth control/protection: Other-See Comments     Comment: hysterectomy    Other Topics Concern    Not on file   Social History Narrative    Not on file     Social Determinants of Health     Financial Resource Strain: Low Risk  (10/29/2023)    Overall Financial Resource Strain (CARDIA)     Difficulty of Paying Living Expenses: Not hard at all   Food Insecurity: No Food Insecurity (10/29/2023)    Hunger Vital Sign     Worried About Running Out of Food in the Last Year: Never true     Ran Out of Food in the Last Year: Never true   Transportation Needs: No Transportation Needs (10/29/2023)    PRAPARE - Transportation     Lack of Transportation (Medical): No     Lack of Transportation (Non-Medical): No   Physical Activity: Insufficiently Active (10/29/2023)    Exercise Vital Sign     Days of Exercise per Week: 2 days     Minutes of Exercise per Session: 20 min   Stress: Stress Concern Present (10/29/2023)    [employer] [place] [employer] - Occupational Stress Questionnaire     Feeling of Stress : Very much   Social Connections: Moderately Isolated (10/29/2023)    Social Connection and Isolation Panel [NHANES]     Frequency of Communication with Friends and Family: Three times a week     Frequency of Social Gatherings with Friends and Family: More than three times a week     Attends [employer] Services: 1 to 4 times per year     Active Member of Clubs or Organizations: No     Attends Club or Organization Meetings: Patient declined     Marital Status:    Intimate Partner Violence: Not on file   Housing Stability: Low Risk  (10/29/2023)    Housing Stability Vital Sign     Unable to Pay for Housing in the Last Year: No     Number of Places Lived in the Last Year: 1     Unstable Housing in the Last Year: No       Allergies as of 04/09/2024 - Reviewed 04/09/2024   Allergen Reaction Noted    Codeine Vomiting 10/24/2012          Current medications as of  today   Current Outpatient Medications   Medication Sig Dispense Refill    rosuvastatin (CRESTOR) 10 MG Tab TAKE 1 TABLET BY MOUTH EVERY EVENING. DUE FOR APPOINTMENT WITH PCP FOR FURTHER REFILLS 90 Tablet 0    sertraline (ZOLOFT) 100 MG Tab Take 1 Tablet by mouth every day. 90 Tablet 3    traZODone (DESYREL) 50 MG Tab Take 1 Tablet by mouth at bedtime as needed for Sleep. Take 30 minutes before bedtime. 90 Tablet 3    tizanidine (ZANAFLEX) 2 MG capsule TAKE 2 CAPSULES BY MOUTH 3 TIMES A DAY AS NEEDED (MUSCLE SPASM). 30 Capsule 2    cholecalciferol (D3 5000) 5000 UNIT Cap Take 5,000 Units by mouth every day.      Multiple Vitamins-Minerals (MULTIVITAMIN GUMMIES WOMENS PO) Take  by mouth every day.      valacyclovir (VALTREX) 1 GM Tab TAKE 0.5 TABS BY MOUTH 2 TIMES A DAY AS NEEDED. 90 Tablet 1     No current facility-administered medications for this visit.         Physical Exam:   Gen:           Alert and oriented, No apparent distress. Mood and affect appropriate, normal interaction with examiner.   Hearing:     Grossly intact.  Nose:          Normal, no lesions or deformities.  Dentition:    Good dentition.   Oropharynx:   Tongue normal, posterior pharynx without erythema or exudate.  Mallampati Classification: IV  Neck:        Supple, trachea midline, no masses.  Respiratory Effort: No intercostal retractions or use of accessory muscles.   Gait and Station: Normal.  Digits and Nails: No clubbing, cyanosis, petechiae, or nodes.   Skin:        No rashes, lesions or ulcers noted.               Ext:           No cyanosis or edema.      Immunizations:  Flu: 10/30/2023  Pfizer booster SARS-CoV-2 vaccine: 11/7/2022  Pfizer SARS CoV2 Vaccine: 4/13/2022, 1/10/2021, 12/21/2020    Assessment / Plan:  1. TYSHAWN (obstructive sleep apnea)    Reviewed first compliance, demonstrating use and benefit.  Improved mask leak with hybrid fullface mask.  Consider CPAP pillow for stomach sleeping.  Reviewed equipment cleaning, patient  reminded to use distilled water only in humidifier chamber, reviewed supply replacement schedule.  Patient to follow-up in 1 year or sooner if needed.    2. Obesity (BMI 30-39.9)    Elevated BMI: Discussion regarding impact central adiposity on pulmonary function.  Encouraged to increase activity as tolerated and monitor nutritional intake.          Follow-up:   Return in about 1 year (around 4/9/2025) for Return with Abimbola Baum PA-C.    Please note that this dictation was created using voice recognition software. I have made every reasonable attempt to correct obvious errors, but it is possible there are errors of grammar and possibly content that I did not discover before finalizing the note.

## 2024-04-09 NOTE — PATIENT INSTRUCTIONS
1-reviewed first compliance   2-improved mask leak with hybrid full face mask  3-consider cpap pillow for stomach sleep  4-Today we reviewed equipment cleaning  once weekly minimum  mask, tubing and water chamber  use dedicated container  use mild soap and water  SoClean or other ozone  are not recommended  white vinegar and water solution is no longer recommended  hang tubing to dry  mask sanitizing wipes are an option for use   5-As a reminder use distilled water only in humidifier chamber.    6-Equipment replacement schedule : Mask cushion every month, Head gear every 6 months, Tubing every 3 months, Ultra-fine filters 2 times per month, Humidifier chamber every 6 months   7-once a year follow up, sooner if needed

## 2024-05-09 NOTE — ASSESSMENT & PLAN NOTE
She reports that the back pain it is improving.  She is using zanaflex at bedtime that does help. She is raising the work table with IV starts and practicing good body mechanics at work.   hard copy, drawn during this pregnancy

## 2024-05-15 ENCOUNTER — OFFICE VISIT (OUTPATIENT)
Dept: URGENT CARE | Facility: PHYSICIAN GROUP | Age: 59
End: 2024-05-15
Payer: COMMERCIAL

## 2024-05-15 ENCOUNTER — HOSPITAL ENCOUNTER (OUTPATIENT)
Facility: MEDICAL CENTER | Age: 59
End: 2024-05-15
Attending: REGISTERED NURSE
Payer: COMMERCIAL

## 2024-05-15 VITALS
SYSTOLIC BLOOD PRESSURE: 110 MMHG | TEMPERATURE: 97.4 F | OXYGEN SATURATION: 96 % | BODY MASS INDEX: 39.07 KG/M2 | RESPIRATION RATE: 20 BRPM | WEIGHT: 199 LBS | HEART RATE: 84 BPM | DIASTOLIC BLOOD PRESSURE: 64 MMHG | HEIGHT: 60 IN

## 2024-05-15 DIAGNOSIS — N30.01 ACUTE CYSTITIS WITH HEMATURIA: ICD-10-CM

## 2024-05-15 LAB
APPEARANCE UR: NORMAL
BILIRUB UR STRIP-MCNC: NEGATIVE MG/DL
COLOR UR AUTO: YELLOW
GLUCOSE UR STRIP.AUTO-MCNC: NEGATIVE MG/DL
KETONES UR STRIP.AUTO-MCNC: NEGATIVE MG/DL
LEUKOCYTE ESTERASE UR QL STRIP.AUTO: NORMAL
NITRITE UR QL STRIP.AUTO: NEGATIVE
PH UR STRIP.AUTO: 6 [PH] (ref 5–8)
PROT UR QL STRIP: 30 MG/DL
RBC UR QL AUTO: NORMAL
SP GR UR STRIP.AUTO: 1.02
UROBILINOGEN UR STRIP-MCNC: 1 MG/DL

## 2024-05-15 PROCEDURE — 3074F SYST BP LT 130 MM HG: CPT | Performed by: REGISTERED NURSE

## 2024-05-15 PROCEDURE — 3078F DIAST BP <80 MM HG: CPT | Performed by: REGISTERED NURSE

## 2024-05-15 PROCEDURE — 81002 URINALYSIS NONAUTO W/O SCOPE: CPT | Performed by: REGISTERED NURSE

## 2024-05-15 PROCEDURE — 99213 OFFICE O/P EST LOW 20 MIN: CPT | Performed by: REGISTERED NURSE

## 2024-05-15 RX ORDER — CEPHALEXIN 500 MG/1
500 CAPSULE ORAL 2 TIMES DAILY
Qty: 14 CAPSULE | Refills: 0 | Status: SHIPPED | OUTPATIENT
Start: 2024-05-15 | End: 2024-05-22

## 2024-05-15 ASSESSMENT — ENCOUNTER SYMPTOMS
FEVER: 0
NAUSEA: 0
ABDOMINAL PAIN: 0
VOMITING: 0
CHILLS: 0
SHORTNESS OF BREATH: 0
DIZZINESS: 0
FLANK PAIN: 0

## 2024-05-15 ASSESSMENT — FIBROSIS 4 INDEX: FIB4 SCORE: 1.64

## 2024-05-15 NOTE — PROGRESS NOTES
Subjective:   ISABELLE WELLS is a 58 y.o. female who presents for UTI (Pressure ,bladder pain ,frequency blood some fever x 2 days )      HPI  Urinary symptoms x 2 days. Including frequency, bladder pain, hematuria, and dysuria. Subjective fever last night.    Hx of uncomplicated UTI's? Yes, 6 months ago  Hx of complicated UTI's? no  Hx of hospitalizations from UTI? no  Hx of hard to treat UTI or antibiotic resistant infections? no    No vaginal discharge or odor.  No pelvic pain.  Tolerating p.o.  No concern for STIs    Review of Systems   Constitutional:  Negative for chills, fever and malaise/fatigue.   Respiratory:  Negative for shortness of breath.    Cardiovascular:  Negative for chest pain.   Gastrointestinal:  Negative for abdominal pain, nausea and vomiting.   Genitourinary:  Positive for dysuria, frequency and urgency. Negative for flank pain.   Neurological:  Negative for dizziness.       Allergies   Allergen Reactions    Codeine Vomiting       Patient Active Problem List    Diagnosis Date Noted    Other insomnia 11/02/2023    Tremor of both hands 11/02/2023    Snoring 11/02/2023    Obesity (BMI 30-39.9) 10/30/2023    Complex tear of medial meniscus of left knee as current injury 01/19/2023    Ganglion of left knee 01/19/2023    Plantar fasciitis 10/04/2022    Chronic bilateral back pain 02/09/2022    Elevated alkaline phosphatase level 09/09/2021    Vitamin D deficiency 01/11/2021    Prediabetes 01/11/2021    Dyslipidemia 01/11/2021    Cervical radiculopathy 10/08/2019    HSV infection 02/11/2019    Anxiety and depression 02/11/2019       Current Outpatient Medications Ordered in Epic   Medication Sig Dispense Refill    cephALEXin (KEFLEX) 500 MG Cap Take 1 Capsule by mouth 2 times a day for 7 days. 14 Capsule 0    rosuvastatin (CRESTOR) 10 MG Tab TAKE 1 TABLET BY MOUTH EVERY EVENING. DUE FOR APPOINTMENT WITH PCP FOR FURTHER REFILLS 90 Tablet 0    sertraline (ZOLOFT) 100 MG Tab Take 1 Tablet by  mouth every day. 90 Tablet 3    traZODone (DESYREL) 50 MG Tab Take 1 Tablet by mouth at bedtime as needed for Sleep. Take 30 minutes before bedtime. 90 Tablet 3    tizanidine (ZANAFLEX) 2 MG capsule TAKE 2 CAPSULES BY MOUTH 3 TIMES A DAY AS NEEDED (MUSCLE SPASM). 30 Capsule 2    cholecalciferol (D3 5000) 5000 UNIT Cap Take 5,000 Units by mouth every day.      Multiple Vitamins-Minerals (MULTIVITAMIN GUMMIES WOMENS PO) Take  by mouth every day.      valacyclovir (VALTREX) 1 GM Tab TAKE 0.5 TABS BY MOUTH 2 TIMES A DAY AS NEEDED. 90 Tablet 1     No current Epic-ordered facility-administered medications on file.       Past Surgical History:   Procedure Laterality Date    PB KNEE SCOPE,MED OR LAT MENIS REPAIR Left 02/22/2023    Procedure: Knee arthroscopy with  versus partial medial meniscectomy, chondroplasty;  Surgeon: Aubrey Noonan M.D.;  Location: [facility];  Service: Orthopedics    GANGLION EXCISION Left 02/22/2023    Procedure: EXCISION, GANGLION CYST;  Surgeon: Aubrey Noonan M.D.;  Location: [facility];  Service: Orthopedics    KNEE ARTHROSCOPY Right 09/28/2018    Procedure: KNEE ARTHROSCOPY;  Surgeon: Aubrey Noonan M.D.;  Location: [facility];  Service: Orthopedics    MENISCECTOMY, KNEE, MEDIAL Right 09/28/2018    Procedure: MEDIAL MENISCECTOMY - PARTIAL;  Surgeon: Aubrey Noonan M.D.;  Location: [facility];  Service: Orthopedics    CHONDROPLASTY Right 09/28/2018    Procedure: CHONDROPLASTY - AND GOMES;  Surgeon: Aubrey Noonan M.D.;  Location: [facility];  Service: Orthopedics    DENISE BY LAPAROSCOPY  2008    HAND SURGERY Right 2000    right thumb ligament reattachement    HYSTERECTOMY, VAGINAL  2000    TONSILLECTOMY  1966    ARTHROSCOPY, KNEE      CHOLECYSTECTOMY      GYN SURGERY  2003    Partial hysterectomy    HYSTERECTOMY LAPAROSCOPY      OTHER  1999    Right thumb pinned after game keeper fracture    OTHER ABDOMINAL SURGERY  2001     Cholecystectomy    PRIMARY C SECTION         Social History     Tobacco Use    Smoking status: Never    Smokeless tobacco: Never   Vaping Use    Vaping status: Never Used   Substance Use Topics    Alcohol use: Not Currently     Comment: Nothing since 1/2021    Drug use: No       family history includes Alcohol abuse in her maternal grandfather and maternal grandmother; Alzheimer's Disease in her maternal aunt; Cancer in her father and maternal grandmother; Clotting Disorder in her sister; Diabetes in her maternal grandfather and maternal grandmother; Heart Disease in her sister; Hypertension in her father and mother; Kidney stones in her father and sister; Other in her maternal grandfather and sister; Thyroid in her sister.     Problem list, medications, and allergies reviewed by myself today in Epic.     Objective:   /64   Pulse 84   Temp 36.3 °C (97.4 °F) (Temporal)   Resp 20   Ht 1.524 m (5')   Wt 90.3 kg (199 lb)   SpO2 96%   BMI 38.86 kg/m²     Physical Exam  Vitals and nursing note reviewed.   Constitutional:       General: She is not in acute distress.     Appearance: Normal appearance. She is not ill-appearing, toxic-appearing or diaphoretic.   HENT:      Head: Normocephalic and atraumatic.      Right Ear: External ear normal.      Left Ear: External ear normal.      Nose: Nose normal.   Eyes:      Conjunctiva/sclera: Conjunctivae normal.   Cardiovascular:      Rate and Rhythm: Normal rate and regular rhythm.      Heart sounds: Normal heart sounds.   Pulmonary:      Effort: Pulmonary effort is normal. No respiratory distress.      Breath sounds: Normal breath sounds. No wheezing, rhonchi or rales.   Abdominal:      General: Abdomen is flat. There is no distension.      Palpations: Abdomen is soft.      Tenderness: There is no abdominal tenderness. There is no right CVA tenderness, left CVA tenderness, guarding or rebound.      Comments: No suprapubic tenderness   Musculoskeletal:      Cervical  back: Normal range of motion and neck supple.   Skin:     General: Skin is warm and dry.   Neurological:      General: No focal deficit present.      Mental Status: She is alert and oriented to person, place, and time. Mental status is at baseline.   Psychiatric:         Mood and Affect: Mood normal.         Behavior: Behavior normal.         Thought Content: Thought content normal.         Judgment: Judgment normal.         Lab Results/POC Test Results   Results for orders placed or performed in visit on 05/15/24   POCT Urinalysis   Result Value Ref Range    POC Color YELLOW Negative    POC Appearance CLOUDY Negative    POC Glucose NEGATIVE Negative mg/dL    POC Bilirubin NEGATIVE Negative mg/dL    POC Ketones NEGATIVE Negative mg/dL    POC Specific Gravity 1.020 <1.005 - >1.030    POC Blood TRACE-INTACT Negative    POC Urine PH 6.0 5.0 - 8.0    POC Protein 30 Negative mg/dL    POC Urobiligen 1.0 Negative (0.2) mg/dL    POC Nitrites NEGATIVE Negative    POC Leukocyte Esterase SMALL Negative           Assessment/Plan:     I personally reviewed prior external notes and test results pertinent to today's visit as well as additional imaging and testing completed in clinic today. Shared decision-making was utilized with patient for treatment plan.     1. Acute cystitis with hematuria  cephALEXin (KEFLEX) 500 MG Cap    POCT Urinalysis    URINE CULTURE(NEW)        Testing: Positive for blood, leuks  Vital signs: WNL  Plan/MDM: Did have UTI 6 months ago and at that time there was some concern for pyelonephritis given some flank pain but she was put on Cipro and symptoms resolved.  She is only on day 2 of symptoms and they are primarily uncomplicated she did report a subjective fever but that resolved.  No vaginal discharge or odor.  Exam is unremarkable without abdominal, suprapubic or CVA tenderness.  Moist mucous membranes.  History and exam findings consistent with uncomplicated UTI    Will start on cephalexin.    We  will send urine culture, modify plan if needed.    She is instructed to take the full course of antibiotics.    Significantly increase fluid intake.    May use over-the-counter medications including Motrin or Azo if tolerated.    We reviewed signs and symptoms of worsening infection at length.      Medication discussed included indication for use and the potential benefits and side effects. Education was provided regarding the aforementioned assessments. All of the patient's questions were answered to their satisfaction at the time of discharge. Patient was encouraged to monitor symptoms closely and we reviewed the signs and symptoms which would warrant concern and mandate seeking a higher level of service through the emergency department. Patient stated agreement and understanding of this plan of care.     Please note that this dictation was created using voice recognition software. I have made every reasonable attempt to correct obvious errors, but I expect that there are errors of grammar and possibly content that I did not discover before finalizing the note.    This note was electronically signed by HELEN Reddy

## 2024-05-16 DIAGNOSIS — B37.31 VULVOVAGINAL CANDIDIASIS: ICD-10-CM

## 2024-05-16 DIAGNOSIS — N30.01 ACUTE CYSTITIS WITH HEMATURIA: ICD-10-CM

## 2024-05-16 RX ORDER — FLUCONAZOLE 150 MG/1
150 TABLET ORAL DAILY
Qty: 1 TABLET | Refills: 0 | Status: SHIPPED | OUTPATIENT
Start: 2024-05-16

## 2024-05-19 LAB
BACTERIA UR CULT: NORMAL
SIGNIFICANT IND 70042: NORMAL
SITE SITE: NORMAL
SOURCE SOURCE: NORMAL

## 2024-05-31 ENCOUNTER — APPOINTMENT (OUTPATIENT)
Dept: BEHAVIORAL HEALTH | Facility: CLINIC | Age: 59
End: 2024-05-31
Payer: COMMERCIAL

## 2024-06-29 DIAGNOSIS — E78.5 DYSLIPIDEMIA: ICD-10-CM

## 2024-07-01 RX ORDER — ROSUVASTATIN CALCIUM 10 MG/1
10 TABLET, COATED ORAL EVERY EVENING
Qty: 90 TABLET | Refills: 3 | Status: SHIPPED | OUTPATIENT
Start: 2024-07-01

## 2024-07-25 ENCOUNTER — OFFICE VISIT (OUTPATIENT)
Dept: BEHAVIORAL HEALTH | Facility: CLINIC | Age: 59
End: 2024-07-25
Payer: COMMERCIAL

## 2024-07-25 DIAGNOSIS — F41.1 GENERALIZED ANXIETY DISORDER: ICD-10-CM

## 2024-07-25 PROCEDURE — 90791 PSYCH DIAGNOSTIC EVALUATION: CPT | Performed by: MARRIAGE & FAMILY THERAPIST

## 2024-07-25 ASSESSMENT — ANXIETY QUESTIONNAIRES
3. WORRYING TOO MUCH ABOUT DIFFERENT THINGS: SEVERAL DAYS
5. BEING SO RESTLESS THAT IT IS HARD TO SIT STILL: NOT AT ALL
2. NOT BEING ABLE TO STOP OR CONTROL WORRYING: NOT AT ALL
1. FEELING NERVOUS, ANXIOUS, OR ON EDGE: SEVERAL DAYS
6. BECOMING EASILY ANNOYED OR IRRITABLE: NOT AT ALL
GAD7 TOTAL SCORE: 4
4. TROUBLE RELAXING: MORE THAN HALF THE DAYS
7. FEELING AFRAID AS IF SOMETHING AWFUL MIGHT HAPPEN: NOT AT ALL
IF YOU CHECKED OFF ANY PROBLEMS ON THIS QUESTIONNAIRE, HOW DIFFICULT HAVE THESE PROBLEMS MADE IT FOR YOU TO DO YOUR WORK, TAKE CARE OF THINGS AT HOME, OR GET ALONG WITH OTHER PEOPLE: SOMEWHAT DIFFICULT

## 2024-07-25 ASSESSMENT — PATIENT HEALTH QUESTIONNAIRE - PHQ9
SUM OF ALL RESPONSES TO PHQ QUESTIONS 1-9: 3
5. POOR APPETITE OR OVEREATING: 1 - SEVERAL DAYS
CLINICAL INTERPRETATION OF PHQ2 SCORE: 1

## 2024-08-22 ENCOUNTER — APPOINTMENT (OUTPATIENT)
Dept: BEHAVIORAL HEALTH | Facility: CLINIC | Age: 59
End: 2024-08-22
Payer: COMMERCIAL

## 2024-08-24 ENCOUNTER — OFFICE VISIT (OUTPATIENT)
Dept: URGENT CARE | Facility: PHYSICIAN GROUP | Age: 59
End: 2024-08-24
Payer: COMMERCIAL

## 2024-08-24 VITALS
TEMPERATURE: 97.9 F | DIASTOLIC BLOOD PRESSURE: 72 MMHG | BODY MASS INDEX: 38.87 KG/M2 | SYSTOLIC BLOOD PRESSURE: 110 MMHG | WEIGHT: 198 LBS | OXYGEN SATURATION: 95 % | RESPIRATION RATE: 16 BRPM | HEIGHT: 60 IN | HEART RATE: 76 BPM

## 2024-08-24 DIAGNOSIS — U07.1 COVID-19: ICD-10-CM

## 2024-08-24 PROCEDURE — 3074F SYST BP LT 130 MM HG: CPT | Performed by: PHYSICIAN ASSISTANT

## 2024-08-24 PROCEDURE — 99213 OFFICE O/P EST LOW 20 MIN: CPT | Performed by: PHYSICIAN ASSISTANT

## 2024-08-24 PROCEDURE — 3078F DIAST BP <80 MM HG: CPT | Performed by: PHYSICIAN ASSISTANT

## 2024-08-24 RX ORDER — DEXTROMETHORPHAN HYDROBROMIDE AND PROMETHAZINE HYDROCHLORIDE 15; 6.25 MG/5ML; MG/5ML
5 SYRUP ORAL EVERY 6 HOURS PRN
Qty: 118 ML | Refills: 0 | Status: SHIPPED | OUTPATIENT
Start: 2024-08-24 | End: 2024-08-31

## 2024-08-24 RX ORDER — BENZONATATE 100 MG/1
100 CAPSULE ORAL 3 TIMES DAILY PRN
Qty: 30 CAPSULE | Refills: 0 | Status: SHIPPED | OUTPATIENT
Start: 2024-08-24 | End: 2024-09-03

## 2024-08-24 RX ORDER — AZELASTINE 1 MG/ML
1 SPRAY, METERED NASAL 2 TIMES DAILY
Qty: 30 ML | Refills: 0 | Status: SHIPPED | OUTPATIENT
Start: 2024-08-24

## 2024-08-24 ASSESSMENT — ENCOUNTER SYMPTOMS
FEVER: 1
SHORTNESS OF BREATH: 0
ABDOMINAL PAIN: 0
DIARRHEA: 0
NAUSEA: 0
HEADACHES: 1
COUGH: 1
EYE PAIN: 0
SPUTUM PRODUCTION: 1
SORE THROAT: 1
CHILLS: 1
MYALGIAS: 1
VOMITING: 0
CONSTIPATION: 0

## 2024-08-24 ASSESSMENT — FIBROSIS 4 INDEX: FIB4 SCORE: 1.67

## 2024-08-24 NOTE — PROGRESS NOTES
Subjective:   Toya Shields is a 59 y.o. female who presents for Coronavirus Screening (Took at home test and came back positive today, sinus drainage, fever at night, headache, fatigue, body aches, lymph nodes inflamed, cough, mucous X 4 days )      This is a pleasant 59-year-old female who 4 days ago returning from a trip pending began feeling ill with bodyaches generalized malaise and fatigue, profound runny nose, postnasal drip.  She said waxing and waning subjective fevers headache and bodyaches.  She has had an intermittent productive cough.  She denies any difficulty breathing.  She has no history of asthma or COPD.  She has been taking over-the-counter Etelvina-Ada cold and flu.  She did take a home COVID test which was positive earlier    Review of Systems   Constitutional:  Positive for chills, fever and malaise/fatigue.   HENT:  Positive for congestion and sore throat. Negative for ear pain.    Eyes:  Negative for pain.   Respiratory:  Positive for cough and sputum production. Negative for shortness of breath.    Cardiovascular:  Negative for chest pain.   Gastrointestinal:  Negative for abdominal pain, constipation, diarrhea, nausea and vomiting.   Genitourinary:  Negative for dysuria.   Musculoskeletal:  Positive for myalgias.   Skin:  Negative for rash.   Neurological:  Positive for headaches.       Medications, Allergies, and current problem list reviewed today in Epic.     Objective:     /72 (BP Location: Left arm, Patient Position: Sitting, BP Cuff Size: Adult)   Pulse 76   Temp 36.6 °C (97.9 °F) (Temporal)   Resp 16   Ht 1.524 m (5')   Wt 89.8 kg (198 lb)   SpO2 95%     Physical Exam  Vitals reviewed.   Constitutional:       Appearance: Normal appearance.   HENT:      Head: Normocephalic and atraumatic.      Right Ear: Tympanic membrane, ear canal and external ear normal.      Left Ear: Tympanic membrane, ear canal and external ear normal.      Nose: Congestion and rhinorrhea  present.      Mouth/Throat:      Mouth: Mucous membranes are moist.      Pharynx: Oropharynx is clear.   Eyes:      Conjunctiva/sclera: Conjunctivae normal.      Pupils: Pupils are equal, round, and reactive to light.   Cardiovascular:      Rate and Rhythm: Normal rate and regular rhythm.   Pulmonary:      Effort: Pulmonary effort is normal.      Breath sounds: Normal breath sounds.   Musculoskeletal:         General: Normal range of motion.      Cervical back: Normal range of motion.   Lymphadenopathy:      Cervical: No cervical adenopathy.   Skin:     General: Skin is warm and dry.      Capillary Refill: Capillary refill takes less than 2 seconds.   Neurological:      Mental Status: She is alert and oriented to person, place, and time.         Assessment/Plan:     Diagnosis and associated orders:     1. COVID-19  promethazine-dextromethorphan (PROMETHAZINE-DM) 6.25-15 MG/5ML syrup    benzonatate (TESSALON) 100 MG Cap    azelastine (ASTELIN) 137 MCG/SPRAY nasal spray         Comments/MDM:     Discussed risk versus benefits of Paxlovid.  Patient had some hesitancy about potential risk versus benefits of Paxlovid therapy.  Given her hesitancy we we decided that she is likely on the tail end of her highly symptomatic phase and may benefit from supportive care.  Warned about sedation from the promethazine with eczema fourth and cough medicine.  Recommend a oral antihistamine in addition to the azelastine Tessalon.  Discussed return precautions isolation and ER precaution         Differential diagnosis, natural history, supportive care, and indications for immediate follow-up discussed.    Advised the patient to follow-up with the primary care physician for recheck, reevaluation, and consideration of further management.    Please note that this dictation was created using voice recognition software. I have made a reasonable attempt to correct obvious errors, but I expect that there are errors of grammar and possibly  content that I did not discover before finalizing the note.    This note was electronically signed by Carlton Lujan PA-C

## 2024-09-17 ENCOUNTER — HOSPITAL ENCOUNTER (OUTPATIENT)
Dept: RADIOLOGY | Facility: MEDICAL CENTER | Age: 59
End: 2024-09-17
Attending: NURSE PRACTITIONER
Payer: COMMERCIAL

## 2024-09-17 ENCOUNTER — OFFICE VISIT (OUTPATIENT)
Dept: BEHAVIORAL HEALTH | Facility: CLINIC | Age: 59
End: 2024-09-17
Payer: COMMERCIAL

## 2024-09-17 DIAGNOSIS — Z12.31 VISIT FOR SCREENING MAMMOGRAM: ICD-10-CM

## 2024-09-17 DIAGNOSIS — F41.1 GENERALIZED ANXIETY DISORDER: ICD-10-CM

## 2024-09-17 PROCEDURE — 77067 SCR MAMMO BI INCL CAD: CPT

## 2024-09-17 PROCEDURE — 90834 PSYTX W PT 45 MINUTES: CPT | Performed by: MARRIAGE & FAMILY THERAPIST

## 2024-09-17 NOTE — PROGRESS NOTES
[facility]   Psychotherapy Progress Note        Name: Toya Shields  MRN: [id number]  : 1965  Age: 59 y.o.  Date of assessment: 2024  PCP: HELEN Chung  Persons in attendance: Patient  Total session time: 50 minutes      Topics addressed in psychotherapy include: Aida reports she was successful in using the CBT tools we reviewed last session to challenge her automatic negative thoughts around quitting drinking coke everyday and she was successful in that. She has gotten it down to where she will just have a coke as a special treat when she goes out for dinner. She reports she does feel anxious and overwhelmed when she is out in crowded public places. We discussed her thoughts and fears when this is happening, and discussed history of experiences when her exhusband who was toxic enjoyed going out to public places and Aida did not. Intervention: We reviewed two breathing activities for distress tolerance that promote grounding, and we reviewed being mode vs doing mode. Next session, if there isn't anything more pressing we will review an exercise to help Aida identify self-care activities that will help her get into doing mode more often.     Objective Observations:   Participation:Active verbal participation, Attentive, Engaged, and Open to feedback   Grooming:Casual and Neat   Cognition:Alert and Fully Oriented   Eye Contact:Good   Mood:Euthymic   Affect:Flexible, Full range, Expansive, and Congruent with content   Thought Process:Logical and Goal-directed   Speech:Rate within normal limits and Volume within normal limits    Current Risk:   Suicide: low   Homicide: low   Self-Harm: low     Care Plan Updated: Yes    Does patient express agreement with the above plan? Yes     Diagnosis:  1. Generalized anxiety disorder        Referral appointment(s) scheduled? No       JOHANN Melendez

## 2024-10-01 ENCOUNTER — APPOINTMENT (OUTPATIENT)
Dept: BEHAVIORAL HEALTH | Facility: CLINIC | Age: 59
End: 2024-10-01
Payer: COMMERCIAL

## 2024-10-21 ENCOUNTER — OFFICE VISIT (OUTPATIENT)
Dept: BEHAVIORAL HEALTH | Facility: CLINIC | Age: 59
End: 2024-10-21
Payer: COMMERCIAL

## 2024-10-21 DIAGNOSIS — F41.1 GENERALIZED ANXIETY DISORDER: ICD-10-CM

## 2024-10-21 PROCEDURE — 90834 PSYTX W PT 45 MINUTES: CPT | Performed by: MARRIAGE & FAMILY THERAPIST

## 2024-11-12 ENCOUNTER — APPOINTMENT (OUTPATIENT)
Dept: BEHAVIORAL HEALTH | Facility: CLINIC | Age: 59
End: 2024-11-12
Payer: COMMERCIAL

## 2024-11-25 ENCOUNTER — APPOINTMENT (OUTPATIENT)
Dept: BEHAVIORAL HEALTH | Facility: CLINIC | Age: 59
End: 2024-11-25
Payer: COMMERCIAL

## 2024-12-20 ENCOUNTER — OFFICE VISIT (OUTPATIENT)
Dept: URGENT CARE | Facility: PHYSICIAN GROUP | Age: 59
End: 2024-12-20
Payer: COMMERCIAL

## 2024-12-20 VITALS
TEMPERATURE: 97.2 F | HEIGHT: 60 IN | HEART RATE: 76 BPM | OXYGEN SATURATION: 97 % | RESPIRATION RATE: 14 BRPM | WEIGHT: 191.8 LBS | SYSTOLIC BLOOD PRESSURE: 122 MMHG | BODY MASS INDEX: 37.66 KG/M2 | DIASTOLIC BLOOD PRESSURE: 80 MMHG

## 2024-12-20 DIAGNOSIS — J32.9 RHINOSINUSITIS: ICD-10-CM

## 2024-12-20 PROCEDURE — 3079F DIAST BP 80-89 MM HG: CPT | Performed by: FAMILY MEDICINE

## 2024-12-20 PROCEDURE — 99213 OFFICE O/P EST LOW 20 MIN: CPT | Performed by: FAMILY MEDICINE

## 2024-12-20 PROCEDURE — 3074F SYST BP LT 130 MM HG: CPT | Performed by: FAMILY MEDICINE

## 2024-12-20 RX ORDER — FLUCONAZOLE 150 MG/1
150 TABLET ORAL DAILY
Qty: 2 TABLET | Refills: 0 | Status: SHIPPED | OUTPATIENT
Start: 2024-12-20

## 2024-12-20 ASSESSMENT — FIBROSIS 4 INDEX: FIB4 SCORE: 1.67

## 2024-12-21 NOTE — PROGRESS NOTES
Subjective     Aida Shields is a 59 y.o. female who presents with Sinus Problem (Symptoms have been going on for roughly 1 month )            1 month sinus pressure and drainage.  PMH sinusitis.  No sinus surgeries.  No relief with OTC medication.  No fever.  No other aggravating alleviating factors.        Review of Systems   Constitutional:  Negative for malaise/fatigue and weight loss.   Eyes:  Negative for discharge and redness.   Gastrointestinal:  Negative for nausea and vomiting.   Musculoskeletal:  Negative for joint pain and myalgias.   Skin:  Negative for itching and rash.              Objective     /80 (BP Location: Right arm, Patient Position: Sitting, BP Cuff Size: Adult)   Pulse 76   Temp 36.2 °C (97.2 °F) (Temporal)   Resp 14   Ht 1.524 m (5')   Wt 87 kg (191 lb 12.8 oz)   SpO2 97%   BMI 37.46 kg/m²      Physical Exam  Constitutional:       General: She is not in acute distress.     Appearance: She is well-developed. She is obese.   HENT:      Head: Normocephalic and atraumatic.      Right Ear: Tympanic membrane normal.      Left Ear: Tympanic membrane normal.      Nose: Congestion present.      Mouth/Throat:      Comments: Purulent PND  Eyes:      Conjunctiva/sclera: Conjunctivae normal.   Cardiovascular:      Rate and Rhythm: Normal rate and regular rhythm.      Heart sounds: Normal heart sounds. No murmur heard.  Pulmonary:      Effort: Pulmonary effort is normal.      Breath sounds: Normal breath sounds. No wheezing.   Skin:     General: Skin is warm and dry.      Findings: No rash.   Neurological:      Mental Status: She is alert.                             Assessment & Plan        1. Rhinosinusitis  amoxicillin-clavulanate (AUGMENTIN) 875-125 MG Tab    fluconazole (DIFLUCAN) 150 MG tablet        Nasal saline.  Nasal corticosteroid.    Differential diagnosis, natural history, supportive care, and indications for immediate follow-up were discussed.

## 2024-12-25 ASSESSMENT — ENCOUNTER SYMPTOMS
WEIGHT LOSS: 0
EYE REDNESS: 0
MYALGIAS: 0
NAUSEA: 0
EYE DISCHARGE: 0
VOMITING: 0

## 2025-02-26 ENCOUNTER — HOSPITAL ENCOUNTER (OUTPATIENT)
Dept: RADIOLOGY | Facility: MEDICAL CENTER | Age: 60
End: 2025-02-26
Attending: PHYSICIAN ASSISTANT
Payer: COMMERCIAL

## 2025-02-26 ENCOUNTER — OFFICE VISIT (OUTPATIENT)
Dept: URGENT CARE | Facility: PHYSICIAN GROUP | Age: 60
End: 2025-02-26
Payer: COMMERCIAL

## 2025-02-26 VITALS
WEIGHT: 190 LBS | RESPIRATION RATE: 16 BRPM | DIASTOLIC BLOOD PRESSURE: 62 MMHG | BODY MASS INDEX: 37.3 KG/M2 | HEIGHT: 60 IN | OXYGEN SATURATION: 94 % | SYSTOLIC BLOOD PRESSURE: 102 MMHG | TEMPERATURE: 101.8 F | HEART RATE: 86 BPM

## 2025-02-26 DIAGNOSIS — B37.9 ANTIBIOTIC-INDUCED YEAST INFECTION: ICD-10-CM

## 2025-02-26 DIAGNOSIS — R09.89 RALES: ICD-10-CM

## 2025-02-26 DIAGNOSIS — F32.A ANXIETY AND DEPRESSION: ICD-10-CM

## 2025-02-26 DIAGNOSIS — F41.9 ANXIETY AND DEPRESSION: ICD-10-CM

## 2025-02-26 DIAGNOSIS — J18.9 PNEUMONIA OF RIGHT LOWER LOBE DUE TO INFECTIOUS ORGANISM: ICD-10-CM

## 2025-02-26 DIAGNOSIS — T36.95XA ANTIBIOTIC-INDUCED YEAST INFECTION: ICD-10-CM

## 2025-02-26 DIAGNOSIS — R05.1 ACUTE COUGH: ICD-10-CM

## 2025-02-26 LAB
FLUAV RNA SPEC QL NAA+PROBE: NEGATIVE
FLUBV RNA SPEC QL NAA+PROBE: NEGATIVE
RSV RNA SPEC QL NAA+PROBE: NEGATIVE
SARS-COV-2 RNA RESP QL NAA+PROBE: NEGATIVE

## 2025-02-26 PROCEDURE — 71046 X-RAY EXAM CHEST 2 VIEWS: CPT

## 2025-02-26 RX ORDER — BENZONATATE 100 MG/1
100 CAPSULE ORAL 3 TIMES DAILY PRN
Qty: 20 CAPSULE | Refills: 0 | Status: SHIPPED | OUTPATIENT
Start: 2025-02-26

## 2025-02-26 RX ORDER — FLUCONAZOLE 150 MG/1
TABLET ORAL
Qty: 2 TABLET | Refills: 0 | Status: SHIPPED | OUTPATIENT
Start: 2025-02-26

## 2025-02-26 RX ORDER — SERTRALINE HYDROCHLORIDE 100 MG/1
100 TABLET, FILM COATED ORAL DAILY
Qty: 90 TABLET | Refills: 0 | Status: SHIPPED | OUTPATIENT
Start: 2025-02-26

## 2025-02-26 RX ORDER — DOXYCYCLINE HYCLATE 100 MG
100 TABLET ORAL 2 TIMES DAILY
Qty: 14 TABLET | Refills: 0 | Status: SHIPPED | OUTPATIENT
Start: 2025-02-26 | End: 2025-03-05

## 2025-02-26 RX ORDER — ALBUTEROL SULFATE 90 UG/1
1-2 INHALANT RESPIRATORY (INHALATION) EVERY 4 HOURS PRN
Qty: 1 EACH | Refills: 0 | Status: SHIPPED | OUTPATIENT
Start: 2025-02-26

## 2025-02-26 ASSESSMENT — FIBROSIS 4 INDEX: FIB4 SCORE: 1.67

## 2025-02-26 NOTE — TELEPHONE ENCOUNTER
Requested Prescriptions     Signed Prescriptions Disp Refills    sertraline (ZOLOFT) 100 MG Tab 90 Tablet 0     Sig: TAKE 1 TABLET BY MOUTH EVERY DAY     Authorizing Provider: MARIELLA MARK A.P.R.N.

## 2025-02-26 NOTE — PROGRESS NOTES
Subjective:     Verbal consent was acquired by the patient to use [employer] ambient listening note generation during this visit     Toya Shields is a 59 y.o. female who presents for URI (Dizziness, cough, congestion, post nasal drip, was coughing up brown mucous but has subsided and now is clear, fever, chills, sinus pressure started last night  X 4 days )       History of Present Illness  The patient presents for evaluation of cough, sore throat, and fever.    She has been experiencing symptoms since Sunday, characterized by a persistent cough, sore throat, and expectoration of brownish sputum. The sputum has since transitioned to a smoky appearance. She reports intermittent fevers and body aches. She also reports mild dyspnea, particularly during ambulation, and a rattling sensation in her chest. She experiences coughing spasms at night, which have been disrupting her sleep. She has been managing these symptoms by elevating her bed slightly. She has a history of bronchitis but has never had pneumonia. She has previously used inhalers and steroids for her bronchitis. She has not received her pneumonia vaccine this year. She typically develops yeast infections when on antibiotics. She has a pulse oximeter at home. She has been off work from Saturday through Tuesday and called in sick today. She is scheduled to return to work next Wednesday. She has a history of COVID-19 infection twice but did not take Paxlovid during those episodes. She has a supply of cough pearls at home, which she found beneficial during her COVID-19 infection. She has been using Tylenol or ibuprofen for symptom management. She has been wearing a mask at work.    SOCIAL HISTORY  She works as a CT technician.    MEDICATIONS  Current: Tylenol, ibuprofen    IMMUNIZATIONS  She has not received her pneumonia vaccine this year.            Medications:  azelastine  D3 5000 Caps  fluconazole  MULTIVITAMIN GUMMIES WOMENS PO  rosuvastatin  Tabs  sertraline Tabs  traZODone Tabs    Allergies:             Codeine    Past Social Hx:  Toya Shields  reports that she has never smoked. She has never used smokeless tobacco. She reports that she does not currently use alcohol. She reports that she does not use drugs.           Problem list, medications, and allergies reviewed by myself today in Epic.     Objective:     /62 (BP Location: Right arm, Patient Position: Sitting, BP Cuff Size: Adult)   Pulse 86   Temp (!) 38.8 °C (101.8 °F) (Temporal)   Resp 16   Ht 1.524 m (5')   Wt 86.2 kg (190 lb)   SpO2 94%   BMI 37.11 kg/m²     Physical Exam  Vitals and nursing note reviewed.   Constitutional:       General: She is not in acute distress.     Appearance: She is well-developed. She is not ill-appearing or diaphoretic.   HENT:      Head: Normocephalic.      Right Ear: Tympanic membrane, ear canal and external ear normal.      Left Ear: Tympanic membrane, ear canal and external ear normal.      Nose: Mucosal edema and rhinorrhea present.      Mouth/Throat:      Pharynx: Posterior oropharyngeal erythema present.   Eyes:      General:         Right eye: No discharge.         Left eye: No discharge.      Conjunctiva/sclera: Conjunctivae normal.      Pupils: Pupils are equal, round, and reactive to light.   Cardiovascular:      Rate and Rhythm: Normal rate and regular rhythm.      Pulses: Normal pulses.      Heart sounds: Normal heart sounds. No murmur heard.     No friction rub.   Pulmonary:      Effort: Pulmonary effort is normal. No accessory muscle usage or respiratory distress.      Breath sounds: No stridor. Rales present. No wheezing or rhonchi.      Comments: Fine rales heard to right mid and lower lung base.  No wheezing.  Slightly decreased breath sounds heard on the right  Abdominal:      Palpations: Abdomen is soft.   Musculoskeletal:         General: Normal range of motion.      Cervical back: Normal range of motion.      Right lower  leg: No edema.      Left lower leg: No edema.   Lymphadenopathy:      Cervical: No cervical adenopathy.   Skin:     General: Skin is warm and dry.   Neurological:      Mental Status: She is alert and oriented to person, place, and time.           Results for orders placed or performed in visit on 02/26/25   POCT CEPHEID COV-2, FLU A/B, RSV - PCR    Collection Time: 02/26/25 12:51 PM   Result Value Ref Range    SARS-CoV-2 by PCR Negative Negative, Invalid    Influenza virus A RNA Negative Negative, Invalid    Influenza virus B, PCR Negative Negative, Invalid    RSV, PCR Negative Negative, Invalid             RADIOLOGY RESULTS   DX-CHEST-2 VIEWS  Result Date: 2/26/2025 2/26/2025 12:20 PM HISTORY/REASON FOR EXAM:  Cough Fever, abnormal breath sounds. TECHNIQUE/EXAM DESCRIPTION AND NUMBER OF VIEWS: Two views of the chest. COMPARISON:  1/1/2022 FINDINGS: Cardiomediastinal contour is within normal limits. Ill-defined opacities in the RIGHT mid and lower lung system with multifocal pneumonia. No pleural fluid collection or pneumothorax. Degenerative change of thoracic spine.     Multifocal pneumonia involving RIGHT lung.           Assessment/Plan:     Diagnosis and Associated Orders:     1. Acute cough  - POCT CEPHEID COV-2, FLU A/B, RSV - PCR  - DX-CHEST-2 VIEWS  - albuterol 108 (90 Base) MCG/ACT Aero Soln inhalation aerosol; Inhale 1-2 Puffs every four hours as needed for Shortness of Breath.  Dispense: 1 Each; Refill: 0  - benzonatate (TESSALON) 100 MG Cap; Take 1 Capsule by mouth 3 times a day as needed for Cough.  Dispense: 20 Capsule; Refill: 0    2. Rales  - POCT CEPHEID COV-2, FLU A/B, RSV - PCR  - DX-CHEST-2 VIEWS  - albuterol 108 (90 Base) MCG/ACT Aero Soln inhalation aerosol; Inhale 1-2 Puffs every four hours as needed for Shortness of Breath.  Dispense: 1 Each; Refill: 0  - benzonatate (TESSALON) 100 MG Cap; Take 1 Capsule by mouth 3 times a day as needed for Cough.  Dispense: 20 Capsule; Refill: 0    3.  Pneumonia of right lower lobe due to infectious organism  - doxycycline (VIBRAMYCIN) 100 MG Tab; Take 1 Tablet by mouth 2 times a day for 7 days.  Dispense: 14 Tablet; Refill: 0  - albuterol 108 (90 Base) MCG/ACT Aero Soln inhalation aerosol; Inhale 1-2 Puffs every four hours as needed for Shortness of Breath.  Dispense: 1 Each; Refill: 0  - benzonatate (TESSALON) 100 MG Cap; Take 1 Capsule by mouth 3 times a day as needed for Cough.  Dispense: 20 Capsule; Refill: 0    4. Antibiotic-induced yeast infection  - fluconazole (DIFLUCAN) 150 MG tablet; Take one now and repeat in 72 hours if symptoms persist  Dispense: 2 Tablet; Refill: 0        Medical Decision Making:    Sha 59 y.o. presents to clinic with:    Assessment & Plan  This a sha 59-year-old female who presents with 4-day history of fever, cough, productive, sore throat and myalgias.  Febrile today in the clinic.  O2 sats initially measured at 92%.  Rales heard to mid and lower lung base on right.  Chest x-ray consistent with multifocal pneumonia.  Initiating doxycycline.  Viral panel negative. A chest x-ray revealed ill-defined opacities in the right mid and lower lung system consistent with multifocal pneumonia. She has a history of bronchitis but no chronic lung disease or diabetes. . A prescription for Diflucan will also be provided. She is advised to monitor her oxygen saturation at home and seek immediate medical attention if it falls below 90%. She is also advised to take Mucinex to help break up thick mucus and to use saline spray or Flonase for postnasal drainage, cough, and congestion. A work note will be provided for her absence from 02/26/2025 to 02/28/2025. If she experiences severe coughing fits, steroids may be considered. If she experiences shortness of breath, lightheadedness, or dizziness, she should go to the ER.    Follow-up  The patient will follow up in 7 to 10 days to ensure resolution of symptoms.    I personally reviewed prior  external notes and test results pertinent to today's visit. Patient is clinically stable at today's urgent care visit.  Patient appears nontoxic with no acute distress noted. Appropriate for outpatient care at this time.  Return to clinic or go to ED if symptoms worsen or persist.  Red flag symptoms discussed.  Patient/Parent/Guardian voices understanding.   All side effects of medication discussed including allergic response, GI upset, tendon injury, rash, sedation etc    Please note that this dictation was created using voice recognition software. I have made a reasonable attempt to correct obvious errors, but I expect that there are errors of grammar and possibly content that I did not discover before finalizing the note.    This note was electronically signed by Angie Vincent PA-C

## 2025-02-26 NOTE — PROGRESS NOTES
"Subjective:      Toya Shields is a 53 y.o. female who presents with Cough (sore throat,ear pain,headache,fever, x 1 week)            HPI  Cough, productive, brown/green phlegm. Nasal congestion, clear nasal d/c, PND, sore throat. Tylenol ES. C/o \"chest burning\" with cough. Fatigue from cough. Unable to sleep at night with cough.      PMH:  has a past medical history of Anxiety; Arthritis; Depression; and Pain.  MEDS:   Current Outpatient Prescriptions:   •  sertraline (ZOLOFT) 50 MG Tab, Take 1 Tab by mouth every day., Disp: 30 Tab, Rfl: 11  •  vitamin E (VITAMIN E) 1000 UNIT Cap, Take 1 Cap by mouth every day., Disp: , Rfl:   •  valACYclovir (VALTREX) 1 GM Tab, Take 0.5 Tabs by mouth 2 times a day as needed., Disp: 60 Tab, Rfl: 3  ALLERGIES:   Allergies   Allergen Reactions   • Codeine Vomiting     SURGHX:   Past Surgical History:   Procedure Laterality Date   • KNEE ARTHROSCOPY Right 9/28/2018    Procedure: KNEE ARTHROSCOPY;  Surgeon: Aubrey Noonan M.D.;  Location: [facility];  Service: Orthopedics   • MEDIAL MENISCECTOMY Right 9/28/2018    Procedure: MEDIAL MENISCECTOMY - PARTIAL;  Surgeon: Aubrey Noonan M.D.;  Location: [facility];  Service: Orthopedics   • CHONDROPLASTY Right 9/28/2018    Procedure: CHONDROPLASTY - AND GOMES;  Surgeon: Aubrey Noonan M.D.;  Location: [facility];  Service: Orthopedics   • DENISE BY LAPAROSCOPY  2008   • HAND SURGERY Right 2000    right thumb ligament reattachement   • HYSTERECTOMY, VAGINAL  2000   • TONSILLECTOMY  1966     SOCHX:  reports that she has never smoked. She has never used smokeless tobacco. She reports that she drinks alcohol. She reports that she does not use drugs.  FH: Family history was reviewed, no pertinent findings to report    Review of Systems   Constitutional: Positive for malaise/fatigue. Negative for chills and fever.   HENT: Positive for congestion and sore throat. Negative for ear pain and sinus pain.  " Please see the attached refill request.     Respiratory: Positive for cough and sputum production. Negative for shortness of breath and wheezing.    Cardiovascular: Negative for chest pain, palpitations and orthopnea.   Gastrointestinal: Negative for abdominal pain, constipation, diarrhea, nausea and vomiting.   Musculoskeletal: Negative for back pain and myalgias.   Skin: Negative for itching and rash.   Neurological: Positive for headaches. Negative for dizziness, tingling, sensory change and weakness.   Endo/Heme/Allergies: Negative for environmental allergies.   All other systems reviewed and are negative.         Objective:     /86 (BP Location: Left arm, Patient Position: Sitting, BP Cuff Size: Adult)   Pulse 68   Temp 36.4 °C (97.5 °F) (Temporal)   Resp 16   SpO2 96%      Physical Exam   Constitutional: She is oriented to person, place, and time. Vital signs are normal. She appears well-developed and well-nourished. She is active and cooperative.  Non-toxic appearance. She does not have a sickly appearance. She does not appear ill. No distress.   Appears fatigued.   HENT:   Head: Normocephalic.   Right Ear: External ear and ear canal normal. A middle ear effusion is present.   Left Ear: External ear and ear canal normal. A middle ear effusion is present.   Nose: Mucosal edema and rhinorrhea present. No sinus tenderness.   Mouth/Throat: Uvula is midline. Mucous membranes are dry. No uvula swelling. No posterior oropharyngeal edema or posterior oropharyngeal erythema.   Eyes: Pupils are equal, round, and reactive to light. Conjunctivae and EOM are normal. Right eye exhibits no discharge. Left eye exhibits no discharge.   Neck: Normal range of motion.   Cardiovascular: Normal rate and regular rhythm.    Pulmonary/Chest: Effort normal and breath sounds normal. No accessory muscle usage. No respiratory distress. She has no decreased breath sounds. She has no wheezes. She has no rhonchi. She has no rales.   Abdominal: Soft. Bowel sounds are  normal. She exhibits no distension. There is no tenderness. There is no rebound and no guarding.   Musculoskeletal: Normal range of motion.   Lymphadenopathy:     She has no cervical adenopathy.   Neurological: She is alert and oriented to person, place, and time.   Skin: Skin is warm and dry. She is not diaphoretic.   Vitals reviewed.    CXR:    FINDINGS:  The mediastinal and cardiac silhouette is unremarkable.  The pulmonary vascularity is within normal limits.  The lung parenchyma is clear.  There is no significant pleural effusion.  There is no visible pneumothorax.  There are no acute bony abnormalities.     [facility] Aware web site evaluation: I have obtained and reviewed patient utilization report from [facility] pharmacy database prior to writing prescription for controlled substance II, III or IV. Based on the report and my clinical assessment the prescription is medically necessary        Assessment/Plan:     1. Productive cough    - DX-CHEST-2 VIEWS; Future  - albuterol 108 (90 Base) MCG/ACT Aero Soln inhalation aerosol; Inhale 2 Puffs by mouth every 6 hours as needed.  Dispense: 8.5 g; Refill: 0  - Hydrocod Polst-CPM Polst ER (TUSSIONEX) 10-8 MG/5ML Suspension Extended Release; Take 5 mL by mouth every 12 hours as needed for Cough for up to 12 days. Causes drowsiness, do not drive or work while using.  Dispense: 120 mL; Refill: 0    2. Nasal congestion with rhinorrhea    3. Bronchitis, acute, with bronchospasm    - albuterol 108 (90 Base) MCG/ACT Aero Soln inhalation aerosol; Inhale 2 Puffs by mouth every 6 hours as needed.  Dispense: 8.5 g; Refill: 0  - Hydrocod Polst-CPM Polst ER (TUSSIONEX) 10-8 MG/5ML Suspension Extended Release; Take 5 mL by mouth every 12 hours as needed for Cough for up to 12 days. Causes drowsiness, do not drive or work while using.  Dispense: 120 mL; Refill: 0  - doxycycline (VIBRAMYCIN) 100 MG Tab; Take 1 Tab by mouth 2 times a day for 7 days.  Dispense: 14 Tab; Refill:  0    4. Antibiotic-induced yeast infection    - fluconazole (DIFLUCAN) 150 MG tablet; Take 1 Tab by mouth Once for 1 dose.  Dispense: 1 Tab; Refill: 0    Increase water intake  May use Ibuprofen/Tylenol prn for fever or body aches  Get rest  May use daily longer acting antihistamine prn  May use saline nasal spray/flonase prn to flush any nasal congestion   Use inhaler prn for SOB with cough  Monitor for fevers, productive cough, SOB, CP, chest tightness- need re-evaluation

## 2025-02-26 NOTE — LETTER
February 26, 2025    To Whom It May Concern:         This is confirmation that Toya Shields attended her scheduled appointment with Angie Vincent P.A.-C. on 2/26/25.  Please excuse patient from work 2/26-2/28.         If you have any questions please do not hesitate to call me at the phone number listed below.    Sincerely,          Angie Vincent P.A.-C.  [phone]

## 2025-03-04 ENCOUNTER — HOSPITAL ENCOUNTER (OUTPATIENT)
Dept: RADIOLOGY | Facility: MEDICAL CENTER | Age: 60
End: 2025-03-04
Attending: PHYSICIAN ASSISTANT
Payer: COMMERCIAL

## 2025-03-04 ENCOUNTER — OFFICE VISIT (OUTPATIENT)
Dept: URGENT CARE | Facility: PHYSICIAN GROUP | Age: 60
End: 2025-03-04
Payer: COMMERCIAL

## 2025-03-04 ENCOUNTER — RESULTS FOLLOW-UP (OUTPATIENT)
Dept: URGENT CARE | Facility: PHYSICIAN GROUP | Age: 60
End: 2025-03-04

## 2025-03-04 VITALS
OXYGEN SATURATION: 92 % | HEIGHT: 60 IN | DIASTOLIC BLOOD PRESSURE: 72 MMHG | TEMPERATURE: 98.1 F | BODY MASS INDEX: 37.48 KG/M2 | WEIGHT: 190.92 LBS | HEART RATE: 73 BPM | RESPIRATION RATE: 17 BRPM | SYSTOLIC BLOOD PRESSURE: 122 MMHG

## 2025-03-04 DIAGNOSIS — R06.02 SOB (SHORTNESS OF BREATH): ICD-10-CM

## 2025-03-04 DIAGNOSIS — J18.9 PNEUMONIA OF RIGHT LOWER LOBE DUE TO INFECTIOUS ORGANISM: ICD-10-CM

## 2025-03-04 PROCEDURE — 3078F DIAST BP <80 MM HG: CPT | Performed by: PHYSICIAN ASSISTANT

## 2025-03-04 PROCEDURE — 71046 X-RAY EXAM CHEST 2 VIEWS: CPT

## 2025-03-04 PROCEDURE — 3074F SYST BP LT 130 MM HG: CPT | Performed by: PHYSICIAN ASSISTANT

## 2025-03-04 PROCEDURE — 99214 OFFICE O/P EST MOD 30 MIN: CPT | Performed by: PHYSICIAN ASSISTANT

## 2025-03-04 RX ORDER — METHYLPREDNISOLONE 4 MG/1
TABLET ORAL
Qty: 21 TABLET | Refills: 0 | Status: SHIPPED | OUTPATIENT
Start: 2025-03-04

## 2025-03-04 RX ORDER — AZITHROMYCIN 250 MG/1
TABLET, FILM COATED ORAL
Qty: 6 TABLET | Refills: 0 | Status: SHIPPED | OUTPATIENT
Start: 2025-03-04

## 2025-03-04 ASSESSMENT — ENCOUNTER SYMPTOMS
CHILLS: 1
COUGH: 1
SWEATS: 0
NAUSEA: 0
DIZZINESS: 0
SORE THROAT: 0
MYALGIAS: 1
SPUTUM PRODUCTION: 1
VOMITING: 0
SHORTNESS OF BREATH: 1
RHINORRHEA: 1
ABDOMINAL PAIN: 0
FEVER: 1
HEMOPTYSIS: 0
DIARRHEA: 0
WHEEZING: 1
CARDIOVASCULAR NEGATIVE: 1
HEADACHES: 1

## 2025-03-04 ASSESSMENT — FIBROSIS 4 INDEX: FIB4 SCORE: 1.67

## 2025-03-04 NOTE — LETTER
March 4, 2025         Patient: Toya Shields   YOB: 1965   Date of Visit: 3/4/2025           To Whom it May Concern:    Toya Shields was seen in my clinic on 3/4/2025. She is excused from work on 3/5, 3/6, and 3/7 due to acute illness.    If you have any questions or concerns, please don't hesitate to call.        Sincerely,           Blade Gonzalez P.A.-C.  Electronically Signed

## 2025-03-04 NOTE — PROGRESS NOTES
Subjective     Aida Shields is a very pleasant 59 y.o. female who presents with Cough (C/o sob, got Dx for pneumonia 02/26/2025, hasn't gotten better.)            Diagnosed with right-sided pneumonia 2/26/2025.  She finished her doxycycline today.  She did note she was improving, however yesterday her symptoms rebounded.  She is having shortness of breath, wheezing, productive cough.  Intermittent fever chills and bodyaches.  She feels very fatigued.    Cough  This is a new problem. The current episode started 1 to 4 weeks ago. The problem has been unchanged. The problem occurs every few minutes. The cough is Productive of sputum. Associated symptoms include chills, a fever, headaches, myalgias, nasal congestion, rhinorrhea, shortness of breath and wheezing. Pertinent negatives include no chest pain, ear pain, hemoptysis, sore throat or sweats. She has tried OTC cough suppressant for the symptoms. The treatment provided mild relief. There is no history of asthma.       PMH:  has a past medical history of Anxiety, Arthritis, Chickenpox, Complex tear of medial meniscus of right knee, initial encounter (09/28/2018), COVID-19 (01/2021), Daytime sleepiness, Depression, Dizziness, Genital herpes, High cholesterol (2019), Insomnia, Nasal drainage, Pain, Painful joint, Positive PPD, Prediabetes (09/2021), Restless leg syndrome, Snoring (11/02/2023), and Wears glasses.    She has no past medical history of Acute nasopharyngitis, Anesthesia, Anginal syndrome ([facility]), Arrhythmia, Asthma, Blood clotting disorder ([facility]), Bowel habit changes, Breath shortness, Bronchitis, Cancer ([facility]), Carcinoma in situ of respiratory system, Cataract, Congestive heart failure ([facility]), Continuous ambulatory peritoneal dialysis status ([facility]), Coughing blood, Dental disorder, Diabetes ([facility]), Dialysis patient ([facility]), Disorder of thyroid, Emphysema of lung ([facility]), Glaucoma, Gynecological disorder, Heart burn, Heart murmur, Heart valve disease,  Hemorrhagic disorder (HCC), Hepatitis A, Hepatitis B, Hepatitis C, Hiatus hernia syndrome, Hypertension, Indigestion, Jaundice, Myocardial infarct (HCC), Pacemaker, Pneumonia, Pregnant, Renal disorder, Rheumatic fever, Seizure (HCC), Sleep apnea, Stroke (HCC), Tuberculosis, Urinary bladder disorder, or Urinary incontinence.  MEDS:   Current Outpatient Medications:     azithromycin (ZITHROMAX) 250 MG Tab, Z-catherine, Use as directed., Disp: 6 Tablet, Rfl: 0    amoxicillin-clavulanate (AUGMENTIN) 875-125 MG Tab, Take 1 Tablet by mouth 2 times a day., Disp: 14 Tablet, Rfl: 0    methylPREDNISolone (MEDROL DOSEPAK) 4 MG Tablet Therapy Pack, Follow schedule on package instructions., Disp: 21 Tablet, Rfl: 0    sertraline (ZOLOFT) 100 MG Tab, TAKE 1 TABLET BY MOUTH EVERY DAY, Disp: 90 Tablet, Rfl: 0    albuterol 108 (90 Base) MCG/ACT Aero Soln inhalation aerosol, Inhale 1-2 Puffs every four hours as needed for Shortness of Breath., Disp: 1 Each, Rfl: 0    benzonatate (TESSALON) 100 MG Cap, Take 1 Capsule by mouth 3 times a day as needed for Cough., Disp: 20 Capsule, Rfl: 0    fluconazole (DIFLUCAN) 150 MG tablet, Take one now and repeat in 72 hours if symptoms persist, Disp: 2 Tablet, Rfl: 0    azelastine (ASTELIN) 137 MCG/SPRAY nasal spray, Administer 1 Spray into affected nostril(S) 2 times a day., Disp: 30 mL, Rfl: 0    rosuvastatin (CRESTOR) 10 MG Tab, Take 1 Tablet by mouth every evening., Disp: 90 Tablet, Rfl: 3    traZODone (DESYREL) 50 MG Tab, Take 1 Tablet by mouth at bedtime as needed for Sleep. Take 30 minutes before bedtime., Disp: 90 Tablet, Rfl: 3    cholecalciferol (D3 5000) 5000 UNIT Cap, Take 5,000 Units by mouth every day., Disp: , Rfl:     Multiple Vitamins-Minerals (MULTIVITAMIN GUMMIES WOMENS PO), Take  by mouth every day., Disp: , Rfl:   ALLERGIES:   Allergies   Allergen Reactions    Codeine Vomiting     SURGHX:   Past Surgical History:   Procedure Laterality Date    PB KNEE SCOPE,MED OR LAT MENIS REPAIR  Left 02/22/2023    Procedure: Knee arthroscopy with  versus partial medial meniscectomy, chondroplasty;  Surgeon: Aubrey Noonan M.D.;  Location: [facility];  Service: Orthopedics    GANGLION EXCISION Left 02/22/2023    Procedure: EXCISION, GANGLION CYST;  Surgeon: Aubrey Noonan M.D.;  Location: [facility];  Service: Orthopedics    KNEE ARTHROSCOPY Right 09/28/2018    Procedure: KNEE ARTHROSCOPY;  Surgeon: Aubrey Noonan M.D.;  Location: [facility];  Service: Orthopedics    MENISCECTOMY, KNEE, MEDIAL Right 09/28/2018    Procedure: MEDIAL MENISCECTOMY - PARTIAL;  Surgeon: Aubrey Noonan M.D.;  Location: [facility];  Service: Orthopedics    CHONDROPLASTY Right 09/28/2018    Procedure: CHONDROPLASTY - AND GOMES;  Surgeon: Aubrey Noonan M.D.;  Location: [facility];  Service: Orthopedics    DENISE BY LAPAROSCOPY  2008    HAND SURGERY Right 2000    right thumb ligament reattachement    HYSTERECTOMY, VAGINAL  2000    TONSILLECTOMY  1966    ARTHROSCOPY, KNEE      CHOLECYSTECTOMY      GYN SURGERY  2003    Partial hysterectomy    HYSTERECTOMY LAPAROSCOPY      OTHER  1999    Right thumb pinned after game keeper fracture    OTHER ABDOMINAL SURGERY  2001    Cholecystectomy    PRIMARY C SECTION       SOCHX:  reports that she has never smoked. She has never used smokeless tobacco. She reports that she does not currently use alcohol. She reports that she does not use drugs.  FH: family history includes Alcohol abuse in her maternal grandfather and maternal grandmother; Alzheimer's Disease in her maternal aunt; Cancer in her father and maternal grandmother; Clotting Disorder in her sister; Diabetes in her maternal grandfather and maternal grandmother; Heart Disease in her sister; Hypertension in her father and mother; Kidney stones in her father and sister; Other in her maternal grandfather and sister; Thyroid in her sister.      Review of Systems   Constitutional:   Positive for chills, fever and malaise/fatigue.   HENT:  Positive for congestion and rhinorrhea. Negative for ear pain and sore throat.    Respiratory:  Positive for cough, sputum production, shortness of breath and wheezing. Negative for hemoptysis.    Cardiovascular: Negative.  Negative for chest pain.   Gastrointestinal:  Negative for abdominal pain, diarrhea, nausea and vomiting.   Musculoskeletal:  Positive for myalgias.   Neurological:  Positive for headaches. Negative for dizziness.       Medications, Allergies, and current problem list reviewed today in Epic           Objective     /72 (BP Location: Left arm, Patient Position: Sitting, BP Cuff Size: Adult long)   Pulse 73   Temp 36.7 °C (98.1 °F) (Temporal)   Resp 17   Ht 1.524 m (5')   Wt 86.6 kg (190 lb 14.7 oz)   SpO2 92%   BMI 37.29 kg/m²      Physical Exam  Vitals and nursing note reviewed.   Constitutional:       General: She is not in acute distress.     Appearance: Normal appearance. She is well-developed. She is not ill-appearing, toxic-appearing or diaphoretic.   HENT:      Head: Normocephalic and atraumatic.      Right Ear: Tympanic membrane, ear canal and external ear normal.      Left Ear: Tympanic membrane, ear canal and external ear normal.      Nose: Nose normal. No congestion or rhinorrhea.      Mouth/Throat:      Mouth: Mucous membranes are moist.      Pharynx: No oropharyngeal exudate or posterior oropharyngeal erythema.   Eyes:      General:         Right eye: No discharge.         Left eye: No discharge.      Conjunctiva/sclera: Conjunctivae normal.   Neck:      Vascular: No JVD.   Cardiovascular:      Rate and Rhythm: Normal rate and regular rhythm.      Pulses: Normal pulses.      Heart sounds: Normal heart sounds.   Pulmonary:      Effort: Pulmonary effort is normal. No respiratory distress.      Breath sounds: Examination of the right-middle field reveals rales. Examination of the right-lower field reveals rales.  Decreased breath sounds, wheezing and rales present. No rhonchi.   Musculoskeletal:      Cervical back: Normal range of motion and neck supple.      Right lower leg: No edema.      Left lower leg: No edema.   Lymphadenopathy:      Cervical: No cervical adenopathy.   Skin:     General: Skin is warm and dry.   Neurological:      General: No focal deficit present.      Mental Status: She is alert and oriented to person, place, and time. Mental status is at baseline.   Psychiatric:         Mood and Affect: Mood normal.         Behavior: Behavior normal.         Thought Content: Thought content normal.         Judgment: Judgment normal.               3/4/2025 2:14 PM     HISTORY/REASON FOR EXAM:  Shortness of Breath        TECHNIQUE/EXAM DESCRIPTION AND NUMBER OF VIEWS:  Two views of the chest.     COMPARISON:  2/26/2025.     FINDINGS:     Hazy opacity throughout the right lung  No pleural effusions, no pneumothorax are appreciated.  Normal cardiopericardial silhouette.     IMPRESSION:        Hazy opacity throughout the right lung, slightly less than prior.              Assessment & Plan  SOB (shortness of breath)    Orders:    DX-CHEST-2 VIEWS; Future    Pneumonia of right lower lobe due to infectious organism  Very pleasant 59-year-old female presenting with ongoing cough congestion shortness of breath and wheezing.  Diagnosed with pneumonia on 2/26/2025, completed full course of doxycycline.  Significant initial improvement however yesterday symptoms have rebounded.  Today she has decreased oxygen otherwise vital signs stable.  She does have painful productive cough with right lower Rales and decreased breath sounds.  Fortunately, x-ray does show interval improvement.  However based on current presentation and recurrence of symptoms she will    again be treated for pneumonia.  ER and red flag symptoms discussed at length with patient.      Orders:    azithromycin (ZITHROMAX) 250 MG Tab; Z-catherine, Use as directed.     amoxicillin-clavulanate (AUGMENTIN) 875-125 MG Tab; Take 1 Tablet by mouth 2 times a day.    methylPREDNISolone (MEDROL DOSEPAK) 4 MG Tablet Therapy Pack; Follow schedule on package instructions.           I personally reviewed prior external notes and test results pertinent to today's visit. Return to clinic or go to ED if symptoms worsen or persist. Red flag symptoms and indications for ED discussed at length. Patient/Parent/Guardian voices understanding.  AVS with post-visit instructions printed and provided or given verbally.  Follow-up with your primary care provider in 3-5 days. All side effects and potential interactions of prescribed medication discussed including allergic response, GI upset, tendon injury, rash, sedation, OCP effectiveness, etc.  Please note that this dictation was created using voice recognition software. I have made every reasonable attempt to correct obvious errors, but I expect that there are errors of grammar and possibly content that I did not discover before finalizing the note.

## 2025-04-15 ENCOUNTER — RESULTS FOLLOW-UP (OUTPATIENT)
Dept: URGENT CARE | Facility: PHYSICIAN GROUP | Age: 60
End: 2025-04-15

## 2025-04-15 ENCOUNTER — OFFICE VISIT (OUTPATIENT)
Dept: URGENT CARE | Facility: PHYSICIAN GROUP | Age: 60
End: 2025-04-15
Payer: COMMERCIAL

## 2025-04-15 ENCOUNTER — HOSPITAL ENCOUNTER (OUTPATIENT)
Dept: RADIOLOGY | Facility: MEDICAL CENTER | Age: 60
End: 2025-04-15
Attending: PHYSICIAN ASSISTANT
Payer: COMMERCIAL

## 2025-04-15 VITALS
WEIGHT: 181.11 LBS | RESPIRATION RATE: 15 BRPM | HEART RATE: 96 BPM | BODY MASS INDEX: 35.56 KG/M2 | SYSTOLIC BLOOD PRESSURE: 102 MMHG | DIASTOLIC BLOOD PRESSURE: 60 MMHG | HEIGHT: 60 IN | OXYGEN SATURATION: 94 % | TEMPERATURE: 97.1 F

## 2025-04-15 DIAGNOSIS — R05.1 ACUTE COUGH: ICD-10-CM

## 2025-04-15 DIAGNOSIS — J18.9 PNEUMONIA OF RIGHT MIDDLE LOBE DUE TO INFECTIOUS ORGANISM: ICD-10-CM

## 2025-04-15 DIAGNOSIS — B33.8 INFECTION DUE TO RESPIRATORY SYNCYTIAL VIRUS (RSV), UNSPECIFIED INFECTION TYPE: ICD-10-CM

## 2025-04-15 LAB
FLUAV RNA SPEC QL NAA+PROBE: NEGATIVE
FLUBV RNA SPEC QL NAA+PROBE: NEGATIVE
RSV RNA SPEC QL NAA+PROBE: POSITIVE
SARS-COV-2 RNA RESP QL NAA+PROBE: NEGATIVE

## 2025-04-15 PROCEDURE — 0241U POCT CEPHEID COV-2, FLU A/B, RSV - PCR: CPT | Performed by: PHYSICIAN ASSISTANT

## 2025-04-15 PROCEDURE — 99214 OFFICE O/P EST MOD 30 MIN: CPT | Performed by: PHYSICIAN ASSISTANT

## 2025-04-15 PROCEDURE — 71046 X-RAY EXAM CHEST 2 VIEWS: CPT

## 2025-04-15 PROCEDURE — 3074F SYST BP LT 130 MM HG: CPT | Performed by: PHYSICIAN ASSISTANT

## 2025-04-15 PROCEDURE — 3078F DIAST BP <80 MM HG: CPT | Performed by: PHYSICIAN ASSISTANT

## 2025-04-15 RX ORDER — METHYLPREDNISOLONE 4 MG/1
TABLET ORAL
Qty: 21 TABLET | Refills: 0 | Status: SHIPPED | OUTPATIENT
Start: 2025-04-15

## 2025-04-15 RX ORDER — AZITHROMYCIN 250 MG/1
TABLET, FILM COATED ORAL
Qty: 6 TABLET | Refills: 0 | Status: SHIPPED | OUTPATIENT
Start: 2025-04-15

## 2025-04-15 ASSESSMENT — ENCOUNTER SYMPTOMS
PALPITATIONS: 0
CHILLS: 0
SPUTUM PRODUCTION: 1
HEMOPTYSIS: 0
SHORTNESS OF BREATH: 1
FEVER: 1
COUGH: 1
WHEEZING: 1

## 2025-04-15 ASSESSMENT — FIBROSIS 4 INDEX: FIB4 SCORE: 1.67

## 2025-04-15 NOTE — PROGRESS NOTES
Subjective:   Toya Shields is a 59 y.o. female who presents today with   Chief Complaint   Patient presents with    Cough     X 1 week. Got worse 2 days ago and having sob. On and off fever last night.     Cough  This is a new problem. The current episode started in the past 7 days. Associated symptoms include a fever, shortness of breath and wheezing. Pertinent negatives include no chest pain, chills or hemoptysis.     First visit was 2/26 and patient found to have pneumonia at that time and treated with doxycycline.  Patient was seen in March for pneumonia at that time and treated with Augmentin, azithromycin and Medrol Dosepak.  Patient states she was feeling better after that regimen but over the last 4 days started having return of cough and symptoms and some bodyaches and fever up to 101.    PMH:  has a past medical history of Anxiety, Arthritis, Chickenpox, Complex tear of medial meniscus of right knee, initial encounter (09/28/2018), COVID-19 (01/2021), Daytime sleepiness, Depression, Dizziness, Genital herpes, High cholesterol (2019), Insomnia, Nasal drainage, Pain, Painful joint, Positive PPD, Prediabetes (09/2021), Restless leg syndrome, Snoring (11/02/2023), and Wears glasses.    She has no past medical history of Acute nasopharyngitis, Anesthesia, Anginal syndrome (HCC), Arrhythmia, Asthma, Blood clotting disorder (HCC), Bowel habit changes, Breath shortness, Bronchitis, Cancer (HCC), Carcinoma in situ of respiratory system, Cataract, Congestive heart failure (HCC), Continuous ambulatory peritoneal dialysis status (HCC), Coughing blood, Dental disorder, Diabetes (HCC), Dialysis patient (HCC), Disorder of thyroid, Emphysema of lung (HCC), Glaucoma, Gynecological disorder, Heart burn, Heart murmur, Heart valve disease, Hemorrhagic disorder (HCC), Hepatitis A, Hepatitis B, Hepatitis C, Hiatus hernia syndrome, Hypertension, Indigestion, Jaundice, Myocardial infarct (HCC), Pacemaker, Pneumonia,  Pregnant, Renal disorder, Rheumatic fever, Seizure (HCC), Sleep apnea, Stroke (HCC), Tuberculosis, Urinary bladder disorder, or Urinary incontinence.  MEDS:   Current Outpatient Medications:     azithromycin (ZITHROMAX) 250 MG Tab, Take 2 tablets by mouth on day one. Take one tablet by mouth the remaining days until gone, Disp: 6 Tablet, Rfl: 0    amoxicillin-clavulanate (AUGMENTIN) 875-125 MG Tab, Take 1 Tablet by mouth 2 times a day for 7 days., Disp: 14 Tablet, Rfl: 0    methylPREDNISolone (MEDROL DOSEPAK) 4 MG Tablet Therapy Pack, Follow schedule on package instructions., Disp: 21 Tablet, Rfl: 0    sertraline (ZOLOFT) 100 MG Tab, TAKE 1 TABLET BY MOUTH EVERY DAY, Disp: 90 Tablet, Rfl: 0    albuterol 108 (90 Base) MCG/ACT Aero Soln inhalation aerosol, Inhale 1-2 Puffs every four hours as needed for Shortness of Breath., Disp: 1 Each, Rfl: 0    benzonatate (TESSALON) 100 MG Cap, Take 1 Capsule by mouth 3 times a day as needed for Cough., Disp: 20 Capsule, Rfl: 0    fluconazole (DIFLUCAN) 150 MG tablet, Take one now and repeat in 72 hours if symptoms persist, Disp: 2 Tablet, Rfl: 0    azelastine (ASTELIN) 137 MCG/SPRAY nasal spray, Administer 1 Spray into affected nostril(S) 2 times a day., Disp: 30 mL, Rfl: 0    rosuvastatin (CRESTOR) 10 MG Tab, Take 1 Tablet by mouth every evening., Disp: 90 Tablet, Rfl: 3    traZODone (DESYREL) 50 MG Tab, Take 1 Tablet by mouth at bedtime as needed for Sleep. Take 30 minutes before bedtime., Disp: 90 Tablet, Rfl: 3    cholecalciferol (D3 5000) 5000 UNIT Cap, Take 5,000 Units by mouth every day., Disp: , Rfl:     Multiple Vitamins-Minerals (MULTIVITAMIN GUMMIES WOMENS PO), Take  by mouth every day., Disp: , Rfl:   ALLERGIES:   Allergies   Allergen Reactions    Codeine Vomiting     SURGHX:   Past Surgical History:   Procedure Laterality Date    PB KNEE SCOPE,MED OR LAT MENIS REPAIR Left 02/22/2023    Procedure: Knee arthroscopy with  versus partial medial meniscectomy,  chondroplasty;  Surgeon: Aubrey Noonan M.D.;  Location: [facility];  Service: Orthopedics    GANGLION EXCISION Left 02/22/2023    Procedure: EXCISION, GANGLION CYST;  Surgeon: Aubrey Noonan M.D.;  Location: [facility];  Service: Orthopedics    KNEE ARTHROSCOPY Right 09/28/2018    Procedure: KNEE ARTHROSCOPY;  Surgeon: Aubrey Noonan M.D.;  Location: [facility];  Service: Orthopedics    MENISCECTOMY, KNEE, MEDIAL Right 09/28/2018    Procedure: MEDIAL MENISCECTOMY - PARTIAL;  Surgeon: Aubrey Noonan M.D.;  Location: [facility];  Service: Orthopedics    CHONDROPLASTY Right 09/28/2018    Procedure: CHONDROPLASTY - AND GOMES;  Surgeon: Aubrey Noonan M.D.;  Location: [facility];  Service: Orthopedics    DENISE BY LAPAROSCOPY  2008    HAND SURGERY Right 2000    right thumb ligament reattachement    HYSTERECTOMY, VAGINAL  2000    TONSILLECTOMY  1966    ARTHROSCOPY, KNEE      CHOLECYSTECTOMY      GYN SURGERY  2003    Partial hysterectomy    HYSTERECTOMY LAPAROSCOPY      OTHER  1999    Right thumb pinned after game keeper fracture    OTHER ABDOMINAL SURGERY  2001    Cholecystectomy    PRIMARY C SECTION       SOCHX:  reports that she has never smoked. She has never used smokeless tobacco. She reports that she does not currently use alcohol. She reports that she does not use drugs.  FH: Reviewed with patient, not pertinent to this visit.     Review of Systems   Constitutional:  Positive for fever. Negative for chills.   Respiratory:  Positive for cough, sputum production, shortness of breath and wheezing. Negative for hemoptysis.    Cardiovascular:  Negative for chest pain and palpitations.      Objective:   /60 (BP Location: Left arm, Patient Position: Sitting, BP Cuff Size: Adult long)   Pulse 96   Temp 36.2 °C (97.1 °F) (Temporal)   Resp 15   Ht 1.524 m (5')   Wt 82.2 kg (181 lb 1.7 oz)   SpO2 94%   BMI 35.37 kg/m²   Physical Exam  Vitals and  nursing note reviewed.   Constitutional:       General: She is not in acute distress.     Appearance: Normal appearance. She is well-developed. She is not ill-appearing or toxic-appearing.   HENT:      Head: Normocephalic and atraumatic.      Right Ear: Hearing normal.      Left Ear: Hearing normal.   Cardiovascular:      Rate and Rhythm: Normal rate and regular rhythm.      Heart sounds: Normal heart sounds.   Pulmonary:      Effort: Pulmonary effort is normal. No respiratory distress.      Breath sounds: No stridor. Wheezing (mild) and rales present. No rhonchi.   Musculoskeletal:      Comments: Normal movement in all 4 extremities   Skin:     General: Skin is warm and dry.   Neurological:      Mental Status: She is alert.      Coordination: Coordination normal.   Psychiatric:         Mood and Affect: Mood normal.       DX CHEST  FINDINGS:     Airspace opacity in the right midlung  No pleural effusions, no pneumothorax are appreciated.  Normal cardiopericardial silhouette.        IMPRESSION:     Airspace opacity in the right midlung, concerning for pneumonia.    COVID -  FLU -  RSV +    Assessment/Plan:   Assessment    1. Pneumonia of right middle lobe due to infectious organism  - Referral to Pulmonary and Sleep Medicine  - azithromycin (ZITHROMAX) 250 MG Tab; Take 2 tablets by mouth on day one. Take one tablet by mouth the remaining days until gone  Dispense: 6 Tablet; Refill: 0  - amoxicillin-clavulanate (AUGMENTIN) 875-125 MG Tab; Take 1 Tablet by mouth 2 times a day for 7 days.  Dispense: 14 Tablet; Refill: 0  - methylPREDNISolone (MEDROL DOSEPAK) 4 MG Tablet Therapy Pack; Follow schedule on package instructions.  Dispense: 21 Tablet; Refill: 0    2. Infection due to respiratory syncytial virus (RSV), unspecified infection type    3. Acute cough  - POCT CoV-2, Flu A/B, RSV by PCR  - DX-CHEST-2 VIEWS; Future    Continue with previously prescribed albuterol inhaler.  Discussed with patient treatment options and  she states that the last medication she was on seem to really help and help her feel better and she would like to start on that again today.  Discussed that I would recommend following up with pulmonology if symptoms continue to persist for potential need of further workup and imaging.  Discussed with patient that given that x-ray showed signs of pneumonia I would still recommend treatment with antibiotics and she is agreeable with this plan.  RSV was positive today which patient is understanding is a viral illness.  Pulmonology referral placed in case she needs follow-up with them.  Patient understand possible side effects of medication and to take probiotics while on the medication.    Differential diagnosis, natural history, supportive care, and indications for immediate follow-up discussed.   Patient given instructions and understanding of medications and treatment.    If not improving in 3-5 days, F/U with PCP or return to UC if symptoms worsen.    Patient agreeable to plan.      Please note that this dictation was created using voice recognition software. I have made every reasonable attempt to correct obvious errors, but I expect that there are errors of grammar and possibly content that I did not discover before finalizing the note.    Darrin Rowell PA-C

## 2025-04-15 NOTE — LETTER
April 15, 2025         Patient: Toya Shields   YOB: 1965   Date of Visit: 4/15/2025           To Whom it May Concern:    Toya Shields was seen in my clinic on 4/15/2025.  Please excuse her absence 4/16, 4/17 and 4/18.    If you have any questions or concerns, please don't hesitate to call.        Sincerely,           Darrin Rowell P.A.-C.  Electronically Signed

## 2025-04-21 NOTE — Clinical Note
REFERRAL APPROVAL NOTICE         Sent on April 21, 2025                   Aida Shields  [address] [facility] [address]                   Dear Ms. Shields,    After a careful review of the medical information and benefit coverage, [facility] has processed your referral. See below for additional details.    If applicable, you must be actively enrolled with your insurance for coverage of the authorized service. If you have any questions regarding your coverage, please contact your insurance directly.    REFERRAL INFORMATION   Referral #:  [id number]  Referred-To Department    Referred-By Provider:  Pulmonary and Sleep Medicine    Darrin Rowell P.A.-C.   Pulmonary/sleep [facility]      [address]  [address]  [place] [address] [phone] [address], [address]  [place] [address] [phone]    Referral Start Date:  04/15/2025  Referral End Date:   04/15/2026           SCHEDULING  If you do not already have an appointment, please call [phone] to make an appointment.   MORE INFORMATION  As a reminder, [facility] ownership has changed, meaning this location is now owned and operated by [facility]. As such, we want to clarify that our patients should expect to receive two separate bills for the services received at [facility] - one representing the [facility] facility fees as the owner of the establishment, and the other to represent the physician's services and subsequent fees. You can speak with your insurance carrier for a pricing estimate by calling the customer service number on the back of your card and ask about charges for a hospital outpatient visit.  If you do not already have a [facility] [employer] account, sign up at: [email or web address]  You can access your medical information, make appointments, see lab results, billing  information, and more.  If you have questions regarding this referral, please contact  the [facility] department at:             [phone]. Monday - Friday 7:30AM - 5:00PM.      Sincerely,  [facility]

## 2025-05-23 DIAGNOSIS — F32.A ANXIETY AND DEPRESSION: ICD-10-CM

## 2025-05-23 DIAGNOSIS — F41.9 ANXIETY AND DEPRESSION: ICD-10-CM

## 2025-05-27 RX ORDER — SERTRALINE HYDROCHLORIDE 100 MG/1
100 TABLET, FILM COATED ORAL DAILY
Qty: 90 TABLET | Refills: 0 | Status: SHIPPED | OUTPATIENT
Start: 2025-05-27

## 2025-05-27 NOTE — TELEPHONE ENCOUNTER
Received request via: Pharmacy    Was the patient seen in the last year in this department? No, Scheduled 06/17/2025     Does the patient have an active prescription (recently filled or refills available) for medication(s) requested? No    Pharm[facility] Name[facility] [place]    Does the patient have alf Plus and need 100-day supply? (This applies to ALL medications) Patient does not have SCP

## 2025-05-28 DIAGNOSIS — G47.09 OTHER INSOMNIA: ICD-10-CM

## 2025-05-28 RX ORDER — TRAZODONE HYDROCHLORIDE 50 MG/1
50 TABLET ORAL NIGHTLY PRN
Qty: 90 TABLET | Refills: 3 | Status: SHIPPED | OUTPATIENT
Start: 2025-05-28

## 2025-05-29 NOTE — TELEPHONE ENCOUNTER
Requested Prescriptions     Signed Prescriptions Disp Refills    traZODone (DESYREL) 50 MG Tab 90 Tablet 3     Sig: TAKE 1 TABLET BY MOUTH AT BEDTIME AS NEEDED FOR SLEEP. TAKE 30 MINUTES BEFORE BEDTIME.     Authorizing Provider: MARIELLA MARK A.P.R.N.

## 2025-05-29 NOTE — TELEPHONE ENCOUNTER
Received request via: Pharmacy    Was the patient seen in the last year in this department? No has upcoming appt 6/17/25    Does the patient have an active prescription (recently filled or refills available) for medication(s) requested? No    [facility]    Does the patient have FPC Plus and need 100-day supply? (This applies to ALL medications) Patient does not have SCP

## 2025-06-17 ENCOUNTER — APPOINTMENT (OUTPATIENT)
Dept: MEDICAL GROUP | Facility: PHYSICIAN GROUP | Age: 60
End: 2025-06-17
Payer: COMMERCIAL

## 2025-07-03 DIAGNOSIS — E78.5 DYSLIPIDEMIA: ICD-10-CM

## 2025-07-03 NOTE — TELEPHONE ENCOUNTER
Received request via: Pharmacy    Was the patient seen in the last year in this department? No has appt 7/7/25    Does the patient have an active prescription (recently filled or refills available) for medication(s) requested? No    [facility]    Does the patient have FPC Plus and need 100-day supply? (This applies to ALL medications) Patient does not have SCP

## 2025-07-07 ENCOUNTER — APPOINTMENT (OUTPATIENT)
Dept: MEDICAL GROUP | Facility: PHYSICIAN GROUP | Age: 60
End: 2025-07-07
Payer: COMMERCIAL

## 2025-07-07 VITALS
WEIGHT: 186 LBS | TEMPERATURE: 97.9 F | BODY MASS INDEX: 36.52 KG/M2 | HEART RATE: 73 BPM | HEIGHT: 60 IN | OXYGEN SATURATION: 97 % | SYSTOLIC BLOOD PRESSURE: 116 MMHG | DIASTOLIC BLOOD PRESSURE: 74 MMHG

## 2025-07-07 DIAGNOSIS — M54.50 ACUTE RIGHT-SIDED LOW BACK PAIN WITHOUT SCIATICA: ICD-10-CM

## 2025-07-07 DIAGNOSIS — M72.2 PLANTAR FASCIITIS, BILATERAL: Primary | ICD-10-CM

## 2025-07-07 DIAGNOSIS — Z13.0 SCREENING FOR ENDOCRINE, METABOLIC AND IMMUNITY DISORDER: ICD-10-CM

## 2025-07-07 DIAGNOSIS — Z13.228 SCREENING FOR ENDOCRINE, METABOLIC AND IMMUNITY DISORDER: ICD-10-CM

## 2025-07-07 DIAGNOSIS — Z00.00 PREVENTATIVE HEALTH CARE: ICD-10-CM

## 2025-07-07 DIAGNOSIS — Z13.29 SCREENING FOR ENDOCRINE, METABOLIC AND IMMUNITY DISORDER: ICD-10-CM

## 2025-07-07 DIAGNOSIS — Z23 NEED FOR VACCINATION: ICD-10-CM

## 2025-07-07 PROCEDURE — 90471 IMMUNIZATION ADMIN: CPT | Performed by: NURSE PRACTITIONER

## 2025-07-07 PROCEDURE — 3078F DIAST BP <80 MM HG: CPT | Performed by: NURSE PRACTITIONER

## 2025-07-07 PROCEDURE — 90677 PCV20 VACCINE IM: CPT | Performed by: NURSE PRACTITIONER

## 2025-07-07 PROCEDURE — 3074F SYST BP LT 130 MM HG: CPT | Performed by: NURSE PRACTITIONER

## 2025-07-07 PROCEDURE — 99214 OFFICE O/P EST MOD 30 MIN: CPT | Mod: 25 | Performed by: NURSE PRACTITIONER

## 2025-07-07 RX ORDER — ROSUVASTATIN CALCIUM 10 MG/1
10 TABLET, COATED ORAL EVERY EVENING
Qty: 90 TABLET | Refills: 0 | Status: SHIPPED | OUTPATIENT
Start: 2025-07-07

## 2025-07-07 ASSESSMENT — FIBROSIS 4 INDEX: FIB4 SCORE: 1.67

## 2025-07-07 ASSESSMENT — PATIENT HEALTH QUESTIONNAIRE - PHQ9: CLINICAL INTERPRETATION OF PHQ2 SCORE: 0

## 2025-07-07 NOTE — PROGRESS NOTES
Subjective:     CC: foot pain and low back pain    HPI:   Toya presents today with the following:    Plantar fasciitis, bilateral  The bilateral foot pain started 2 months ago.  History of plantar fasciitis. The pain is intermittent. Pain worsens with sitting and then standing. Walking no pain. The pain is described as sharp. Starts in heel and radiates to ball of foot. The pain on the right foot is worse than the left foot. Interventions tried include hot tub, massage, Advil/Tylenol, Voltaren, CBD topical. She does stretches in shower and icing. When she had pneumonia she was given Medrol dose catherine that helped symptoms. She has seen Podiatry years ago and received injection that helped.     Acute right-sided low back pain without sciatica  The right-sided low back pain started 2 months ago.  The pain is intermittent with rest. Since returning from [place] 2 weeks pain is constant. The pain is described as stabbing and can feel a knot. Interventions tried include massage, Advil, Tylenol  No shooting pain down legs.       Past Medical History[1]    Social History[2]    Current Medications and Prescriptions Ordered in Epic[3]    Allergies:  Codeine    Health Maintenance: recommend COVID booster      Objective:     Vital signs reviewed  Exam:  /74 (BP Location: Right arm, Patient Position: Sitting, BP Cuff Size: Adult)   Pulse 73   Temp 36.6 °C (97.9 °F) (Temporal)   Ht 1.524 m (5')   Wt 84.4 kg (186 lb)   SpO2 97%   BMI 36.33 kg/m²  Body mass index is 36.33 kg/m².    Gen: Alert and oriented, No apparent distress.  Neck: Neck is supple, full ROM.  Lungs: Normal effort, no audible wheezes  CV: Skin pink, warm and dry.  Feet:    pedal pulses 2+ bilaterally. Pain to bilateral fascia near heel  Back:   no pain to spinous processes. Reproducible pain to left lumbar paraspinal muscles      Assessment & Plan:     59 y.o. female with the following -     1. Plantar fasciitis, bilateral (Primary)  Chronic exacerbated  problem.  Continue with stretching, icing and NSAIDs.  Referral back to podiatry.  - Referral to Podiatry    2. Acute right-sided low back pain without sciatica  Acute uncomplicated problem.  Referral to physical therapy.  Continue with NSAIDs as needed.  - Referral to Physical Therapy    3. Preventative health care  Acute uncomplicated problem.  Annual labs ordered.  - CBC WITH DIFFERENTIAL; Future  - Comp Metabolic Panel; Future  - Lipid Profile; Future    4. Screening for endocrine, metabolic and immunity disorder  Acute uncomplicated problem.  Annual screening labs ordered.  - HEMOGLOBIN A1C; Future  - TSH WITH REFLEX TO FT4; Future  - VITAMIN D,25 HYDROXY (DEFICIENCY); Future    5. Need for vaccination  Acute uncomplicated problem.  She would like her pneumonia vaccine today. I have placed the below orders and discussed them with an approved delegating provider. The MA is performing the below orders under the direction of Dr. Martinez.  - Pneumococcal Conjugate Vaccine 20-Valent (6 wks+)      Return in about 2 months (around 9/7/2025) for annual.    Please note that this dictation was created using voice recognition software. I have made every reasonable attempt to correct obvious errors, but I expect that there are errors of grammar and possibly content that I did not discover before finalizing the note.             [1]   Past Medical History:  Diagnosis Date    Anxiety     Arthritis     osteo    Chickenpox     Complex tear of medial meniscus of right knee, initial encounter 09/28/2018    COVID-19 01/2021    Daytime sleepiness     Depression     Dizziness     Genital herpes     Has valtrex PRN    High cholesterol 2019    On a statin much lower now    Insomnia     Nasal drainage     Pain     Right knee    Painful joint     Positive PPD     Prediabetes 09/2021    Restless leg syndrome     Snoring 11/02/2023    Wears glasses    [2]   Social History  Tobacco Use    Smoking status: Never    Smokeless tobacco: Never    Vaping Use    Vaping status: Never Used   Substance Use Topics    Alcohol use: Not Currently     Comment: Nothing since 1/2021    Drug use: No   [3]   Current Outpatient Medications Ordered in Epic   Medication Sig Dispense Refill    rosuvastatin (CRESTOR) 10 MG Tab TAKE 1 TABLET BY MOUTH EVERY DAY IN THE EVENING 90 Tablet 0    traZODone (DESYREL) 50 MG Tab TAKE 1 TABLET BY MOUTH AT BEDTIME AS NEEDED FOR SLEEP. TAKE 30 MINUTES BEFORE BEDTIME. 90 Tablet 3    sertraline (ZOLOFT) 100 MG Tab TAKE 1 TABLET BY MOUTH EVERY DAY 90 Tablet 0    albuterol 108 (90 Base) MCG/ACT Aero Soln inhalation aerosol Inhale 1-2 Puffs every four hours as needed for Shortness of Breath. 1 Each 0    azelastine (ASTELIN) 137 MCG/SPRAY nasal spray Administer 1 Spray into affected nostril(S) 2 times a day. (Patient taking differently: Administer 1 Spray into affected nostril(S) 2 times a day. Prn) 30 mL 0    cholecalciferol (D3 5000) 5000 UNIT Cap Take 5,000 Units by mouth every day.      Multiple Vitamins-Minerals (MULTIVITAMIN GUMMIES WOMENS PO) Take  by mouth every day.       No current [facility]-ordered facility-administered medications on file.

## 2025-07-07 NOTE — TELEPHONE ENCOUNTER
Requested Prescriptions     Signed Prescriptions Disp Refills    rosuvastatin (CRESTOR) 10 MG Tab 90 Tablet 0     Sig: TAKE 1 TABLET BY MOUTH EVERY DAY IN THE EVENING     Authorizing Provider: MARIELLA MARK A.P.R.N.

## 2025-07-08 NOTE — ASSESSMENT & PLAN NOTE
The bilateral foot pain started 2 months ago.  History of plantar fasciitis. The pain is intermittent. Pain worsens with sitting and then standing. Walking no pain. The pain is described as sharp. Starts in heel and radiates to ball of foot. The pain on the right foot is worse than the left foot. Interventions tried include hot tub, massage, Advil/Tylenol, Voltaren, CBD topical. She does stretches in shower and icing. When she had pneumonia she was given Medrol dose catherine that helped symptoms. She has seen Podiatry years ago and received injection that helped.

## 2025-07-08 NOTE — ASSESSMENT & PLAN NOTE
The right-sided low back pain started 2 months ago.  The pain is intermittent with rest. Since returning from [place] 2 weeks pain is constant. The pain is described as stabbing and can feel a knot. Interventions tried include massage, Advil, Tylenol  No shooting pain down legs.

## 2025-07-11 NOTE — Clinical Note
REFERRAL APPROVAL NOTICE         Sent on July 11, 2025                   Aida Radha Shields  [address] [facility] [address]                   Dear Ms. Shields,    After a careful review of the medical information and benefit coverage, [facility] has processed your referral. See below for additional details.    If applicable, you must be actively enrolled with your insurance for coverage of the authorized service. If you have any questions regarding your coverage, please contact your insurance directly.    REFERRAL INFORMATION   Referral #:  [id number]  Referred-To Provider    Referred-By Provider:  Podiatry    HELEN Chung   [facility]      [address] [address] [phone] [address] [facility] [address]  [phone]    Referral Start Date:  07/07/2025  Referral End Date:   07/07/2026             SCHEDULING  If you do not already have an appointment, please call [phone] to make an appointment.     MORE INFORMATION  If you do not already have a [employer] account, sign up at: [email or web address]  You can access your medical information, make appointments, see lab results, billing information, and more.  If you have questions regarding this referral, please contact  the [facility] Referrals department at:             [phone]. Monday - Friday 8:00AM - 5:00PM.     Sincerely,    [facility]

## 2025-07-21 ENCOUNTER — HOSPITAL ENCOUNTER (OUTPATIENT)
Dept: LAB | Facility: MEDICAL CENTER | Age: 60
End: 2025-07-21
Attending: NURSE PRACTITIONER
Payer: COMMERCIAL

## 2025-07-21 DIAGNOSIS — Z13.0 SCREENING FOR ENDOCRINE, METABOLIC AND IMMUNITY DISORDER: ICD-10-CM

## 2025-07-21 DIAGNOSIS — Z00.00 PREVENTATIVE HEALTH CARE: ICD-10-CM

## 2025-07-21 DIAGNOSIS — Z13.29 SCREENING FOR ENDOCRINE, METABOLIC AND IMMUNITY DISORDER: ICD-10-CM

## 2025-07-21 DIAGNOSIS — Z13.228 SCREENING FOR ENDOCRINE, METABOLIC AND IMMUNITY DISORDER: ICD-10-CM

## 2025-07-21 LAB
25(OH)D3 SERPL-MCNC: 28 NG/ML (ref 30–100)
ALBUMIN SERPL BCP-MCNC: 4.3 G/DL (ref 3.2–4.9)
ALBUMIN/GLOB SERPL: 1.4 G/DL
ALP SERPL-CCNC: 106 U/L (ref 30–99)
ALT SERPL-CCNC: 29 U/L (ref 2–50)
ANION GAP SERPL CALC-SCNC: 11 MMOL/L (ref 7–16)
AST SERPL-CCNC: 41 U/L (ref 12–45)
BASOPHILS # BLD AUTO: 1.2 % (ref 0–1.8)
BASOPHILS # BLD: 0.09 K/UL (ref 0–0.12)
BILIRUB SERPL-MCNC: 0.4 MG/DL (ref 0.1–1.5)
BUN SERPL-MCNC: 13 MG/DL (ref 8–22)
CALCIUM ALBUM COR SERPL-MCNC: 9.2 MG/DL (ref 8.5–10.5)
CALCIUM SERPL-MCNC: 9.4 MG/DL (ref 8.5–10.5)
CHLORIDE SERPL-SCNC: 105 MMOL/L (ref 96–112)
CHOLEST SERPL-MCNC: 176 MG/DL (ref 100–199)
CO2 SERPL-SCNC: 23 MMOL/L (ref 20–33)
CREAT SERPL-MCNC: 0.85 MG/DL (ref 0.5–1.4)
EOSINOPHIL # BLD AUTO: 0.29 K/UL (ref 0–0.51)
EOSINOPHIL NFR BLD: 3.9 % (ref 0–6.9)
ERYTHROCYTE [DISTWIDTH] IN BLOOD BY AUTOMATED COUNT: 48.7 FL (ref 35.9–50)
EST. AVERAGE GLUCOSE BLD GHB EST-MCNC: 131 MG/DL
GFR SERPLBLD CREATININE-BSD FMLA CKD-EPI: 78 ML/MIN/1.73 M 2
GLOBULIN SER CALC-MCNC: 3 G/DL (ref 1.9–3.5)
GLUCOSE SERPL-MCNC: 97 MG/DL (ref 65–99)
HBA1C MFR BLD: 6.2 % (ref 4–5.6)
HCT VFR BLD AUTO: 44.4 % (ref 37–47)
HDLC SERPL-MCNC: 35 MG/DL
HGB BLD-MCNC: 14.2 G/DL (ref 12–16)
IMM GRANULOCYTES # BLD AUTO: 0.02 K/UL (ref 0–0.11)
IMM GRANULOCYTES NFR BLD AUTO: 0.3 % (ref 0–0.9)
LDLC SERPL CALC-MCNC: 114 MG/DL
LYMPHOCYTES # BLD AUTO: 1.98 K/UL (ref 1–4.8)
LYMPHOCYTES NFR BLD: 26.9 % (ref 22–41)
MCH RBC QN AUTO: 30.1 PG (ref 27–33)
MCHC RBC AUTO-ENTMCNC: 32 G/DL (ref 32.2–35.5)
MCV RBC AUTO: 94.1 FL (ref 81.4–97.8)
MONOCYTES # BLD AUTO: 0.42 K/UL (ref 0–0.85)
MONOCYTES NFR BLD AUTO: 5.7 % (ref 0–13.4)
NEUTROPHILS # BLD AUTO: 4.57 K/UL (ref 1.82–7.42)
NEUTROPHILS NFR BLD: 62 % (ref 44–72)
NRBC # BLD AUTO: 0 K/UL
NRBC BLD-RTO: 0 /100 WBC (ref 0–0.2)
PLATELET # BLD AUTO: 311 K/UL (ref 164–446)
PMV BLD AUTO: 9.4 FL (ref 9–12.9)
POTASSIUM SERPL-SCNC: 4.3 MMOL/L (ref 3.6–5.5)
PROT SERPL-MCNC: 7.3 G/DL (ref 6–8.2)
RBC # BLD AUTO: 4.72 M/UL (ref 4.2–5.4)
SODIUM SERPL-SCNC: 139 MMOL/L (ref 135–145)
TRIGL SERPL-MCNC: 135 MG/DL (ref 0–149)
TSH SERPL DL<=0.005 MIU/L-ACNC: 1.83 UIU/ML (ref 0.38–5.33)
WBC # BLD AUTO: 7.4 K/UL (ref 4.8–10.8)

## 2025-07-21 PROCEDURE — 80061 LIPID PANEL: CPT

## 2025-07-21 PROCEDURE — 84443 ASSAY THYROID STIM HORMONE: CPT

## 2025-07-21 PROCEDURE — 85025 COMPLETE CBC W/AUTO DIFF WBC: CPT

## 2025-07-21 PROCEDURE — 36415 COLL VENOUS BLD VENIPUNCTURE: CPT

## 2025-07-21 PROCEDURE — 83036 HEMOGLOBIN GLYCOSYLATED A1C: CPT

## 2025-07-21 PROCEDURE — 80053 COMPREHEN METABOLIC PANEL: CPT

## 2025-07-21 PROCEDURE — 82306 VITAMIN D 25 HYDROXY: CPT

## 2025-07-22 ENCOUNTER — RESULTS FOLLOW-UP (OUTPATIENT)
Dept: MEDICAL GROUP | Facility: PHYSICIAN GROUP | Age: 60
End: 2025-07-22
Payer: COMMERCIAL

## 2025-07-22 DIAGNOSIS — R74.8 ELEVATED ALKALINE PHOSPHATASE LEVEL: Primary | ICD-10-CM

## 2025-07-23 NOTE — TELEPHONE ENCOUNTER
Alkaline phosphatase with chronic elevation.  Last ultrasound 2007.  Check updated right upper quadrant ultrasound.

## (undated) DEVICE — SPONGE GAUZESTER 4 X 4 4PLY - (128PK/CA)

## (undated) DEVICE — SUTURE 3-0 PROLENE PS-1 (12PK/BX)

## (undated) DEVICE — ELECTRODE DUAL RETURN W/ CORD - (50/PK)

## (undated) DEVICE — GLOVE BIOGEL INDICATOR SZ 8 SURGICAL PF LTX - (50/BX 4BX/CA)

## (undated) DEVICE — TUBING PATIENT W/CONNECTOR REDEUCE (1EA)

## (undated) DEVICE — DRAPE LARGE 3 QUARTER - (20/CA)

## (undated) DEVICE — GLOVE BIOGEL PI ULTRATOUCH SZ 6.5 SURGICAL PF LF - (50/BX)

## (undated) DEVICE — WATER IRRIGATION STERILE 1000ML (12EA/CA)

## (undated) DEVICE — BAG SPONGE COUNT 10.25 X 32 - BLUE (250/CA)

## (undated) DEVICE — DRAPE LOWER EXTREMETY - (6/CA)

## (undated) DEVICE — GLOVE, LITE (PAIR)

## (undated) DEVICE — GLOVE SZ 7.5 LF PROTEXIS (50PR/BX)

## (undated) DEVICE — GLOVE BIOGEL SZ 7.5 SURGICAL PF LTX - (50PR/BX 4BX/CA)

## (undated) DEVICE — BLADE SHAVER AGGRESSIVE PLUS 4.0MM ANGLED (5EA/BX)

## (undated) DEVICE — SENSOR SPO2 NEO LNCS ADHESIVE (20/BX) SEE USER NOTES

## (undated) DEVICE — SODIUM CHL IRRIGATION 0.9% 1000ML (12EA/CA)

## (undated) DEVICE — SUTURE GENERAL

## (undated) DEVICE — NEPTUNE 4 PORT MANIFOLD - (20/PK)

## (undated) DEVICE — GLOVE BIOGEL SZ 8 SURGICAL PF LTX - (50PR/BX 4BX/CA)

## (undated) DEVICE — SODIUM CHL. IRRIGATION 0.9% 3000ML (4EA/CA 65CA/PF)

## (undated) DEVICE — TUBING DAY USE W/CARTRIDGE (10EA/BX)

## (undated) DEVICE — MASK ANESTHESIA ADULT  - (100/CA)

## (undated) DEVICE — GOWN WARMING STANDARD FLEX - (30/CA)

## (undated) DEVICE — PACK LOWER EXTREMITY - (2/CA)

## (undated) DEVICE — PACK ARTHROSCOPY - (2EA//CA)

## (undated) DEVICE — SYRINGE DISP. 60 CC LL - (30/BX, 12BX/CA)**WHEN THESE ARE GONE ORDER #500206**

## (undated) DEVICE — SUCTION INSTRUMENT YANKAUER BULBOUS TIP W/O VENT (50EA/CA)

## (undated) DEVICE — NEEDLE SAFETY 18 GA X 1 1/2 IN (100EA/BX)

## (undated) DEVICE — TUBE CONNECTING SUCTION - CLEAR PLASTIC STERILE 72 IN (50EA/CA)

## (undated) DEVICE — KIT ROOM DECONTAMINATION

## (undated) DEVICE — DRAPE STRLE REG TOWEL 18X24 - (10/BX 4BX/CA)"

## (undated) DEVICE — SENSOR OXIMETER ADULT SPO2 RD SET (20EA/BX)

## (undated) DEVICE — GLOVE BIOGEL PI INDICATOR SZ 8.0 SURGICAL PF LF -(50/BX 4BX/CA)

## (undated) DEVICE — PAD PREP 24 X 48 CUFFED - (100/CA)

## (undated) DEVICE — CHLORAPREP 26 ML APPLICATOR - ORANGE TINT(25/CA)

## (undated) DEVICE — BOVIE NEEDLE TIP INSULATD NON-SAFETY 2CM (50/PK)

## (undated) DEVICE — GOWN SURGICAL X-LARGE ULTRA - FILM-REINFORCED (20/CA)

## (undated) DEVICE — KIT ANESTHESIA W/CIRCUIT & 3/LT BAG W/FILTER (20EA/CA)

## (undated) DEVICE — STOCKINET TUBULAR 4IN STERILE - 4 X 36YDS (25/CA)

## (undated) DEVICE — PROTECTOR ULNA NERVE - (36PR/CA)

## (undated) DEVICE — TUBING CASSETTE CROSSFLOW INTEGRATED (10EA/CA)

## (undated) DEVICE — HEAD HOLDER JUNIOR/ADULT

## (undated) DEVICE — LACTATED RINGERS INJ 1000 ML - (14EA/CA 60CA/PF)

## (undated) DEVICE — DRAPE SURGICAL U 77X120 - (10/CA)

## (undated) DEVICE — HUMID-VENT HEAT AND MOISTURE EXCHANGE- (50/BX)

## (undated) DEVICE — CANISTER SUCTION RIGID RED 1500CC (40EA/CA)

## (undated) DEVICE — TUBING PUMP WITH CONNECTOR REDEUCE (1EA)

## (undated) DEVICE — TOWEL STOP TIMEOUT SAFETY FLAG (40EA/CA)

## (undated) DEVICE — ELECTRODE 850 FOAM ADHESIVE - HYDROGEL RADIOTRNSPRNT (50/PK)

## (undated) DEVICE — ABLATOR WAND SERFAS 90-S CRUISE

## (undated) DEVICE — MASK AIRWAY SZ 2.5 UNIQUE SILICON (10EA/BX)

## (undated) DEVICE — GLOVE BIOGEL PI INDICATOR SZ 7.0 SURGICAL PF LF - (50/BX 4BX/CA)